# Patient Record
Sex: MALE | Race: BLACK OR AFRICAN AMERICAN | NOT HISPANIC OR LATINO | Employment: FULL TIME | URBAN - METROPOLITAN AREA
[De-identification: names, ages, dates, MRNs, and addresses within clinical notes are randomized per-mention and may not be internally consistent; named-entity substitution may affect disease eponyms.]

---

## 2017-05-16 ENCOUNTER — HOSPITAL ENCOUNTER (OUTPATIENT)
Dept: BONE DENSITY | Facility: CLINIC | Age: 22
Discharge: HOME | End: 2017-05-16
Attending: INTERNAL MEDICINE | Admitting: INTERNAL MEDICINE
Payer: MEDICAID

## 2017-05-16 DIAGNOSIS — K50.00 CROHN'S DISEASE OF SMALL INTESTINE WITHOUT COMPLICATION (H): ICD-10-CM

## 2017-05-16 DIAGNOSIS — E55.9 VITAMIN D DEFICIENCY: ICD-10-CM

## 2017-05-16 PROCEDURE — 77080 DXA BONE DENSITY AXIAL: CPT

## 2017-07-23 ENCOUNTER — HOSPITAL ENCOUNTER (EMERGENCY)
Facility: CLINIC | Age: 22
Discharge: HOME | End: 2017-07-23
Attending: NURSE PRACTITIONER | Admitting: NURSE PRACTITIONER
Payer: COMMERCIAL

## 2017-07-23 ENCOUNTER — APPOINTMENT (OUTPATIENT)
Dept: CT IMAGING | Facility: CLINIC | Age: 22
End: 2017-07-23
Attending: NURSE PRACTITIONER
Payer: COMMERCIAL

## 2017-07-23 ENCOUNTER — APPOINTMENT (OUTPATIENT)
Dept: GENERAL RADIOLOGY | Facility: CLINIC | Age: 22
End: 2017-07-23
Attending: NURSE PRACTITIONER
Payer: COMMERCIAL

## 2017-07-23 VITALS
OXYGEN SATURATION: 99 % | SYSTOLIC BLOOD PRESSURE: 113 MMHG | HEART RATE: 62 BPM | DIASTOLIC BLOOD PRESSURE: 68 MMHG | TEMPERATURE: 98.1 F | WEIGHT: 145 LBS | HEIGHT: 68 IN | BODY MASS INDEX: 21.98 KG/M2

## 2017-07-23 DIAGNOSIS — R51.9 HEADACHE, UNSPECIFIED HEADACHE TYPE: ICD-10-CM

## 2017-07-23 DIAGNOSIS — R06.02 SOB (SHORTNESS OF BREATH): ICD-10-CM

## 2017-07-23 LAB
ANION GAP SERPL CALCULATED.3IONS-SCNC: 9 MMOL/L (ref 3–14)
BASOPHILS # BLD AUTO: 0 10E9/L (ref 0–0.2)
BASOPHILS NFR BLD AUTO: 0.2 %
BUN SERPL-MCNC: 16 MG/DL (ref 7–30)
CALCIUM SERPL-MCNC: 8.6 MG/DL (ref 8.5–10.1)
CHLORIDE SERPL-SCNC: 106 MMOL/L (ref 94–109)
CO2 SERPL-SCNC: 25 MMOL/L (ref 20–32)
CREAT SERPL-MCNC: 1.24 MG/DL (ref 0.66–1.25)
D DIMER PPP FEU-MCNC: NORMAL UG/ML FEU (ref 0–0.5)
DIFFERENTIAL METHOD BLD: ABNORMAL
EOSINOPHIL # BLD AUTO: 0.2 10E9/L (ref 0–0.7)
EOSINOPHIL NFR BLD AUTO: 2.1 %
ERYTHROCYTE [DISTWIDTH] IN BLOOD BY AUTOMATED COUNT: 15 % (ref 10–15)
GFR SERPL CREATININE-BSD FRML MDRD: 73 ML/MIN/1.7M2
GLUCOSE SERPL-MCNC: 89 MG/DL (ref 70–99)
HCT VFR BLD AUTO: 37.8 % (ref 40–53)
HGB BLD-MCNC: 12.1 G/DL (ref 13.3–17.7)
IMM GRANULOCYTES # BLD: 0 10E9/L (ref 0–0.4)
IMM GRANULOCYTES NFR BLD: 0 %
LYMPHOCYTES # BLD AUTO: 3.3 10E9/L (ref 0.8–5.3)
LYMPHOCYTES NFR BLD AUTO: 36.6 %
MCH RBC QN AUTO: 24.6 PG (ref 26.5–33)
MCHC RBC AUTO-ENTMCNC: 32 G/DL (ref 31.5–36.5)
MCV RBC AUTO: 77 FL (ref 78–100)
MONOCYTES # BLD AUTO: 0.6 10E9/L (ref 0–1.3)
MONOCYTES NFR BLD AUTO: 6.5 %
NEUTROPHILS # BLD AUTO: 5 10E9/L (ref 1.6–8.3)
NEUTROPHILS NFR BLD AUTO: 54.6 %
NRBC # BLD AUTO: 0 10*3/UL
NRBC BLD AUTO-RTO: 0 /100
PLATELET # BLD AUTO: 295 10E9/L (ref 150–450)
POTASSIUM SERPL-SCNC: 3.7 MMOL/L (ref 3.4–5.3)
RBC # BLD AUTO: 4.91 10E12/L (ref 4.4–5.9)
SODIUM SERPL-SCNC: 140 MMOL/L (ref 133–144)
WBC # BLD AUTO: 9.1 10E9/L (ref 4–11)

## 2017-07-23 PROCEDURE — 99285 EMERGENCY DEPT VISIT HI MDM: CPT | Mod: 25

## 2017-07-23 PROCEDURE — 85379 FIBRIN DEGRADATION QUANT: CPT | Performed by: NURSE PRACTITIONER

## 2017-07-23 PROCEDURE — 93005 ELECTROCARDIOGRAM TRACING: CPT

## 2017-07-23 PROCEDURE — 71020 XR CHEST 2 VW: CPT

## 2017-07-23 PROCEDURE — 70450 CT HEAD/BRAIN W/O DYE: CPT

## 2017-07-23 PROCEDURE — 85025 COMPLETE CBC W/AUTO DIFF WBC: CPT | Performed by: NURSE PRACTITIONER

## 2017-07-23 PROCEDURE — 80048 BASIC METABOLIC PNL TOTAL CA: CPT | Performed by: NURSE PRACTITIONER

## 2017-07-23 ASSESSMENT — ENCOUNTER SYMPTOMS
WHEEZING: 0
APPETITE CHANGE: 1
FEVER: 0
WEAKNESS: 0
ABDOMINAL PAIN: 0
LIGHT-HEADEDNESS: 1
SHORTNESS OF BREATH: 1
HEADACHES: 1
PALPITATIONS: 0
NUMBNESS: 0

## 2017-07-23 NOTE — ED AVS SNAPSHOT
Emergency Department    [address] [facility] [address]    Phone:  [phone]    Fax:  [phone]                                       Giovanni Carranza   MRN: [id number]    Department:   Emergency Department   Date of Visit:  7/23/2017           Patient Information     Date Of Birth          1995        Your diagnoses for this visit were:     Headache, unspecified headache type resolved    SOB (shortness of breath)        You were seen by Neyda Jefferson CNP.      Follow-up Information     Follow up with Andrea Alarcon MD In 2 days.    Specialty:  Gastroenterology    Contact information:    [facility]  [address] [facility] [address] [facility] [id number] [phone]          Follow up with  Emergency Department.    Specialty:  EMERGENCY MEDICINE    Why:  As needed, If symptoms worsen    Contact information:    [address] [facility] [id number] [phone]        Discharge Instructions       Discharge Instructions  Headache    You were seen today for a headache. Headaches may be caused by many different things such as muscle tension, sinus inflammation, anxiety and stress, having too little sleep, too much alcohol, some medical conditions or injury. You may have a migraine, which is caused by changes in the blood vessels in your head.  At this time your doctor does not find that your headache is a sign of anything dangerous or life-threatening.  However, sometimes the signs of serious illness do not show up right away.  If you have new or worse symptoms, you may need to be seen again in the emergency department or by your primary doctor.      Return to the Emergency Department if:    You get a fever of 101 F or higher.    Your headache gets much worse.    You get a stiff neck with your headache.    You get a new headache that is different or worse than headaches you have had before.    You are vomiting and can t keep food or water down.    You have  blurry or double vision or other problems with your eyes.    You have a new weakness on one side of your body.    You have difficulty with balance which is new.    You or your family thinks you are confused.    You have a seizure or convulsion.    What can I do to help myself?    Pain medications - You may take a pain medication such as Tylenol  (acetaminophen), Advil , Nuprin  (ibuprofen) or Aleve  (naproxen).  If you have been given a narcotic such as Vicodin  (hydrocodone with acetaminophen), Percocet  (oxycodone with acetaminophen), codeine, or a muscle relaxant such as Flexeril  (cyclobenzaprine) or Soma  (carisoprodol), do not drive for four hours after you have taken it. If the narcotic contains Tylenol  (acetaminophen), do not take Tylenol  with it. All narcotics will cause constipation, so eat a high fiber diet.        Take a pain reliever as soon as you notice symptoms.  Starting medications as soon as you start to have symptoms may lessen the amount of pain you have.    Relaxing in a quiet, dark room may help.    Get enough sleep and eat meals regularly.    Schedule an appointment with your primary physician as instructed, or at least within 1 week.    You may need to watch for certain foods or other things which may trigger your headaches.  Keeping a journal of your headaches and possible triggers may help you and your primary doctor to identify things which you should avoid which may be causing your headaches.  If you were given a prescription for medicine here today, be sure to read all of the information (including the package insert) that comes with your prescription.  This will include important information about the medicine, its side effects, and any warnings that you need to know about.  The pharmacist who fills the prescription can provide more information and answer questions you may have about the medicine.  If you have questions or concerns that the pharmacist cannot address, please call or  return to the Emergency Department.     Remember that you can always come back to the Emergency Department if you are not able to see your regular doctor in the amount of time listed above, if you get any new symptoms, or if there is anything that worries you.      Shortness of Breath (Dyspnea)  Shortness of breath is the feeling that you can't catch your breath or get enough air. It is also known as dyspnea.  Dyspnea can be caused by many different conditions. They include:    Acute asthma attack.    Worsening of chronic lung diseases such as chronic bronchitis and emphysema.    Heart failure. This is when weak heart muscle allows extra fluid to collect in the lungs.    Panic attacks or anxiety. Fear can cause rapid breathing (hyperventilation).    Pneumonia, or an infection in the lung tissue.    Exposure to toxic substances, fumes, smoke, or certain medicines.    Blood clot in the lung (pulmonary embolism). This is often from a piece of blood clot in a deep vein of the leg (deep vein thrombosis) that breaks off and travels to the lungs.    Heart attack or heart-related chest pain (angina).    Anemia.    Collapsed lung (pneumothorax).    Dehydration.    Pregnancy.  Based on your visit today, the exact cause of your shortness of breath is not certain. Your tests don t show any of the serious causes of dyspnea. You may need other tests to find out if you have a serious problem. It s important to watch for any new symptoms or symptoms that get worse. Follow up with your healthcare provider as directed.  Home care  Follow these tips to take care of yourself at home:    When your symptoms are better, go back to your usual activities.    If you smoke, you should stop. Join a quit-smoking program or ask your healthcare provider for help.    Eat a healthy diet and get plenty of sleep.    Get regular exercise. Talk with your healthcare provider before starting to exercise, especially if you have other medical  problems.    Cut down on the amount of caffeine and stimulants you consume.  Follow-up care  Follow up with your healthcare provider, or as advised.  If tests were done, you will be told if your treatment needs to be changed. You can call as directed for the results.  (Note: If an X-ray was taken, a specialist will review it. You will be notified of any new findings that may affect your care.)  Call 911 or get immediate medical care  Shortness of breath may be a sign of a serious medical problem. For example, it may be a problem with your heart or lungs. Call 911 if you have worsening shortness of breath or trouble breathing, especially with any of the symptoms below:    You are confused or it s difficult to wake you.    You faint or lose consciousness.    You have a fast heartbeat, or your heartbeat is irregular.    You are coughing up blood.    You have pain in your chest, arm, shoulder, neck, or upper back.    You break out in a sweat.  When to seek medical advice  Call your healthcare provider right away if any of these occur:    Slight shortness of breath or wheezing    Redness, pain or swelling in your leg, arm, or other body area    Swelling in both legs or ankles    Fast weight gain    Dizziness or weakness    Fever of 100.4 F (38 C) or higher, or as directed by your healthcare provider  Date Last Reviewed: 9/13/2015 2000-2017 [employer]. [address] [place], [place] [address]. All rights reserved. This information is not intended as a substitute for professional medical care. Always follow your healthcare professional's instructions.            24 Hour Appointment Hotline       To make an appointment at any [facility], call [phone] ([phone]). If you don't have a family doctor or clinic, we will help you find one. [place] clinics are conveniently located to serve the needs of you and your family.             Review of your medicines      Our records show that you are  taking the medicines listed below. If these are incorrect, please call your family doctor or clinic.        Dose / Directions Last dose taken    REMICADE IV        Every 2 months. (Patient gets infusions, current dose unknown)   Refills:  0                Procedures and tests performed during your visit     Basic metabolic panel    CBC with platelets differential    Chest XR,  PA & LAT    D dimer quantitative    EKG 12 lead    Head CT w/o contrast      Orders Needing Specimen Collection     None      Pending Results     Date and Time Order Name Status Description    7/23/2017 2111 Chest XR,  PA & LAT Preliminary             Pending Culture Results     No orders found from 7/21/2017 to 7/24/2017.            Pending Results Instructions     If you had any lab results that were not finalized at the time of your Discharge, you can call the ED Lab Result RN at [phone]. You will be contacted by this team for any positive Lab results or changes in treatment. The nurses are available 7 days a week from 10A to 6:30P.  You can leave a message 24 hours per day and they will return your call.        Test Results From Your Hospital Stay        7/23/2017  9:50 PM      Component Results     Component Value Ref Range & Units Status    Sodium 140 133 - 144 mmol/L Final    Potassium 3.7 3.4 - 5.3 mmol/L Final    Chloride 106 94 - 109 mmol/L Final    Carbon Dioxide 25 20 - 32 mmol/L Final    Anion Gap 9 3 - 14 mmol/L Final    Glucose 89 70 - 99 mg/dL Final    Urea Nitrogen 16 7 - 30 mg/dL Final    Creatinine 1.24 0.66 - 1.25 mg/dL Final    GFR Estimate 73 >60 mL/min/1.7m2 Final    Non  GFR Calc    GFR Estimate If Black 89 >60 mL/min/1.7m2 Final    African American GFR Calc    Calcium 8.6 8.5 - 10.1 mg/dL Final         7/23/2017  9:37 PM      Component Results     Component Value Ref Range & Units Status    WBC 9.1 4.0 - 11.0 10e9/L Final    RBC Count 4.91 4.4 - 5.9 10e12/L Final    Hemoglobin 12.1 (L) 13.3 - 17.7  g/dL Final    Hematocrit 37.8 (L) 40.0 - 53.0 % Final    MCV 77 (L) 78 - 100 fl Final    MCH 24.6 (L) 26.5 - 33.0 pg Final    MCHC 32.0 31.5 - 36.5 g/dL Final    RDW 15.0 10.0 - 15.0 % Final    Platelet Count 295 150 - 450 10e9/L Final    Diff Method Automated Method  Final    % Neutrophils 54.6 % Final    % Lymphocytes 36.6 % Final    % Monocytes 6.5 % Final    % Eosinophils 2.1 % Final    % Basophils 0.2 % Final    % Immature Granulocytes 0.0 % Final    Nucleated RBCs 0 0 /100 Final    Absolute Neutrophil 5.0 1.6 - 8.3 10e9/L Final    Absolute Lymphocytes 3.3 0.8 - 5.3 10e9/L Final    Absolute Monocytes 0.6 0.0 - 1.3 10e9/L Final    Absolute Eosinophils 0.2 0.0 - 0.7 10e9/L Final    Absolute Basophils 0.0 0.0 - 0.2 10e9/L Final    Abs Immature Granulocytes 0.0 0 - 0.4 10e9/L Final    Absolute Nucleated RBC 0.0  Final         7/23/2017  9:49 PM      Component Results     Component Value Ref Range & Units Status    D Dimer  0.0 - 0.50 ug/ml FEU Final    <0.3  This D-dimer assay is intended for use in conjunction with a clinical pretest   probability assessment model to exclude pulmonary embolism (PE) and deep venous   thrombosis (DVT) in outpatients suspected of PE or DVT. The cut-off value is   0.5 ug/mL FEU.           7/23/2017 10:03 PM      Narrative     CHEST TWO VIEWS 7/23/2017 9:50 PM     HISTORY: Shortness of breath.    COMPARISON: 11/2/2016.    FINDINGS: There are no acute infiltrates. The cardiac silhouette is  not enlarged. Pulmonary vasculature is unremarkable.        Impression     IMPRESSION: No acute disease.          7/23/2017 10:03 PM      Narrative     CT SCAN OF THE HEAD WITHOUT CONTRAST   7/23/2017 9:49 PM     HISTORY: Headache x 1 month    TECHNIQUE:  Axial images of the head and coronal reformations without  IV contrast material.  Radiation dose for this scan was reduced using  automated exposure control, adjustment of the mA and/or kV according  to patient size, or iterative reconstruction  technique.    COMPARISON: None.    FINDINGS:  The ventricles are normal in size, shape and configuration.   The brain parenchyma and subarachnoid spaces are normal. There is no  evidence of intracranial hemorrhage, mass, acute infarct or anomaly.     The visualized portions of the sinuses and mastoids appear normal.  There is no evidence of trauma.        Impression     IMPRESSION: Normal CT scan of the head.      HEMANTH ALEXANDER MD                Clinical Quality Measure: Blood Pressure Screening     Your blood pressure was checked while you were in the emergency department today. The last reading we obtained was  BP: 113/68 . Please read the guidelines below about what these numbers mean and what you should do about them.  If your systolic blood pressure (the top number) is less than 120 and your diastolic blood pressure (the bottom number) is less than 80, then your blood pressure is normal. There is nothing more that you need to do about it.  If your systolic blood pressure (the top number) is 120-139 or your diastolic blood pressure (the bottom number) is 80-89, your blood pressure may be higher than it should be. You should have your blood pressure rechecked within a year by a primary care provider.  If your systolic blood pressure (the top number) is 140 or greater or your diastolic blood pressure (the bottom number) is 90 or greater, you may have high blood pressure. High blood pressure is treatable, but if left untreated over time it can put you at risk for heart attack, stroke, or kidney failure. You should have your blood pressure rechecked by a primary care provider within the next 4 weeks.  If your provider in the emergency department today gave you specific instructions to follow-up with your doctor or provider even sooner than that, you should follow that instruction and not wait for up to 4 weeks for your follow-up visit.        Thank you for choosing [facility]       Thank you for choosing [facility] for  "your care. Our goal is always to provide you with excellent care. Hearing back from our patients is one way we can continue to improve our services. Please take a few minutes to complete the written survey that you may receive in the mail after you visit with us. Thank you!        [employer] Information     [employer] lets you send messages to your doctor, view your test results, renew your prescriptions, schedule appointments and more. To sign up, go to [email or web address] . Click on \"Log in\" on the left side of the screen, which will take you to the Welcome page. Then click on \"Sign up Now\" on the right side of the page.     You will be asked to enter the access code listed below, as well as some personal information. Please follow the directions to create your username and password.     Your access code is: [email or web address]  Expires: 10/21/2017 10:38 PM     Your access code will  in 90 days. If you need help or a new code, please call your [place] clinic or [phone].        Care EveryWhere ID     This is your Care EveryWhere ID. This could be used by other organizations to access your [place] medical records  [id number]        Equal Access to Services     MIMI DANIEL AH: Anjali Massey, michelle morris, griffin romero, eloy albright. So [facility] [phone].    ATENCIÓN: Si habla español, tiene a rodriguez disposición servicios gratuitos de asistencia lingüística. Mikhail al [phone].    We comply with applicable federal civil rights laws and [place] laws. We do not discriminate on the basis of race, color, national origin, age, disability sex, sexual orientation or gender identity.            After Visit Summary       This is your record. Keep this with you and show to your community pharmacist(s) and doctor(s) at your next visit.                  "

## 2017-07-23 NOTE — ED AVS SNAPSHOT
Emergency Department    [address] [place] [address]    Phone:  [phone]    Fax:  [phone]                                       Giovanni Carranza   MRN: [id number]    Department:   Emergency Department   Date of Visit:  7/23/2017           After Visit Summary Signature Page     I have received my discharge instructions, and my questions have been answered. I have discussed any challenges I see with this plan with the nurse or doctor.    ..........................................................................................................................................  Patient/Patient Representative Signature      ..........................................................................................................................................  Patient Representative Print Name and Relationship to Patient    ..................................................               ................................................  Date                                            Time    ..........................................................................................................................................  Reviewed by Signature/Title    ...................................................              ..............................................  Date                                                            Time

## 2017-07-24 LAB — INTERPRETATION ECG - MUSE: NORMAL

## 2017-07-24 NOTE — DISCHARGE INSTRUCTIONS
Discharge Instructions  Headache    You were seen today for a headache. Headaches may be caused by many different things such as muscle tension, sinus inflammation, anxiety and stress, having too little sleep, too much alcohol, some medical conditions or injury. You may have a migraine, which is caused by changes in the blood vessels in your head.  At this time your doctor does not find that your headache is a sign of anything dangerous or life-threatening.  However, sometimes the signs of serious illness do not show up right away.  If you have new or worse symptoms, you may need to be seen again in the emergency department or by your primary doctor.      Return to the Emergency Department if:    You get a fever of 101 F or higher.    Your headache gets much worse.    You get a stiff neck with your headache.    You get a new headache that is different or worse than headaches you have had before.    You are vomiting and can t keep food or water down.    You have blurry or double vision or other problems with your eyes.    You have a new weakness on one side of your body.    You have difficulty with balance which is new.    You or your family thinks you are confused.    You have a seizure or convulsion.    What can I do to help myself?    Pain medications - You may take a pain medication such as Tylenol  (acetaminophen), Advil , Nuprin  (ibuprofen) or Aleve  (naproxen).  If you have been given a narcotic such as Vicodin  (hydrocodone with acetaminophen), Percocet  (oxycodone with acetaminophen), codeine, or a muscle relaxant such as Flexeril  (cyclobenzaprine) or Soma  (carisoprodol), do not drive for four hours after you have taken it. If the narcotic contains Tylenol  (acetaminophen), do not take Tylenol  with it. All narcotics will cause constipation, so eat a high fiber diet.        Take a pain reliever as soon as you notice symptoms.  Starting medications as soon as you start to have symptoms may lessen the  amount of pain you have.    Relaxing in a quiet, dark room may help.    Get enough sleep and eat meals regularly.    Schedule an appointment with your primary physician as instructed, or at least within 1 week.    You may need to watch for certain foods or other things which may trigger your headaches.  Keeping a journal of your headaches and possible triggers may help you and your primary doctor to identify things which you should avoid which may be causing your headaches.  If you were given a prescription for medicine here today, be sure to read all of the information (including the package insert) that comes with your prescription.  This will include important information about the medicine, its side effects, and any warnings that you need to know about.  The pharmacist who fills the prescription can provide more information and answer questions you may have about the medicine.  If you have questions or concerns that the pharmacist cannot address, please call or return to the Emergency Department.     Remember that you can always come back to the Emergency Department if you are not able to see your regular doctor in the amount of time listed above, if you get any new symptoms, or if there is anything that worries you.      Shortness of Breath (Dyspnea)  Shortness of breath is the feeling that you can't catch your breath or get enough air. It is also known as dyspnea.  Dyspnea can be caused by many different conditions. They include:    Acute asthma attack.    Worsening of chronic lung diseases such as chronic bronchitis and emphysema.    Heart failure. This is when weak heart muscle allows extra fluid to collect in the lungs.    Panic attacks or anxiety. Fear can cause rapid breathing (hyperventilation).    Pneumonia, or an infection in the lung tissue.    Exposure to toxic substances, fumes, smoke, or certain medicines.    Blood clot in the lung (pulmonary embolism). This is often from a piece of blood clot in a  deep vein of the leg (deep vein thrombosis) that breaks off and travels to the lungs.    Heart attack or heart-related chest pain (angina).    Anemia.    Collapsed lung (pneumothorax).    Dehydration.    Pregnancy.  Based on your visit today, the exact cause of your shortness of breath is not certain. Your tests don t show any of the serious causes of dyspnea. You may need other tests to find out if you have a serious problem. It s important to watch for any new symptoms or symptoms that get worse. Follow up with your healthcare provider as directed.  Home care  Follow these tips to take care of yourself at home:    When your symptoms are better, go back to your usual activities.    If you smoke, you should stop. Join a quit-smoking program or ask your healthcare provider for help.    Eat a healthy diet and get plenty of sleep.    Get regular exercise. Talk with your healthcare provider before starting to exercise, especially if you have other medical problems.    Cut down on the amount of caffeine and stimulants you consume.  Follow-up care  Follow up with your healthcare provider, or as advised.  If tests were done, you will be told if your treatment needs to be changed. You can call as directed for the results.  (Note: If an X-ray was taken, a specialist will review it. You will be notified of any new findings that may affect your care.)  Call 911 or get immediate medical care  Shortness of breath may be a sign of a serious medical problem. For example, it may be a problem with your heart or lungs. Call 911 if you have worsening shortness of breath or trouble breathing, especially with any of the symptoms below:    You are confused or it s difficult to wake you.    You faint or lose consciousness.    You have a fast heartbeat, or your heartbeat is irregular.    You are coughing up blood.    You have pain in your chest, arm, shoulder, neck, or upper back.    You break out in a sweat.  When to seek medical  advice  Call your healthcare provider right away if any of these occur:    Slight shortness of breath or wheezing    Redness, pain or swelling in your leg, arm, or other body area    Swelling in both legs or ankles    Fast weight gain    Dizziness or weakness    Fever of 100.4 F (38 C) or higher, or as directed by your healthcare provider  Date Last Reviewed: 9/13/2015 2000-2017 [employer]. [address] [place] [address]. All rights reserved. This information is not intended as a substitute for professional medical care. Always follow your healthcare professional's instructions.

## 2017-07-24 NOTE — ED PROVIDER NOTES
History     Chief Complaint:  Headache    HPI   Giovanni Carranza is a 22 year old male, with Crohns disease on Remicade, who presents with ongoing headache. The patient states that he is an active individual as he dances at school and is physically active outside of class. For the past month, the patient noticed a decrease in stamina as well as consistent headaches, localized to the temporal region, that last approximately 3-4 minutes in episodic length and occur multiple times through the day. He notes that no specific stimuli trigger his headaches. Today, the patient states he had a family gathering and was especially active today and was also walking at the mall when he had increased shortness of breath, prompting his ED visit. He denies previous history of headaches, recent injuries or trauma to his head or neck, visual changes, history of asthma, wheezing, fever, chest pain, palpitations, abdominal pain, confusion, or weakness associated with the pain. While here in the ED, the patient states he does not currently have a headache.     Allergies:  Percocet [Oxycodone-Acetaminophen]      Medications:    Remicade    Past Medical History:    Crohn's disease  Psoas abscess  Vision problem    Past Surgical History:    Colonoscopy  Laparoscopic resection ileocecal  Small intestine surgery    Family History:    No past pertinent family history.     Social History:  Negative for alcohol use.  Negative for tobacco use.   Marital Status:  Single [1]     Review of Systems   Constitutional: Positive for appetite change. Negative for fever.   Respiratory: Positive for shortness of breath. Negative for wheezing.    Cardiovascular: Negative for chest pain and palpitations.   Gastrointestinal: Negative for abdominal pain.   Neurological: Positive for light-headedness and headaches. Negative for weakness and numbness.   All other systems reviewed and are negative.    Physical Exam   Vitals:   BP Temp Temp src Heart Rate SpO2  "Height Weight   07/23/17 2050 113/68 98.1  F (36.7  C) Oral 62 99 % 1.727 m (5' 8\") 65.8 kg (145 lb)        Physical Exam  Nursing notes reviewed. Vitals reviewed.  General: Alert. Well kept.  Eyes:  Conjunctiva non-injected, non-icteric.  Ears:TM s normal.  Neck/Throat: Moist mucous membranes, oropharynx clear without erythema or exudate. No cervical lymphadenopathy.  Normal voice.  Cardiac: Regular rhythm. Normal heart sounds with no murmur/rubs/click. 2+ radial pulses bilateral.  Pulmonary: Clear and equal breath sounds bilaterally. No crackles/rales. No wheezing  Abdomen: Soft. Non-distended. Non-tender to palpation. No masses. No guarding or rebound.  Musculoskeletal: Normal gross range of motion of all 4 extremities.    Neurological: Alert and oriented x4. CN II-XII intact.  5/5 strength bilateral upper and lower extremities with intact sensation. Visual fields without deficit.  Skin: Warm and dry without rashes or petechiae. Normal appearance of visualized exposed skin.  Psych: Affect normal. Good eye contact.     Emergency Department Course   ECG:  Indication: Headache  Time: 2124  Vent. Rate 66 bpm. MA interval 154. QRS duration 90. QT/QTc 384/402. P-R-T axis 66 40 30. Normal sinus rhythm. Normal ECG. Read time: 2125. Read by Dr. Ramirez.      Imaging:  Radiographic findings were communicated with the patient who voiced understanding of the findings.  XR Chest PA and LAT:   No acute disease. As per radiology.      CT Head without contrast:   Normal CT scan of the head. As per radiology.    Laboratory:  CBC: WBC: 9.1, HGB: 12.1(L), PLT: 295  BMP: all WNL (Creatinine: 1.24)    D-Dimer:<0.3    Emergency Department Course:  Nursing notes and vitals reviewed. I performed an exam of the patient as documented above.     IV inserted. Medicine administered as documented above. Blood drawn. This was sent to the lab for further testing, results above.    The patient was sent for a head CT and chest XR while in the " emergency department, findings above.     2228 I rechecked the patient and discussed the results of their workup thus far.      Findings and plan explained to the Patient. Patient discharged home with instructions regarding supportive care, medications, and reasons to return. The importance of close follow-up was reviewed.     I personally reviewed the laboratory results with the Patient and answered all related questions prior to discharge.      Impression & Plan      Medical Decision Making:  Giovanni Carranza is a 22 year old male who presents for evaluation of multiple complaints, specifically with concern for headache. Differential diagnosis includes encephalitis, meningitis, stroke, tumor, migraine, cluster headache. He also is concerned for generalized lethargy and shortness of breath which occurs intermittently over the last month and is noting he just does not have the normal stamina he has had in the past. He has a history of Crohn's disease and receives Remicade with next dose this coming Wednesday. He denies signs of infectionsuch as fevers, recent ill contacts, and lab work today is reassuring with stable vital signs. He notes he does not have a history of headache but has had intermittent headaches lasting 2-3 minutes over the last 1.5 months. His headaches have not required intervention. Secondary to these new headaches, CT was obtained of the head today and was negative for any acute findings. He has no meningeal signs. EKG also was obtained and was negative for evidence for HOCM, prolonged QT syndrome, WPW, or Brugada syndrome. D-Dimer was negative and with normal oxygenation, no hypoxia, and no chest pain, pulmonary embolism is unlikely. Patient did notice increased working out over the last couple months and has not been necessarily eating and drinking well, this may be contributing to his increased tiredness and intermittent headaches. There is no indication for further cardiac or neurologic work  up today.  He will be discharged home in stable condition and is appropriate for outpatient follow up in 2-3 days.     Diagnosis:    ICD-10-CM    1. Headache, unspecified headache type R51     resolved   2. SOB (shortness of breath) R06.02      Disposition:  discharged to home    Christine URRUTIA, am serving as a scribe on 7/23/2017 at 9:10 PM to personally document services performed by Neyda Jefferson CNP based on my observations and the provider's statements to me.      Christine Patrick  7/23/2017    EMERGENCY DEPARTMENT       Neyda Jefferson CNP  07/24/17 0038

## 2018-01-12 ENCOUNTER — HOSPITAL ENCOUNTER (EMERGENCY)
Facility: CLINIC | Age: 23
Discharge: HOME | End: 2018-01-12
Attending: EMERGENCY MEDICINE | Admitting: EMERGENCY MEDICINE
Payer: COMMERCIAL

## 2018-01-12 ENCOUNTER — APPOINTMENT (OUTPATIENT)
Dept: CT IMAGING | Facility: CLINIC | Age: 23
End: 2018-01-12
Attending: EMERGENCY MEDICINE
Payer: COMMERCIAL

## 2018-01-12 VITALS
DIASTOLIC BLOOD PRESSURE: 60 MMHG | RESPIRATION RATE: 16 BRPM | HEIGHT: 67 IN | WEIGHT: 138 LBS | BODY MASS INDEX: 21.66 KG/M2 | TEMPERATURE: 97.7 F | SYSTOLIC BLOOD PRESSURE: 117 MMHG | OXYGEN SATURATION: 99 %

## 2018-01-12 DIAGNOSIS — R10.32 LLQ ABDOMINAL PAIN: ICD-10-CM

## 2018-01-12 LAB
ALBUMIN SERPL-MCNC: 4 G/DL (ref 3.4–5)
ALP SERPL-CCNC: 58 U/L (ref 40–150)
ALT SERPL W P-5'-P-CCNC: 21 U/L (ref 0–70)
ANION GAP SERPL CALCULATED.3IONS-SCNC: 5 MMOL/L (ref 3–14)
AST SERPL W P-5'-P-CCNC: 17 U/L (ref 0–45)
BASOPHILS # BLD AUTO: 0 10E9/L (ref 0–0.2)
BASOPHILS NFR BLD AUTO: 0.2 %
BILIRUB SERPL-MCNC: 0.4 MG/DL (ref 0.2–1.3)
BUN SERPL-MCNC: 13 MG/DL (ref 7–30)
CALCIUM SERPL-MCNC: 8.7 MG/DL (ref 8.5–10.1)
CHLORIDE SERPL-SCNC: 106 MMOL/L (ref 94–109)
CO2 SERPL-SCNC: 27 MMOL/L (ref 20–32)
CREAT SERPL-MCNC: 1.06 MG/DL (ref 0.66–1.25)
DIFFERENTIAL METHOD BLD: ABNORMAL
EOSINOPHIL # BLD AUTO: 0 10E9/L (ref 0–0.7)
EOSINOPHIL NFR BLD AUTO: 0.9 %
ERYTHROCYTE [DISTWIDTH] IN BLOOD BY AUTOMATED COUNT: 16.6 % (ref 10–15)
GFR SERPL CREATININE-BSD FRML MDRD: 88 ML/MIN/1.7M2
GLUCOSE SERPL-MCNC: 82 MG/DL (ref 70–99)
HCT VFR BLD AUTO: 38.6 % (ref 40–53)
HGB BLD-MCNC: 11.4 G/DL (ref 13.3–17.7)
IMM GRANULOCYTES # BLD: 0 10E9/L (ref 0–0.4)
IMM GRANULOCYTES NFR BLD: 0 %
LYMPHOCYTES # BLD AUTO: 1.4 10E9/L (ref 0.8–5.3)
LYMPHOCYTES NFR BLD AUTO: 31.7 %
MCH RBC QN AUTO: 21.8 PG (ref 26.5–33)
MCHC RBC AUTO-ENTMCNC: 29.5 G/DL (ref 31.5–36.5)
MCV RBC AUTO: 74 FL (ref 78–100)
MONOCYTES # BLD AUTO: 0.4 10E9/L (ref 0–1.3)
MONOCYTES NFR BLD AUTO: 8.5 %
NEUTROPHILS # BLD AUTO: 2.5 10E9/L (ref 1.6–8.3)
NEUTROPHILS NFR BLD AUTO: 58.7 %
NRBC # BLD AUTO: 0 10*3/UL
NRBC BLD AUTO-RTO: 0 /100
PLATELET # BLD AUTO: 276 10E9/L (ref 150–450)
POTASSIUM SERPL-SCNC: 4.2 MMOL/L (ref 3.4–5.3)
PROT SERPL-MCNC: 8.1 G/DL (ref 6.8–8.8)
RBC # BLD AUTO: 5.24 10E12/L (ref 4.4–5.9)
SODIUM SERPL-SCNC: 138 MMOL/L (ref 133–144)
WBC # BLD AUTO: 4.3 10E9/L (ref 4–11)

## 2018-01-12 PROCEDURE — 99285 EMERGENCY DEPT VISIT HI MDM: CPT | Mod: 25

## 2018-01-12 PROCEDURE — 25000128 H RX IP 250 OP 636: Performed by: EMERGENCY MEDICINE

## 2018-01-12 PROCEDURE — 74177 CT ABD & PELVIS W/CONTRAST: CPT

## 2018-01-12 PROCEDURE — 96375 TX/PRO/DX INJ NEW DRUG ADDON: CPT

## 2018-01-12 PROCEDURE — 96376 TX/PRO/DX INJ SAME DRUG ADON: CPT

## 2018-01-12 PROCEDURE — 25000125 ZZHC RX 250: Performed by: EMERGENCY MEDICINE

## 2018-01-12 PROCEDURE — 96361 HYDRATE IV INFUSION ADD-ON: CPT

## 2018-01-12 PROCEDURE — 80053 COMPREHEN METABOLIC PANEL: CPT | Performed by: EMERGENCY MEDICINE

## 2018-01-12 PROCEDURE — 96374 THER/PROPH/DIAG INJ IV PUSH: CPT

## 2018-01-12 PROCEDURE — 85025 COMPLETE CBC W/AUTO DIFF WBC: CPT | Performed by: EMERGENCY MEDICINE

## 2018-01-12 RX ORDER — SODIUM CHLORIDE 9 MG/ML
1000 INJECTION, SOLUTION INTRAVENOUS CONTINUOUS
Status: DISCONTINUED | OUTPATIENT
Start: 2018-01-12 | End: 2018-01-12 | Stop reason: HOSPADM

## 2018-01-12 RX ORDER — MORPHINE SULFATE 4 MG/ML
4 INJECTION, SOLUTION INTRAMUSCULAR; INTRAVENOUS
Status: COMPLETED | OUTPATIENT
Start: 2018-01-12 | End: 2018-01-12

## 2018-01-12 RX ORDER — ONDANSETRON 2 MG/ML
4 INJECTION INTRAMUSCULAR; INTRAVENOUS
Status: COMPLETED | OUTPATIENT
Start: 2018-01-12 | End: 2018-01-12

## 2018-01-12 RX ORDER — MORPHINE SULFATE 4 MG/ML
4 INJECTION, SOLUTION INTRAMUSCULAR; INTRAVENOUS ONCE
Status: COMPLETED | OUTPATIENT
Start: 2018-01-12 | End: 2018-01-12

## 2018-01-12 RX ORDER — IOPAMIDOL 755 MG/ML
70 INJECTION, SOLUTION INTRAVASCULAR ONCE
Status: COMPLETED | OUTPATIENT
Start: 2018-01-12 | End: 2018-01-12

## 2018-01-12 RX ORDER — HYDROCODONE BITARTRATE AND ACETAMINOPHEN 5; 325 MG/1; MG/1
1-2 TABLET ORAL EVERY 4 HOURS PRN
Qty: 12 TABLET | Refills: 0 | Status: SHIPPED | OUTPATIENT
Start: 2018-01-12 | End: 2018-01-27

## 2018-01-12 RX ADMIN — SODIUM CHLORIDE 61 ML: 9 INJECTION, SOLUTION INTRAVENOUS at 11:19

## 2018-01-12 RX ADMIN — IOPAMIDOL 70 ML: 755 INJECTION, SOLUTION INTRAVENOUS at 11:19

## 2018-01-12 RX ADMIN — MORPHINE SULFATE 4 MG: 4 INJECTION INTRAVENOUS at 12:28

## 2018-01-12 RX ADMIN — MORPHINE SULFATE 4 MG: 4 INJECTION INTRAVENOUS at 10:24

## 2018-01-12 RX ADMIN — ONDANSETRON 4 MG: 2 INJECTION INTRAMUSCULAR; INTRAVENOUS at 10:22

## 2018-01-12 RX ADMIN — SODIUM CHLORIDE 1000 ML: 9 INJECTION, SOLUTION INTRAVENOUS at 10:22

## 2018-01-12 ASSESSMENT — ENCOUNTER SYMPTOMS
COUGH: 0
VOMITING: 0
DYSURIA: 0
ABDOMINAL PAIN: 1
NAUSEA: 1
BLOOD IN STOOL: 0
SORE THROAT: 0
FEVER: 0

## 2018-01-12 NOTE — ED AVS SNAPSHOT
Emergency Department    [address] [place] [id number]    Phone:  [phone]    Fax:  [phone]                                       Giovanni Carranza   MRN: [id number]    Department:   Emergency Department   Date of Visit:  1/12/2018           Patient Information     Date Of Birth          1995        Your diagnoses for this visit were:     LLQ abdominal pain        You were seen by Danielle Suazo MD.      Follow-up Information     Please follow up.    Why:  Follow up with your gastroenterologist. Return if worsening pain, fever, vomiting        Discharge Instructions         Abdominal Pain    Abdominal pain is pain in the stomach or belly area. Everyone has this pain from time to time. In many cases it goes away on its own. But abdominal pain can sometimes be due to a serious problem, such as appendicitis. So it s important to know when to seek help.  Causes of abdominal pain  There are many possible causes of abdominal pain. Common causes in adults include:    Constipation, diarrhea, or gas    Stomach acid flowing back up into the esophagus (acid reflux or heartburn)    Severe acid reflux, called GERD (gastroesophageal reflux disease)    A sore in the lining of the stomach or small intestine (peptic ulcer)    Inflammation of the gallbladder, liver, or pancreas    Gallstones or kidney stones    Appendicitis     Intestinal blockage     An internal organ pushing through a muscle or other tissue (hernia)    Urinary tract infections    In women, menstrual cramps, fibroids, or endometriosis    Inflammation or infection of the intestines  Diagnosing the cause of abdominal pain  Your healthcare provider will do a physical exam help find the cause of your pain. If needed, tests will be ordered. Belly pain has many possible causes. So it can be hard to find the reason for your pain. Giving details about your pain can help. Tell your provider where and when you feel the pain, and  what makes it better or worse. Also let your provider know if you have other symptoms such as:    Fever    Tiredness    Upset stomach (nausea)    Vomiting    Changes in bathroom habits  Treating abdominal pain  Some causes of pain need emergency medical treatment right away. These include appendicitis or a bowel blockage. Other problems can be treated with rest, fluids, or medicines. Your healthcare provider can give you specific instructions for treatment or self-care based on what is causing your pain.  If you have vomiting or diarrhea, sip water or other clear fluids. When you are ready to eat solid foods again, start with small amounts of easy-to-digest, low-fat foods. These include apple sauce, toast, or crackers.   When to seek medical care  Call 911 or go to the hospital right away if you:    Can t pass stool and are vomiting    Are vomiting blood or have bloody diarrhea or black, tarry diarrhea    Have chest, neck, or shoulder pain    Feel like you might pass out    Have pain in your shoulder blades with nausea    Have sudden, severe belly pain    Have new, severe pain unlike any you have felt before    Have a belly that is rigid, hard, and tender to touch  Call your healthcare provider if you have:    Pain for more than 5 days    Bloating for more than 2 days    Diarrhea for more than 5 days    A fever of 100.4 F (38.0 C) or higher, or as directed by your provider    Pain that gets worse    Weight loss for no reason    Continued lack of appetite    Blood in your stool  How to prevent abdominal pain  Here are some tips to help prevent abdominal pain:    Eat smaller amounts of food at one time.    Avoid greasy, fried, or other high-fat foods.    Avoid foods that give you gas.    Exercise regularly.    Drink plenty of fluids.  To help prevent GERD symptoms:    Quit smoking.    Reduce alcohol and certain foods that increase stomach acid.    Avoid aspirin and over-the-counter pain and fever medicines (NSAIDS or  nonsteroidal anti-inflammatory drugs), if possible    Lose extra weight.    Finish eating at least 2 hours before you go to bed or lie down.    Raise the head of your bed.  Date Last Reviewed: 7/1/2016 2000-2017 [employer]. [address] [place] [address], [place] [address]. All rights reserved. This information is not intended as a substitute for professional medical care. Always follow your healthcare professional's instructions.          24 Hour Appointment Hotline       To make an appointment at any [facility], call [phone] ([phone]). If you don't have a family doctor or clinic, we will help you find one. [facility] are conveniently located to serve the needs of you and your family.             Review of your medicines      START taking        Dose / Directions Last dose taken    HYDROcodone-acetaminophen 5-325 MG per tablet   Commonly known as:  NORCO   Dose:  1-2 tablet   Quantity:  12 tablet        Take 1-2 tablets by mouth every 4 hours as needed for moderate to severe pain   Refills:  0          Our records show that you are taking the medicines listed below. If these are incorrect, please call your family doctor or clinic.        Dose / Directions Last dose taken    REMICADE IV        Every 2 months. (Patient gets infusions, current dose unknown)   Refills:  0                Prescriptions were sent or printed at these locations (1 Prescription)                   Other Prescriptions                Printed at Department/Unit printer (1 of 1)         HYDROcodone-acetaminophen (NORCO) 5-325 MG per tablet                Procedures and tests performed during your visit     CBC with platelets differential    CT Abdomen Pelvis w Contrast    Comprehensive metabolic panel    Give 20 ounces of water 15 minutes before CT of abdomen      Orders Needing Specimen Collection     None      Pending Results     No orders found from 1/10/2018 to 1/13/2018.            Pending Culture Results      No orders found from 1/10/2018 to 1/13/2018.            Pending Results Instructions     If you had any lab results that were not finalized at the time of your Discharge, you can call the ED Lab Result RN at [phone]. You will be contacted by this team for any positive Lab results or changes in treatment. The nurses are available 7 days a week from 10A to 6:30P.  You can leave a message 24 hours per day and they will return your call.        Test Results From Your Hospital Stay        1/12/2018 10:19 AM      Component Results     Component Value Ref Range & Units Status    WBC 4.3 4.0 - 11.0 10e9/L Final    RBC Count 5.24 4.4 - 5.9 10e12/L Final    Hemoglobin 11.4 (L) 13.3 - 17.7 g/dL Final    Hematocrit 38.6 (L) 40.0 - 53.0 % Final    MCV 74 (L) 78 - 100 fl Final    MCH 21.8 (L) 26.5 - 33.0 pg Final    MCHC 29.5 (L) 31.5 - 36.5 g/dL Final    RDW 16.6 (H) 10.0 - 15.0 % Final    Platelet Count 276 150 - 450 10e9/L Final    Diff Method Automated Method  Final    % Neutrophils 58.7 % Final    % Lymphocytes 31.7 % Final    % Monocytes 8.5 % Final    % Eosinophils 0.9 % Final    % Basophils 0.2 % Final    % Immature Granulocytes 0.0 % Final    Nucleated RBCs 0 0 /100 Final    Absolute Neutrophil 2.5 1.6 - 8.3 10e9/L Final    Absolute Lymphocytes 1.4 0.8 - 5.3 10e9/L Final    Absolute Monocytes 0.4 0.0 - 1.3 10e9/L Final    Absolute Eosinophils 0.0 0.0 - 0.7 10e9/L Final    Absolute Basophils 0.0 0.0 - 0.2 10e9/L Final    Abs Immature Granulocytes 0.0 0 - 0.4 10e9/L Final    Absolute Nucleated RBC 0.0  Final         1/12/2018 10:35 AM      Component Results     Component Value Ref Range & Units Status    Sodium 138 133 - 144 mmol/L Final    Potassium 4.2 3.4 - 5.3 mmol/L Final    Chloride 106 94 - 109 mmol/L Final    Carbon Dioxide 27 20 - 32 mmol/L Final    Anion Gap 5 3 - 14 mmol/L Final    Glucose 82 70 - 99 mg/dL Final    Urea Nitrogen 13 7 - 30 mg/dL Final    Creatinine 1.06 0.66 - 1.25 mg/dL Final    GFR  Estimate 88 >60 mL/min/1.7m2 Final    Non  GFR Calc    GFR Estimate If Black >90 >60 mL/min/1.7m2 Final    African American GFR Calc    Calcium 8.7 8.5 - 10.1 mg/dL Final    Bilirubin Total 0.4 0.2 - 1.3 mg/dL Final    Albumin 4.0 3.4 - 5.0 g/dL Final    Protein Total 8.1 6.8 - 8.8 g/dL Final    Alkaline Phosphatase 58 40 - 150 U/L Final    ALT 21 0 - 70 U/L Final    AST 17 0 - 45 U/L Final         1/12/2018  1:07 PM      Narrative     CT ABDOMEN AND PELVIS WITH CONTRAST  1/12/2018 11:30 AM     HISTORY:  Abdomen pain, Crohn's.     TECHNIQUE: 70 mL Isovue-370. Radiation dose for this scan was reduced  using automated exposure control, adjustment of the mA and/or kV  according to patient size, or iterative reconstruction technique.    COMPARISON: CT scan from 3/28/2016.    FINDINGS: Lung bases are unremarkable. The liver, gallbladder, spleen,  and pancreas are normal. No adrenal lesions. Kidneys are normal with a  mildly prominent left extrarenal pelvis. No retroperitoneal  adenopathy.    Scans through the pelvis demonstrate a normal-appearing bladder. There  is a surgical staple line, likely from a re-anastomosis in the right  colon near the cecum, that has a similar appearance to the prior  study. There is no dilated bowel or evidence of bowel obstruction. No  free air. There may be a trace amount of free fluid in the pelvis.        Impression     IMPRESSION:  1. Postoperative changes from recent anastomosis in the right colon  appears unchanged.  2. No evidence of obstruction, bowel wall thickening, or free air.  Trace amount of free fluid in the pelvis of uncertain etiology or  significance.    LINDA NAVARRO MD                Clinical Quality Measure: Blood Pressure Screening     Your blood pressure was checked while you were in the emergency department today. The last reading we obtained was  BP: 135/52 . Please read the guidelines below about what these numbers mean and what you should do about  "them.  If your systolic blood pressure (the top number) is less than 120 and your diastolic blood pressure (the bottom number) is less than 80, then your blood pressure is normal. There is nothing more that you need to do about it.  If your systolic blood pressure (the top number) is 120-139 or your diastolic blood pressure (the bottom number) is 80-89, your blood pressure may be higher than it should be. You should have your blood pressure rechecked within a year by a primary care provider.  If your systolic blood pressure (the top number) is 140 or greater or your diastolic blood pressure (the bottom number) is 90 or greater, you may have high blood pressure. High blood pressure is treatable, but if left untreated over time it can put you at risk for heart attack, stroke, or kidney failure. You should have your blood pressure rechecked by a primary care provider within the next 4 weeks.  If your provider in the emergency department today gave you specific instructions to follow-up with your doctor or provider even sooner than that, you should follow that instruction and not wait for up to 4 weeks for your follow-up visit.        Thank you for choosing [facility]       Thank you for choosing [facility] for your care. Our goal is always to provide you with excellent care. Hearing back from our patients is one way we can continue to improve our services. Please take a few minutes to complete the written survey that you may receive in the mail after you visit with us. Thank you!        [employer] Information     [employer] lets you send messages to your doctor, view your test results, renew your prescriptions, schedule appointments and more. To sign up, go to [email or web address] . Click on \"Log in\" on the left side of the screen, which will take you to the Welcome page. Then click on \"Sign up Now\" on the right side of the page.     You will be asked to enter the access code listed below, as well as some personal " information. Please follow the directions to create your username and password.     Your access code is: [email or web address]  Expires: 2018  1:56 PM     Your access code will  in 90 days. If you need help or a new code, please call your [facility] clinic or [phone].        Care EveryWhere ID     This is your Care EveryWhere ID. This could be used by other organizations to access your [facility] medical records  [id number]        Equal Access to Services     [facility] : Anjali urbano Sonatalee, waaxda luqadaha, qaybta kaalmada adenohemy, eloy blakely . So [facility] [phone].    ATENCIÓN: Si habla español, tiene a rodriguez disposición servicios gratuitos de asistencia lingüística. Llame al [phone].    We comply with applicable federal civil rights laws and [place] laws. We do not discriminate on the basis of race, color, national origin, age, disability, sex, sexual orientation, or gender identity.            After Visit Summary       This is your record. Keep this with you and show to your community pharmacist(s) and doctor(s) at your next visit.

## 2018-01-12 NOTE — DISCHARGE INSTRUCTIONS

## 2018-01-12 NOTE — ED PROVIDER NOTES
"  History     Chief Complaint:  Abdominal pain    HPI   Giovanni Carranza is a 22 year old male with a history of Crohn's disease s/p laparoscopic resection ileocecal (2015) who presents to the emergency department for evaluation of abdominal pain. He sees Dr. Alarcon for his Crohn's disease. The patient reports that his abdominal pain began this morning. He also endorses nausea.  He describes his pain as \"sharp\" and \"severe.\" He states that this feels like a Crohn's flare up. The patient's last bowel movement was this morning, but he is unsure if it was formed or not. He denies vomiting, cough, fever, sore throat, dysuria, and blood in his stool.    Allergies:  Percocet [Oxycodone-Acetaminophen]      Medications:    Remicade     Past Medical History:    Crohn's disease  Psoas abscess, right  Vision problem   Abdominal pain     Past Surgical History:    Colonoscopy  Combined Cystoscopy, insert Catheter Ureter  Laparoscopic resection Ileocecal  Laparotomy Exploratory  Small intestine surgery    Family History:    History reviewed. No pertinent family history.      Social History:  The patient was alone.  Smoking Status: Never  Smokeless Tobacco: Never  Alcohol Use: No   Marital Status:  Single      Review of Systems   Constitutional: Negative for fever.   HENT: Negative for sore throat.    Respiratory: Negative for cough.    Gastrointestinal: Positive for abdominal pain and nausea. Negative for blood in stool and vomiting.   Genitourinary: Negative for dysuria.     Physical Exam   Patient Vitals for the past 24 hrs:   BP Temp Temp src Heart Rate Resp SpO2 Height Weight   01/12/18 0929 135/52 97.7  F (36.5  C) Oral 62 16 100 % 1.702 m (5' 7\") 62.6 kg (138 lb)        Physical Exam    Physical Exam   Constitutional:  Patient is oriented to person, place, and time. They appear well-developed and well-nourished. Mild distress secondary to his abdominal pain.   HENT:   Mouth/Throat:   Oropharynx is clear and moist.   Eyes: "    Conjunctivae normal and EOM are normal. Pupils are equal, round, and reactive to light.   Neck:    Normal range of motion.   Cardiovascular: Normal rate, regular rhythm and normal heart sounds.  Exam reveals no gallop and no friction rub.  No murmur heard.  Pulmonary/Chest:  Effort normal and breath sounds normal. Patient has no wheezes. Patient has no rales.   Abdominal:   Soft. Bowel sounds are normal. Patient has pain along the left side, he is relatively thin, no masses appreciated. Guarding but no rebound.   Musculoskeletal:  Normal range of motion. Patient exhibits no edema.   Neurological:   Patient is alert and oriented to person, place, and time. Patient has normal strength. No cranial nerve deficit or sensory deficit. GCS 15  Skin:   Skin is warm and dry. No rash noted. No erythema.   Psychiatric:   Patient has a normal mood and affect. Patient's behavior is normal. Judgment and thought content normal.     Emergency Department Course     Imaging:  Radiology findings were communicated with the patient who voiced understanding of the findings.    CT Abdomen Pelvis w Contrast:  IMPRESSION:  1. Postoperative changes from recent anastomosis in the right colon  appears unchanged.  2. No evidence of obstruction, bowel wall thickening, or free air.  Trace amount of free fluid in the pelvis of uncertain etiology or  Significance.  Report per radiology     Laboratory:  Laboratory findings were communicated with the patient who voiced understanding of the findings.    CBC: HGB 11.4 (L) o/w WNL. (WBC 4.3, )   CMP: AWNL (Creatinine 1.06)    Interventions:  1022 - NS Bolus 1,000mL IV   1022 - Zofran 4mg IV   1024 - Morphine 4mg IV  1228 - Morphine 4mg IV    Emergency Department Course:  Nursing notes and vitals reviewed.  IV was inserted and blood was drawn for laboratory testing, results above.  The patient was sent for a CT Abdomen Pelvis w Contrast while in the emergency department, results above.     1006:  I performed an exam of the patient as documented above.   1220: Patient rechecked and updated.   1237: Patient rechecked and updated.   1340: Patient rechecked and updated.     Findings and plan explained to the Patient. Patient discharged home with instructions regarding supportive care, medications, and reasons to return. The importance of close follow-up was reviewed. The patient was prescribed Norco.  I personally reviewed the laboratory and imaging results with the Patient and answered all related questions prior to discharge.    Impression & Plan      Medical Decision Making:  Giovanni Carranza is a 22 year old male, very pleasant, with a history of Crohn's disease and a partial bowel resection coming in with abdominal pain. He did not have a surgical abdomen but did have very reproducable left sided pain. Due to his previous history of abdominal surgeries, a CT scan was performed to further evaluate for inflammation, infection, obstruction, hematoma, abscess, mass. CT scan does not reflect any source of his pain. Furthermore his white blood cell count is normal. He is chronically anemic. This is stable for him. No evidence of metabolic derangement and liver function is normal. At this time it is not clear what is causing his pain. With everything being reassuringly normal on his evaluation, he is able to tolerate PO, he appears very comfortable and states his pain is controlled. I feel he is safe for discharge. He will follow up with gastroenterology. He will return if he develops fever or if hs pain worsens, new onset of vomiting, or if he is unable to keep fluids down.     Diagnosis:    ICD-10-CM    1. LLQ abdominal pain R10.32        Disposition:  Discharged to home.    Discharge Medications:  New Prescriptions    HYDROCODONE-ACETAMINOPHEN (NORCO) 5-325 MG PER TABLET    Take 1-2 tablets by mouth every 4 hours as needed for moderate to severe pain       Scribe Disclosure:  I, Rupali Macdonald, am serving as a  scribe at 10:06 AM on 1/12/2018 to document services personally performed by Danielle Suazo MD based on my observations and the provider's statements to me.   1/12/2018    EMERGENCY DEPARTMENT       Danielle Suazo MD  01/13/18 0705

## 2018-01-12 NOTE — ED AVS SNAPSHOT
Emergency Department    [address] [place] [id number]    Phone:  [phone]    Fax:  [phone]                                       Giovanni Carranza   MRN: [id number]    Department:   Emergency Department   Date of Visit:  1/12/2018           After Visit Summary Signature Page     I have received my discharge instructions, and my questions have been answered. I have discussed any challenges I see with this plan with the nurse or doctor.    ..........................................................................................................................................  Patient/Patient Representative Signature      ..........................................................................................................................................  Patient Representative Print Name and Relationship to Patient    ..................................................               ................................................  Date                                            Time    ..........................................................................................................................................  Reviewed by Signature/Title    ...................................................              ..............................................  Date                                                            Time

## 2018-01-27 ENCOUNTER — APPOINTMENT (OUTPATIENT)
Dept: GENERAL RADIOLOGY | Facility: CLINIC | Age: 23
End: 2018-01-27
Attending: EMERGENCY MEDICINE
Payer: COMMERCIAL

## 2018-01-27 ENCOUNTER — HOSPITAL ENCOUNTER (EMERGENCY)
Facility: CLINIC | Age: 23
Discharge: HOME | End: 2018-01-27
Attending: EMERGENCY MEDICINE | Admitting: EMERGENCY MEDICINE
Payer: COMMERCIAL

## 2018-01-27 VITALS
SYSTOLIC BLOOD PRESSURE: 121 MMHG | HEART RATE: 75 BPM | TEMPERATURE: 98.3 F | WEIGHT: 142.42 LBS | DIASTOLIC BLOOD PRESSURE: 76 MMHG | HEIGHT: 68 IN | OXYGEN SATURATION: 98 % | RESPIRATION RATE: 12 BRPM | BODY MASS INDEX: 21.58 KG/M2

## 2018-01-27 DIAGNOSIS — R10.84 ABDOMINAL PAIN, GENERALIZED: ICD-10-CM

## 2018-01-27 LAB
ALBUMIN SERPL-MCNC: 3.8 G/DL (ref 3.4–5)
ALBUMIN UR-MCNC: NEGATIVE MG/DL
ALP SERPL-CCNC: 62 U/L (ref 40–150)
ALT SERPL W P-5'-P-CCNC: 19 U/L (ref 0–70)
AMORPH CRY #/AREA URNS HPF: ABNORMAL /HPF
ANION GAP SERPL CALCULATED.3IONS-SCNC: 6 MMOL/L (ref 3–14)
APPEARANCE UR: ABNORMAL
AST SERPL W P-5'-P-CCNC: 18 U/L (ref 0–45)
BACTERIA #/AREA URNS HPF: ABNORMAL /HPF
BASOPHILS # BLD AUTO: 0.1 10E9/L (ref 0–0.2)
BASOPHILS NFR BLD AUTO: 0.7 %
BILIRUB SERPL-MCNC: 0.2 MG/DL (ref 0.2–1.3)
BILIRUB UR QL STRIP: NEGATIVE
BUN SERPL-MCNC: 16 MG/DL (ref 7–30)
CALCIUM SERPL-MCNC: 8.7 MG/DL (ref 8.5–10.1)
CHLORIDE SERPL-SCNC: 104 MMOL/L (ref 94–109)
CO2 SERPL-SCNC: 29 MMOL/L (ref 20–32)
COLOR UR AUTO: YELLOW
CREAT SERPL-MCNC: 0.91 MG/DL (ref 0.66–1.25)
DIFFERENTIAL METHOD BLD: ABNORMAL
EOSINOPHIL # BLD AUTO: 0.2 10E9/L (ref 0–0.7)
EOSINOPHIL NFR BLD AUTO: 2.7 %
ERYTHROCYTE [DISTWIDTH] IN BLOOD BY AUTOMATED COUNT: 17.6 % (ref 10–15)
GFR SERPL CREATININE-BSD FRML MDRD: >90 ML/MIN/1.7M2
GLUCOSE SERPL-MCNC: 84 MG/DL (ref 70–99)
GLUCOSE UR STRIP-MCNC: NEGATIVE MG/DL
HCT VFR BLD AUTO: 37.9 % (ref 40–53)
HGB BLD-MCNC: 10.8 G/DL (ref 13.3–17.7)
HGB UR QL STRIP: NEGATIVE
IMM GRANULOCYTES # BLD: 0 10E9/L (ref 0–0.4)
IMM GRANULOCYTES NFR BLD: 0.3 %
KETONES UR STRIP-MCNC: NEGATIVE MG/DL
LEUKOCYTE ESTERASE UR QL STRIP: ABNORMAL
LIPASE SERPL-CCNC: 207 U/L (ref 73–393)
LYMPHOCYTES # BLD AUTO: 3 10E9/L (ref 0.8–5.3)
LYMPHOCYTES NFR BLD AUTO: 43.1 %
MCH RBC QN AUTO: 21.7 PG (ref 26.5–33)
MCHC RBC AUTO-ENTMCNC: 28.5 G/DL (ref 31.5–36.5)
MCV RBC AUTO: 76 FL (ref 78–100)
MONOCYTES # BLD AUTO: 0.8 10E9/L (ref 0–1.3)
MONOCYTES NFR BLD AUTO: 10.8 %
NEUTROPHILS # BLD AUTO: 3 10E9/L (ref 1.6–8.3)
NEUTROPHILS NFR BLD AUTO: 42.4 %
NITRATE UR QL: NEGATIVE
NRBC # BLD AUTO: 0 10*3/UL
NRBC BLD AUTO-RTO: 0 /100
PH UR STRIP: 7 PH (ref 5–7)
PLATELET # BLD AUTO: 316 10E9/L (ref 150–450)
POTASSIUM SERPL-SCNC: 4 MMOL/L (ref 3.4–5.3)
PROT SERPL-MCNC: 7.7 G/DL (ref 6.8–8.8)
RBC # BLD AUTO: 4.97 10E12/L (ref 4.4–5.9)
RBC #/AREA URNS AUTO: 3 /HPF (ref 0–2)
SODIUM SERPL-SCNC: 139 MMOL/L (ref 133–144)
SOURCE: ABNORMAL
SP GR UR STRIP: 1.02 (ref 1–1.03)
UROBILINOGEN UR STRIP-MCNC: 0 MG/DL (ref 0–2)
WBC # BLD AUTO: 7.1 10E9/L (ref 4–11)
WBC #/AREA URNS AUTO: 9 /HPF (ref 0–2)

## 2018-01-27 PROCEDURE — 85025 COMPLETE CBC W/AUTO DIFF WBC: CPT | Performed by: EMERGENCY MEDICINE

## 2018-01-27 PROCEDURE — 87186 SC STD MICRODIL/AGAR DIL: CPT | Performed by: EMERGENCY MEDICINE

## 2018-01-27 PROCEDURE — 80053 COMPREHEN METABOLIC PANEL: CPT | Performed by: EMERGENCY MEDICINE

## 2018-01-27 PROCEDURE — 81001 URINALYSIS AUTO W/SCOPE: CPT | Performed by: EMERGENCY MEDICINE

## 2018-01-27 PROCEDURE — 83690 ASSAY OF LIPASE: CPT | Performed by: EMERGENCY MEDICINE

## 2018-01-27 PROCEDURE — 99284 EMERGENCY DEPT VISIT MOD MDM: CPT | Mod: 25

## 2018-01-27 PROCEDURE — 25000128 H RX IP 250 OP 636: Performed by: EMERGENCY MEDICINE

## 2018-01-27 PROCEDURE — 87088 URINE BACTERIA CULTURE: CPT | Performed by: EMERGENCY MEDICINE

## 2018-01-27 PROCEDURE — 96375 TX/PRO/DX INJ NEW DRUG ADDON: CPT

## 2018-01-27 PROCEDURE — 96374 THER/PROPH/DIAG INJ IV PUSH: CPT

## 2018-01-27 PROCEDURE — 87086 URINE CULTURE/COLONY COUNT: CPT | Performed by: EMERGENCY MEDICINE

## 2018-01-27 PROCEDURE — 74019 RADEX ABDOMEN 2 VIEWS: CPT

## 2018-01-27 PROCEDURE — 25000132 ZZH RX MED GY IP 250 OP 250 PS 637: Performed by: EMERGENCY MEDICINE

## 2018-01-27 RX ORDER — ONDANSETRON 2 MG/ML
4 INJECTION INTRAMUSCULAR; INTRAVENOUS ONCE
Status: COMPLETED | OUTPATIENT
Start: 2018-01-27 | End: 2018-01-27

## 2018-01-27 RX ORDER — HYDROCODONE BITARTRATE AND ACETAMINOPHEN 5; 325 MG/1; MG/1
2 TABLET ORAL ONCE
Status: COMPLETED | OUTPATIENT
Start: 2018-01-27 | End: 2018-01-27

## 2018-01-27 RX ORDER — ONDANSETRON 2 MG/ML
4 INJECTION INTRAMUSCULAR; INTRAVENOUS ONCE
Status: DISCONTINUED | OUTPATIENT
Start: 2018-01-27 | End: 2018-01-28 | Stop reason: HOSPADM

## 2018-01-27 RX ORDER — POLYETHYLENE GLYCOL 3350 17 G/17G
1 POWDER, FOR SOLUTION ORAL DAILY
Qty: 527 G | Refills: 0 | Status: SHIPPED | OUTPATIENT
Start: 2018-01-27 | End: 2018-02-26

## 2018-01-27 RX ORDER — KETOROLAC TROMETHAMINE 15 MG/ML
15 INJECTION, SOLUTION INTRAMUSCULAR; INTRAVENOUS ONCE
Status: COMPLETED | OUTPATIENT
Start: 2018-01-27 | End: 2018-01-27

## 2018-01-27 RX ADMIN — ONDANSETRON 4 MG: 2 INJECTION INTRAMUSCULAR; INTRAVENOUS at 20:05

## 2018-01-27 RX ADMIN — KETOROLAC TROMETHAMINE 15 MG: 15 INJECTION, SOLUTION INTRAMUSCULAR; INTRAVENOUS at 20:05

## 2018-01-27 RX ADMIN — HYDROCODONE BITARTRATE AND ACETAMINOPHEN 2 TABLET: 5; 325 TABLET ORAL at 20:39

## 2018-01-27 ASSESSMENT — ENCOUNTER SYMPTOMS
CONSTIPATION: 1
BLOOD IN STOOL: 0
VOMITING: 0
DIARRHEA: 0
FEVER: 0
NAUSEA: 1
ABDOMINAL PAIN: 1

## 2018-01-27 NOTE — ED AVS SNAPSHOT
[facility] Emergency Department    [address]    [facility] [address]    Phone:  [phone]    Fax:  [phone]                                       Giovanni Carranza   MRN: [id number]    Department:  [facility] Emergency Department   Date of Visit:  1/27/2018           Patient Information     Date Of Birth          1995        Your diagnoses for this visit were:     Abdominal pain, generalized        You were seen by Mahesh Levin MD.        Discharge Instructions       Please make an appointment to follow up with your primary care provider in 2-3 days if not improving.    Discharge Instructions  Abdominal Pain    Abdominal pain (belly pain) can be caused by many things. Your evaluation today does not show the exact cause for your pain. Your provider today has decided that it is unlikely your pain is due to a life threatening problem, or a problem requiring surgery or hospital admission. Sometimes those problems cannot be found right away, so it is very important that you follow up as directed.  Sometimes only the changes which occur over time allow the cause of your pain to be found.    Generally, every Emergency Department visit should have a follow-up clinic visit with either a primary or a specialty clinic/provider. Please follow-up as instructed by your emergency provider today. With abdominal pain, we often recommend very close follow-up, such as the following day.    ADULTS:  Return to the Emergency Department right away if:      You get an oral temperature above 102oF or as directed by your provider.    You have blood in your stools. This may be bright red or appear as black, tarry stools.      You keep vomiting (throwing up) or cannot drink liquids.    You see blood when you vomit.     You cannot have a bowel movement or you cannot pass gas.    Your stomach gets bloated or bigger.    Your skin or the whites of your eyes look yellow.    You  faint.    You have bloody, frequent or painful urination (peeing).    You have new symptoms or anything that worries you.    CHILDREN:  Return to the Emergency Department right away if your child has any of the above-listed symptoms or the following:      Pushes your hand away or screams/cries when his/her belly is touched.    You notice your child is very fussy or weak.    Your child is very tired and is too tired to eat or drink.    Your child is dehydrated.  Signs of dehydration can be:  o Significant change in the amount of wet diapers/urine.  o Your infant or child starts to have dry mouth and lips, or no saliva (spit) or tears.    PREGNANT WOMEN:  Return to the Emergency Department right away if you have any of the above-listed symptoms or the following:      You have bleeding, leaking fluid or passing tissue from the vagina.    You have worse pain or cramping, or pain in your shoulder or back.    You have vomiting that will not stop.    You have a temperature of 100oF or more.    Your baby is not moving as much as usual.    You faint.    You get a bad headache with or without eye problems and abdominal pain.    You have a seizure.    You have unusual discharge from your vagina and abdominal pain.    Abdominal pain is pretty common during pregnancy.  Your pain may or may not be related to your pregnancy. You should follow-up closely with your OB provider so they can evaluate you and your baby.  Until you follow-up with your regular provider, do the following:       Avoid sex and do not put anything in your vagina.    Drink clear fluids.    Only take medications approved by your provider.    MORE INFORMATION:    Appendicitis:  A possible cause of abdominal pain in any person who still has their appendix is acute appendicitis. Appendicitis is often hard to diagnose.  Testing does not always rule out early appendicitis or other causes of abdominal pain. Close follow-up with your provider and re-evaluations may be  "needed to figure out the reason for your abdominal pain.    Follow-up:  It is very important that you make an appointment with your clinic and go to the appointment.  If you do not follow-up with your primary provider, it may result in missing an important development which could result in permanent injury or disability and/or lasting pain.  If there is any problem keeping your appointment, call your provider or return to the Emergency Department.    Medications:  Take your medications as directed by your provider today.  Before using over-the-counter medications, ask your provider and make sure to take the medications as directed.  If you have any questions about medications, ask your provider.    Diet:  Resume your normal diet as much as possible, but do not eat fried, fatty or spicy foods while you have pain.  Do not drink alcohol or have caffeine.  Do not smoke tobacco.    Probiotics: If you have been given an antibiotic, you may want to also take a probiotic pill or eat yogurt with live cultures. Probiotics have \"good bacteria\" to help your intestines stay healthy. Studies have shown that probiotics help prevent diarrhea (loose stools) and other intestine problems (including C. diff infection) when you take antibiotics. You can buy these without a prescription in the pharmacy section of the store.     If you were given a prescription for medicine here today, be sure to read all of the information (including the package insert) that comes with your prescription.  This will include important information about the medicine, its side effects, and any warnings that you need to know about.  The pharmacist who fills the prescription can provide more information and answer questions you may have about the medicine.  If you have questions or concerns that the pharmacist cannot address, please call or return to the Emergency Department.       Remember that you can always come back to the Emergency Department if you are " not able to see your regular provider in the amount of time listed above, if you get any new symptoms, or if there is anything that worries you.      Discharge Instructions  Constipation  Constipation can cause severe cramping pain and your provider thinks this might be the cause of your abdominal pain (belly pain) today.  People usually recognize that they are constipated because they have difficulty having bowel movements, are not having bowel movements frequently enough, or are not having large enough bowel movements. Sometimes, especially in children or older people, you do not recognize that you are constipated until it becomes severe. The most common causes of constipation are a lack of exercise and not eating enough fruits, vegetables, and whole grains. Constipation can also be a side effect of medications, such as narcotics, or may be caused by a disease of the digestive system.    Generally, every Emergency Department visit should have a follow-up clinic visit with either a primary or a specialty clinic/provider. Please follow-up as instructed by your emergency provider today. Sometimes, chronic constipation requires further testing to determine the cause. If you are over 50 years old, you may need a colonoscopy if you have not had one before.     Return to the Emergency Department if:    Your abdominal pain worsens or does not improve after a bowel movement.    You become very weak.    You get a temperature above 102oF or as directed by your provider.    You have blood in your stools (bright red or black, tarry stools).    You keep vomiting (throwing up) or cannot drink liquids.    Your see blood when you vomit.    Your stomach gets bloated or bigger.    You have new symptoms or anything that worries you.    What can I do to help myself?    If your provider gave you a cathartic medication, like magnesium citrate or GoLytely  (polyethylene glycol), you can expect to have cramps and gas pains after taking  it. You can expect to have a number of bowel movements and even diarrhea (loose or watery stools) in the course of clearing your bowels.  You will know your bowels have been cleaned out after you pass clear liquid. The cramps and gas should let up after you have emptied your bowels. You may want to wait until morning to take this type of medication so you aren t up in the night.     Sometimes instead of cathartics, we recommend laxatives like milk of magnesia to move your bowels more slowly, or an enema to help the bowels to move. Read and follow the package directions, or follow your provider s instructions.    Once you have become very constipated, it takes time for your bowels to return to normal and you need to be very careful to prevent becoming constipated again. Take a laxative if you do not move your bowels at least every two days.       Eat foods that have a lot of fiber. Good choices are fruits, vegetables, prune juice, apple juice, and high fiber cereal. Limit dairy products such as milk and cheese, since these can make constipation worse.     Drink plenty of water.     When you feel the need to go to the bathroom, go to the bathroom. Do not hold it.    Miralax , Metamucil , Colace , Senna or fiber supplements can be used daily.  Miralax  daily is often the best choice for children.  If you were given a prescription for medicine here today, be sure to read all of the information (including the package insert) that comes with your prescription.  This will include important information about the medicine, its side effects, and any warnings that you need to know about.  The pharmacist who fills the prescription can provide more information and answer questions you may have about the medicine.  If you have questions or concerns that the pharmacist cannot address, please call or return to the Emergency Department.   Remember that you can always come back to the Emergency Department if you are not able to see  your regular provider in the amount of time listed above, if you get any new symptoms, or if there is anything that worries you.        24 Hour Appointment Hotline       To make an appointment at any [facility], call [phone] ([phone]). If you don't have a family doctor or clinic, we will help you find one. [facility] clinics are conveniently located to serve the needs of you and your family.             Review of your medicines      START taking        Dose / Directions Last dose taken    polyethylene glycol powder   Commonly known as:  MIRALAX   Dose:  1 capful   Quantity:  527 g        Take 17 g (1 capful) by mouth daily   Refills:  0          Our records show that you are taking the medicines listed below. If these are incorrect, please call your family doctor or clinic.        Dose / Directions Last dose taken    REMICADE IV        Every 2 months. (Patient gets infusions, current dose unknown)   Refills:  0                Prescriptions were sent or printed at these locations (1 Prescription)                   Other Prescriptions                Printed at Department/Unit printer (1 of 1)         polyethylene glycol (MIRALAX) powder                Procedures and tests performed during your visit     Abdomen XR, 2 vw, flat and upright    CBC with platelets differential    Comprehensive metabolic panel    Lipase    UA with Microscopic      Orders Needing Specimen Collection     None      Pending Results     No orders found from 1/25/2018 to 1/28/2018.            Pending Culture Results     No orders found from 1/25/2018 to 1/28/2018.            Pending Results Instructions     If you had any lab results that were not finalized at the time of your Discharge, you can call the ED Lab Result RN at [phone]. You will be contacted by this team for any positive Lab results or changes in treatment. The nurses are available 7 days a week from 10A to 6:30P.  You can leave a message 24 hours per day and  they will return your call.        Test Results From Your Hospital Stay        1/27/2018  8:57 PM      Component Results     Component Value Ref Range & Units Status    WBC 7.1 4.0 - 11.0 10e9/L Final    RBC Count 4.97 4.4 - 5.9 10e12/L Final    Hemoglobin 10.8 (L) 13.3 - 17.7 g/dL Final    Hematocrit 37.9 (L) 40.0 - 53.0 % Final    MCV 76 (L) 78 - 100 fl Final    MCH 21.7 (L) 26.5 - 33.0 pg Final    MCHC 28.5 (L) 31.5 - 36.5 g/dL Final    RDW 17.6 (H) 10.0 - 15.0 % Final    Platelet Count 316 150 - 450 10e9/L Final    Diff Method Automated Method  Final    % Neutrophils 42.4 % Final    % Lymphocytes 43.1 % Final    % Monocytes 10.8 % Final    % Eosinophils 2.7 % Final    % Basophils 0.7 % Final    % Immature Granulocytes 0.3 % Final    Nucleated RBCs 0 0 /100 Final    Absolute Neutrophil 3.0 1.6 - 8.3 10e9/L Final    Absolute Lymphocytes 3.0 0.8 - 5.3 10e9/L Final    Absolute Monocytes 0.8 0.0 - 1.3 10e9/L Final    Absolute Eosinophils 0.2 0.0 - 0.7 10e9/L Final    Absolute Basophils 0.1 0.0 - 0.2 10e9/L Final    Abs Immature Granulocytes 0.0 0 - 0.4 10e9/L Final    Absolute Nucleated RBC 0.0  Final         1/27/2018  9:10 PM      Component Results     Component Value Ref Range & Units Status    Sodium 139 133 - 144 mmol/L Final    Potassium 4.0 3.4 - 5.3 mmol/L Final    Chloride 104 94 - 109 mmol/L Final    Carbon Dioxide 29 20 - 32 mmol/L Final    Anion Gap 6 3 - 14 mmol/L Final    Glucose 84 70 - 99 mg/dL Final    Urea Nitrogen 16 7 - 30 mg/dL Final    Creatinine 0.91 0.66 - 1.25 mg/dL Final    GFR Estimate >90 >60 mL/min/1.7m2 Final    Non  GFR Calc    GFR Estimate If Black >90 >60 mL/min/1.7m2 Final    African American GFR Calc    Calcium 8.7 8.5 - 10.1 mg/dL Final    Bilirubin Total 0.2 0.2 - 1.3 mg/dL Final    Albumin 3.8 3.4 - 5.0 g/dL Final    Protein Total 7.7 6.8 - 8.8 g/dL Final    Alkaline Phosphatase 62 40 - 150 U/L Final    ALT 19 0 - 70 U/L Final    AST 18 0 - 45 U/L Final          1/27/2018  9:10 PM      Component Results     Component Value Ref Range & Units Status    Lipase 207 73 - 393 U/L Final         1/27/2018  9:01 PM      Component Results     Component Value Ref Range & Units Status    Color Urine Yellow  Final    Appearance Urine Cloudy  Final    Glucose Urine Negative NEG^Negative mg/dL Final    Bilirubin Urine Negative NEG^Negative Final    Ketones Urine Negative NEG^Negative mg/dL Final    Specific Gravity Urine 1.025 1.003 - 1.035 Final    Blood Urine Negative NEG^Negative Final    pH Urine 7.0 5.0 - 7.0 pH Final    Protein Albumin Urine Negative NEG^Negative mg/dL Final    Urobilinogen mg/dL 0.0 0.0 - 2.0 mg/dL Final    Nitrite Urine Negative NEG^Negative Final    Leukocyte Esterase Urine Trace (A) NEG^Negative Final    Source Midstream Urine  Final    WBC Urine 9 (H) 0 - 2 /HPF Final    RBC Urine 3 (H) 0 - 2 /HPF Final    Bacteria Urine Few (A) NEG^Negative /HPF Final    Amorphous Crystals Moderate (A) NEG^Negative /HPF Final         1/27/2018  8:31 PM      Narrative     ABDOMEN TWO VIEWS 1/27/2018 8:27 PM     HISTORY: Right mid abd, Crohn's, history of resections.    COMPARISON: None.        Impression     IMPRESSION: Moderate to large amount of stool is seen in the colon and  rectum. No dilated loops of bowel are present. There is no evidence  for free air.    HEMANTH ALEXANDER MD                Clinical Quality Measure: Blood Pressure Screening     Your blood pressure was checked while you were in the emergency department today. The last reading we obtained was  BP: (!) 135/93 . Please read the guidelines below about what these numbers mean and what you should do about them.  If your systolic blood pressure (the top number) is less than 120 and your diastolic blood pressure (the bottom number) is less than 80, then your blood pressure is normal. There is nothing more that you need to do about it.  If your systolic blood pressure (the top number) is 120-139 or your diastolic  "blood pressure (the bottom number) is 80-89, your blood pressure may be higher than it should be. You should have your blood pressure rechecked within a year by a primary care provider.  If your systolic blood pressure (the top number) is 140 or greater or your diastolic blood pressure (the bottom number) is 90 or greater, you may have high blood pressure. High blood pressure is treatable, but if left untreated over time it can put you at risk for heart attack, stroke, or kidney failure. You should have your blood pressure rechecked by a primary care provider within the next 4 weeks.  If your provider in the emergency department today gave you specific instructions to follow-up with your doctor or provider even sooner than that, you should follow that instruction and not wait for up to 4 weeks for your follow-up visit.        Thank you for choosing [facility]       Thank you for choosing [facility] for your care. Our goal is always to provide you with excellent care. Hearing back from our patients is one way we can continue to improve our services. Please take a few minutes to complete the written survey that you may receive in the mail after you visit with us. Thank you!        [employer] Information     [employer] lets you send messages to your doctor, view your test results, renew your prescriptions, schedule appointments and more. To sign up, go to [email or web address] . Click on \"Log in\" on the left side of the screen, which will take you to the Welcome page. Then click on \"Sign up Now\" on the right side of the page.     You will be asked to enter the access code listed below, as well as some personal information. Please follow the directions to create your username and password.     Your access code is: [email or web address]  Expires: 2018  1:56 PM     Your access code will  in 90 days. If you need help or a new code, please call your [facility] clinic or [phone].        Care EveryWhere ID     This is your Care " EveryWhere ID. This could be used by other organizations to access your [place] medical records  [id number]        Equal Access to Services     MIMI DANIEL : Anjali Massey, michelle morris, griffin romero, eloy albright. So [facility] [phone].    ATENCIÓN: Si habla español, tiene a rodriguez disposición servicios gratuitos de asistencia lingüística. Llame al [phone].    We comply with applicable federal civil rights laws and [place] laws. We do not discriminate on the basis of race, color, national origin, age, disability, sex, sexual orientation, or gender identity.            After Visit Summary       This is your record. Keep this with you and show to your community pharmacist(s) and doctor(s) at your next visit.

## 2018-01-27 NOTE — ED AVS SNAPSHOT
[facility] Emergency Department    [address]    [facility] [address]    Phone:  [phone]    Fax:  [phone]                                       Giovanni Carranza   MRN: [id number]    Department:  [facility] Emergency Department   Date of Visit:  1/27/2018           After Visit Summary Signature Page     I have received my discharge instructions, and my questions have been answered. I have discussed any challenges I see with this plan with the nurse or doctor.    ..........................................................................................................................................  Patient/Patient Representative Signature      ..........................................................................................................................................  Patient Representative Print Name and Relationship to Patient    ..................................................               ................................................  Date                                            Time    ..........................................................................................................................................  Reviewed by Signature/Title    ...................................................              ..............................................  Date                                                            Time

## 2018-01-28 NOTE — ED NOTES
"States \"I have Crohns. It is attacking my immune system and making my arms and legs feel weak\". Denies blood in his stool, constipation or diarrhea. Received Rem[employer] infusions, last dose 1 week ago.   "

## 2018-01-28 NOTE — ED PROVIDER NOTES
"  History     Chief Complaint:  Abdominal Pain     HPI   Giovanni Carranza is a 22 year old male, with a history of Chron's Diease and psoas abscess, who presents with abdominal pain. The patient's states that he has had upper right to mid epigastric abdominal pain with associated nausea. He notes that it is similar in nature to his past Crohn's flare ups. He denies any vomiting, diarrhea, blood or mucus present in stool, or urinary symptoms. He additionally denies fever, past history of obstructions or kidney stones. The patient notes that he was last seen 2 weeks about by Dr. Suazo at [facility]. There, he had a CT performed which was normal. The patient notes that he follows with Dr. Disla of [facility]. He has not visited Dr. Disla for an appointment for awhialondra. His last colonoscopy was a year ago.       Allergies:  Percocet [Oxycodone-Acetaminophen]     Medications:    Remicade    Past Medical History:    Crohn's Disease  Psoas abscess    Past Surgical History:    Laparoscopic resection Ileocecal  Small intestine surgery    Family History:    No past pertinent family history.     Social History:  Presents with his father.   Negative for alcohol use.  Negative for tobacco use.   Marital Status:  Single [1]     Review of Systems   Constitutional: Negative for fever.   Gastrointestinal: Positive for abdominal pain, constipation and nausea. Negative for blood in stool, diarrhea and vomiting.   All other systems reviewed and are negative.      Physical Exam   First Vitals:  BP: (!) 135/93  Pulse: 60  Heart Rate: 60  Temp: 98.3  F (36.8  C)  Resp: 18  Height: 172.7 cm (5' 8\")  Weight: 64.6 kg (142 lb 6.7 oz)  SpO2: 99 %    Physical Exam   HENT:   Right Ear: External ear normal.   Left Ear: External ear normal.   Nose: Nose normal.   Eyes: Conjunctivae and lids are normal.   Neck: Neck supple. No tracheal deviation present.   Cardiovascular: Regular rhythm and intact distal pulses.    Pulmonary/Chest: Breath " sounds normal. No respiratory distress.   Abdominal: Soft. He exhibits no distension. There is tenderness (Mild tenderness in the right mid abdomen no palpable abnormality mass or distention). There is no rebound and no guarding.   Musculoskeletal:   No peripheral edema   Neurological:   MAEE, no gross focal motor or sensory deficit   Skin: Skin is warm and dry. He is not diaphoretic.   Psychiatric: He has a normal mood and affect.   Nursing note and vitals reviewed.        Emergency Department Course     Imaging:  Radiographic findings were communicated with the patient who voiced understanding of the findings.  XR Abdominal 2 views:   Moderate to large amount of stool is seen in the colon and  rectum. No dilated loops of bowel are present. There is no evidence  for free air. As per radiology.      Laboratory:  CBC: WBC: 7.1, HGB: 10.8 (L), PLT: 316  CMP: all WNL (Creatinine: 0.91)    UA with micro: trace leukocyte esterase, few bacteria, moderate amorphous crystals, WBC: 9 (H), RBC: 3 (H) o/w negative  Lipase: 207    Interventions:   2005 Toradol, 15 mg, IV  2005 Zofran, 4 mg, IV  2039 Norco, 5-325 mg, 2 tablets, PO    Emergency Department Course:  Nursing notes and vitals reviewed.     1946  I performed an exam of the patient as documented above.     IV inserted. Medicine administered as documented above. Blood drawn. This was sent to the lab for further testing, results above.    The patient was sent for an abdominal XR while in the emergency department, findings above.     2200 I rechecked the patient and discussed the results of his workup thus far.     Findings and plan explained to the Patient. Patient discharged home with instructions regarding supportive care, medications, and reasons to return. The importance of close follow-up was reviewed. The patient was prescribed Miralax.    I personally reviewed the laboratory results with the Patient and answered all related questions prior to discharge.      Impression & Plan      Medical Decision Making:  Giovanni Carranza is a 22 year old male with a history of Crohn's Disease who presents with right sided abdominal pain. No on going diarrhea or frequent blood or mucus in stool. Concerned for obstruction. Less likely ureteral stone, right sided diverticulitis, UTI as he has no dysuria, frequency or urgency. Also less likely testicular torsion or incarcerated inguinal hernia. We will do an abdominal XR, urinalysis, and basic blood test. If unremarkable, I believe we can send him home with conservative management. Exam is benign. I have low suspicion that he has a recurrent, right psoas abscess which has presented with right sided abdominal pain in the past. He had a CT scan done on the 1/12 at [facility] which was normal. Given his overall well appearance, lack of fever, concerning examination, leukocytosis, I feel he can follow up in clinic at this point instead of undergoing a CT scan which he has had many in the past.     XR unremarkable for obstruction. Basic blood tests are unremarkable. Mild pyuria, but no dysuria. We will not empirically treat. We will do a urine culture to see if pathogenic organism grows. I think he can be discharged to home. He is comfortable and in agreement with that plan. Given the findings of mild to large stool, we will give him some Miralax to help him have consistent bowel movements.       Diagnosis:    ICD-10-CM   1. Abdominal pain, generalized R10.84       Disposition:  discharged to home    Discharge Medications:  New Prescriptions    POLYETHYLENE GLYCOL (MIRALAX) POWDER    Take 17 g (1 capful) by mouth daily     I, Christine Patrick, am serving as a scribe on 1/27/2018 at 7:46 PM to personally document services performed by Mahesh Levin MD based on my observations and the provider's statements to me.      Christine Patrick  1/27/2018   [facility] EMERGENCY DEPARTMENT       Mahesh Levin MD  01/28/18  0149

## 2018-01-28 NOTE — DISCHARGE INSTRUCTIONS
Please make an appointment to follow up with your primary care provider in 2-3 days if not improving.    Discharge Instructions  Abdominal Pain    Abdominal pain (belly pain) can be caused by many things. Your evaluation today does not show the exact cause for your pain. Your provider today has decided that it is unlikely your pain is due to a life threatening problem, or a problem requiring surgery or hospital admission. Sometimes those problems cannot be found right away, so it is very important that you follow up as directed.  Sometimes only the changes which occur over time allow the cause of your pain to be found.    Generally, every Emergency Department visit should have a follow-up clinic visit with either a primary or a specialty clinic/provider. Please follow-up as instructed by your emergency provider today. With abdominal pain, we often recommend very close follow-up, such as the following day.    ADULTS:  Return to the Emergency Department right away if:      You get an oral temperature above 102oF or as directed by your provider.    You have blood in your stools. This may be bright red or appear as black, tarry stools.      You keep vomiting (throwing up) or cannot drink liquids.    You see blood when you vomit.     You cannot have a bowel movement or you cannot pass gas.    Your stomach gets bloated or bigger.    Your skin or the whites of your eyes look yellow.    You faint.    You have bloody, frequent or painful urination (peeing).    You have new symptoms or anything that worries you.    CHILDREN:  Return to the Emergency Department right away if your child has any of the above-listed symptoms or the following:      Pushes your hand away or screams/cries when his/her belly is touched.    You notice your child is very fussy or weak.    Your child is very tired and is too tired to eat or drink.    Your child is dehydrated.  Signs of dehydration can be:  o Significant change in the amount of wet  diapers/urine.  o Your infant or child starts to have dry mouth and lips, or no saliva (spit) or tears.    PREGNANT WOMEN:  Return to the Emergency Department right away if you have any of the above-listed symptoms or the following:      You have bleeding, leaking fluid or passing tissue from the vagina.    You have worse pain or cramping, or pain in your shoulder or back.    You have vomiting that will not stop.    You have a temperature of 100oF or more.    Your baby is not moving as much as usual.    You faint.    You get a bad headache with or without eye problems and abdominal pain.    You have a seizure.    You have unusual discharge from your vagina and abdominal pain.    Abdominal pain is pretty common during pregnancy.  Your pain may or may not be related to your pregnancy. You should follow-up closely with your OB provider so they can evaluate you and your baby.  Until you follow-up with your regular provider, do the following:       Avoid sex and do not put anything in your vagina.    Drink clear fluids.    Only take medications approved by your provider.    MORE INFORMATION:    Appendicitis:  A possible cause of abdominal pain in any person who still has their appendix is acute appendicitis. Appendicitis is often hard to diagnose.  Testing does not always rule out early appendicitis or other causes of abdominal pain. Close follow-up with your provider and re-evaluations may be needed to figure out the reason for your abdominal pain.    Follow-up:  It is very important that you make an appointment with your clinic and go to the appointment.  If you do not follow-up with your primary provider, it may result in missing an important development which could result in permanent injury or disability and/or lasting pain.  If there is any problem keeping your appointment, call your provider or return to the Emergency Department.    Medications:  Take your medications as directed by your provider today.  Before  "using over-the-counter medications, ask your provider and make sure to take the medications as directed.  If you have any questions about medications, ask your provider.    Diet:  Resume your normal diet as much as possible, but do not eat fried, fatty or spicy foods while you have pain.  Do not drink alcohol or have caffeine.  Do not smoke tobacco.    Probiotics: If you have been given an antibiotic, you may want to also take a probiotic pill or eat yogurt with live cultures. Probiotics have \"good bacteria\" to help your intestines stay healthy. Studies have shown that probiotics help prevent diarrhea (loose stools) and other intestine problems (including C. diff infection) when you take antibiotics. You can buy these without a prescription in the pharmacy section of the store.     If you were given a prescription for medicine here today, be sure to read all of the information (including the package insert) that comes with your prescription.  This will include important information about the medicine, its side effects, and any warnings that you need to know about.  The pharmacist who fills the prescription can provide more information and answer questions you may have about the medicine.  If you have questions or concerns that the pharmacist cannot address, please call or return to the Emergency Department.       Remember that you can always come back to the Emergency Department if you are not able to see your regular provider in the amount of time listed above, if you get any new symptoms, or if there is anything that worries you.      Discharge Instructions  Constipation  Constipation can cause severe cramping pain and your provider thinks this might be the cause of your abdominal pain (belly pain) today.  People usually recognize that they are constipated because they have difficulty having bowel movements, are not having bowel movements frequently enough, or are not having large enough bowel movements. " Sometimes, especially in children or older people, you do not recognize that you are constipated until it becomes severe. The most common causes of constipation are a lack of exercise and not eating enough fruits, vegetables, and whole grains. Constipation can also be a side effect of medications, such as narcotics, or may be caused by a disease of the digestive system.    Generally, every Emergency Department visit should have a follow-up clinic visit with either a primary or a specialty clinic/provider. Please follow-up as instructed by your emergency provider today. Sometimes, chronic constipation requires further testing to determine the cause. If you are over 50 years old, you may need a colonoscopy if you have not had one before.     Return to the Emergency Department if:    Your abdominal pain worsens or does not improve after a bowel movement.    You become very weak.    You get a temperature above 102oF or as directed by your provider.    You have blood in your stools (bright red or black, tarry stools).    You keep vomiting (throwing up) or cannot drink liquids.    Your see blood when you vomit.    Your stomach gets bloated or bigger.    You have new symptoms or anything that worries you.    What can I do to help myself?    If your provider gave you a cathartic medication, like magnesium citrate or GoLytely  (polyethylene glycol), you can expect to have cramps and gas pains after taking it. You can expect to have a number of bowel movements and even diarrhea (loose or watery stools) in the course of clearing your bowels.  You will know your bowels have been cleaned out after you pass clear liquid. The cramps and gas should let up after you have emptied your bowels. You may want to wait until morning to take this type of medication so you aren t up in the night.     Sometimes instead of cathartics, we recommend laxatives like milk of magnesia to move your bowels more slowly, or an enema to help the bowels  to move. Read and follow the package directions, or follow your provider s instructions.    Once you have become very constipated, it takes time for your bowels to return to normal and you need to be very careful to prevent becoming constipated again. Take a laxative if you do not move your bowels at least every two days.       Eat foods that have a lot of fiber. Good choices are fruits, vegetables, prune juice, apple juice, and high fiber cereal. Limit dairy products such as milk and cheese, since these can make constipation worse.     Drink plenty of water.     When you feel the need to go to the bathroom, go to the bathroom. Do not hold it.    Miralax , Metamucil , Colace , Senna or fiber supplements can be used daily.  Miralax  daily is often the best choice for children.  If you were given a prescription for medicine here today, be sure to read all of the information (including the package insert) that comes with your prescription.  This will include important information about the medicine, its side effects, and any warnings that you need to know about.  The pharmacist who fills the prescription can provide more information and answer questions you may have about the medicine.  If you have questions or concerns that the pharmacist cannot address, please call or return to the Emergency Department.   Remember that you can always come back to the Emergency Department if you are not able to see your regular provider in the amount of time listed above, if you get any new symptoms, or if there is anything that worries you.

## 2018-01-29 ENCOUNTER — TELEPHONE (OUTPATIENT)
Dept: EMERGENCY MEDICINE | Facility: CLINIC | Age: 23
End: 2018-01-29

## 2018-01-29 DIAGNOSIS — N39.0 UTI (URINARY TRACT INFECTION): ICD-10-CM

## 2018-01-29 NOTE — TELEPHONE ENCOUNTER
[facility] Emergency Department Lab result notification [Adult-Male]    [facility] ED lab result protocol used  Urine Culture Protocol    Reason for call  Notify of lab results, assess symptoms,  review ED providers recommendations/discharge instructions (if necessary) and advise per ED lab result f/u protocol    Lab Result (including Rx patient on, if applicable)  Preliminary urine culture report on 1/29/18 shows the presence of bacteria(s):  10,000 to 50,000 colonies/mL Gram positive cocci  [facility] ED discharge antibiotic: None  Recommendations per [facility] ED Lab result protocol - Urine culture protocol.    Information table from ED Provider visit on 01/27/2018  ED diagnosis: Abdominal pain, generalized   ED provider Mahesh Levin MD   Symptoms reported at ED visit (Chief complaint, HPI) a 22 year old male, with a history of Chron's Diease and psoas abscess, who presents with abdominal pain. The patient's states that he has had upper right to mid epigastric abdominal pain with associated nausea. He notes that it is similar in nature to his past Crohn's flare ups. He denies any vomiting, diarrhea, blood or mucus present in stool, or urinary symptoms. He additionally denies fever, past history of obstructions or kidney stones. The patient notes that he was last seen 2 weeks about by Dr. Suazo at [facility] ED. There, he had a CT performed which was normal. The patient notes that he follows with Dr. Disla of [facility]. He has not visited Dr. Disla for an appointment for awhile. His last colonoscopy was a year ago.    ED providers Impression and Plan (applicable information) a 22 year old male with a history of Crohn's Disease who presents with right sided abdominal pain. No on going diarrhea or frequent blood or mucus in stool. Concerned for obstruction. Less likely ureteral stone, right sided diverticulitis, UTI as he has no dysuria, frequency or urgency. Also less likely testicular torsion or  incarcerated inguinal hernia. We will do an abdominal XR, urinalysis, and basic blood test. If unremarkable, I believe we can send him home with conservative management. Exam is benign. I have low suspicion that he has a recurrent, right psoas abscess which has presented with right sided abdominal pain in the past. He had a CT scan done on the 1/12 at [facility] which was normal. Given his overall well appearance, lack of fever, concerning examination, leukocytosis, I feel he can follow up in clinic at this point instead of undergoing a CT scan which he has had many in the past.      XR unremarkable for obstruction. Basic blood tests are unremarkable. Mild pyuria, but no dysuria. We will not empirically treat. We will do a urine culture to see if pathogenic organism grows. I think he can be discharged to home. He is comfortable and in agreement with that plan. Given the findings of mild to large stool, we will give him some Miralax to help him have consistent bowel movements.    Significant Medical hx, if applicable Reviewed   Coumadin/Warfarin [Yes or No] no   Creatinine Level (mg/dl) 0.91   Creatinine clearance (ml/min), if applicable 116   Allergies Percocet   Weight, if applicable 64.6 kg      RN Assessment (Patient s current Symptoms), include time called.  [Insert Left message here if message left]  At 1200 Left voicemail message requesting a call back to [phone] between 10 a.m. and 6:30 p.m., 7 days a week for patient's ED/UC lab results.  May leave a message 24/7, if no one available.     Nia Lea RN  [facility] RN  Lung Nodule and ED Lab Result F/u RN  Epic pool (ED late result f/u RN): [id number]  [facility] INCIDENTAL RADIOLOGY F/U NURSES: [id number]  Ph# [phone]       Copy of Lab result   Preliminary Result   Exam Information   Exam Date Exam Time Accession # Results    1/27/18  8:20 PM [id number]    Component Results   Component Collected Lab   Specimen Description 01/27/2018   8:20    Midstream Urine   Special Requests 01/27/2018  8:20 PM 75   Specimen received in preservative   Culture Micro (Abnormal) 01/27/2018  8:20    10,000 to 50,000 colonies/mL   Gram positive cocci      Culture Micro 01/27/2018  8:20    <10,000 colonies/mL   urogenital partha   Susceptibility testing not routinely done      Culture Micro 01/27/2018  8:20    Culture in progress

## 2018-01-29 NOTE — LETTER
February 2, 2018        Giovanni Carranza  [address] [place] [address]          Dear Giovanni Carranza:    You were seen in the [place] [facility] at [facility] EMERGENCY DEPARTMENT on 1/27/2018.  We are unable to reach you by phone, so we are sending you this letter.     It is important that you call [place] [facility] lab result nurse at [phone] as we have to make some changes in your treatment.     Best time to call back is between 10 a.m. and 6 p.m.      Sincerely,           [place] ED Lab Result RN  [phone]

## 2018-01-30 LAB
BACTERIA SPEC CULT: ABNORMAL
BACTERIA SPEC CULT: ABNORMAL
Lab: ABNORMAL
SPECIMEN SOURCE: ABNORMAL

## 2018-01-30 NOTE — TELEPHONE ENCOUNTER
[facility] Emergency Department Lab result notification:    Reason for call  Notify of lab results, assess symptoms,  review ED providers recommendations (if necessary) and advise per ED lab result f/u protocol.    Lab result  Urine Culture [[id number]] (Order [id number])   Preliminary Result   Exam Information   Exam Date Exam Time Accession # Results    1/27/18  8:20 PM [id number]    Component Results   Component Collected Lab   Specimen Description 01/27/2018  8:20    Midstream Urine   Special Requests 01/27/2018  8:20 PM 75   Specimen received in preservative   Culture Micro (Abnormal) 01/27/2018  8:20    10,000 to 50,000 colonies/mL   Enterococcus faecalis   Susceptibility testing in progress      Culture Micro 01/27/2018  8:20    <10,000 colonies/mL   urogenital partha   Susceptibility testing not routinely done      Left voicemail message requesting a call back to [phone] between 10 a.m. and 6:30 p.m. for patient's ED/UC lab results.    Elan Guadarrama RN  [facility] RN  Lung Nodule and ED Lab Result F/u RN  Epic pool (ED late result f/u RN): [id number]  [facility] INCIDENTAL RADIOLOGY F/U NURSES: P [id number]  # [phone]

## 2018-01-31 RX ORDER — NITROFURANTOIN 25; 75 MG/1; MG/1
100 CAPSULE ORAL 2 TIMES DAILY
Qty: 10 CAPSULE | Refills: 0 | Status: CANCELLED | OUTPATIENT
Start: 2018-01-31 | End: 2018-02-05

## 2018-01-31 NOTE — TELEPHONE ENCOUNTER
[facility] Emergency Department Lab result notification [Adult-Male]    [facility] ED lab result protocol used  Urine culture    Reason for call  Notify of lab results, assess symptoms,  review ED providers recommendations/discharge instructions (if necessary) and advise per ED lab result f/u protocol    Lab Result (including Rx patient on, if applicable)  Final urine culture on 1/31/18 shows the presence of bacteria(s): 10,000 to 50,000 colonies/mL Enterococcus faecalis  [facility] [place] ED discharge antibiotic: None  As per  ED lab result protocol, treat per Urine culture protocol    Information table from ED Provider visit on 01/27/2018  ED diagnosis: Abdominal pain, generalized   ED provider Mahesh Levin MD   Symptoms reported at ED visit (Chief complaint, HPI) a 22 year old male, with a history of Chron's Diease and psoas abscess, who presents with abdominal pain. The patient's states that he has had upper right to mid epigastric abdominal pain with associated nausea. He notes that it is similar in nature to his past Crohn's flare ups. He denies any vomiting, diarrhea, blood or mucus present in stool, or urinary symptoms. He additionally denies fever, past history of obstructions or kidney stones. The patient notes that he was last seen 2 weeks about by Dr. Suazo at [facility] ED. There, he had a CT performed which was normal. The patient notes that he follows with Dr. Disla of [facility]. He has not visited Dr. Disla for an appointment for awhile. His last colonoscopy was a year ago.    ED providers Impression and Plan (applicable information) a 22 year old male with a history of Crohn's Disease who presents with right sided abdominal pain. No on going diarrhea or frequent blood or mucus in stool. Concerned for obstruction. Less likely ureteral stone, right sided diverticulitis, UTI as he has no dysuria, frequency or urgency. Also less likely testicular torsion or incarcerated inguinal hernia. We  will do an abdominal XR, urinalysis, and basic blood test. If unremarkable, I believe we can send him home with conservative management. Exam is benign. I have low suspicion that he has a recurrent, right psoas abscess which has presented with right sided abdominal pain in the past. He had a CT scan done on the 1/12 at [facility] which was normal. Given his overall well appearance, lack of fever, concerning examination, leukocytosis, I feel he can follow up in clinic at this point instead of undergoing a CT scan which he has had many in the past.       XR unremarkable for obstruction. Basic blood tests are unremarkable. Mild pyuria, but no dysuria. We will not empirically treat. We will do a urine culture to see if pathogenic organism grows. I think he can be discharged to home. He is comfortable and in agreement with that plan. Given the findings of mild to large stool, we will give him some Miralax to help him have consistent bowel movements.    Significant Medical hx, if applicable Reviewed   Coumadin/Warfarin [Yes or No] no   Creatinine Level (mg/dl) 0.91   Creatinine clearance (ml/min), if applicable 116   Allergies Percocet   Weight, if applicable 64.6 kg        RN Assessment (Patient s current Symptoms), include time called.  [Insert Left message here if message left]  At 10:05A, Left voicemail message requesting a call back to [phone] between 10 a.m. and 6:30 p.m., 7 days a week for patient's ED/UC lab results.        [RN Name]  Elan Guadarrama RN  [facility] RN  Lung Nodule and ED Lab Result F/u RN  Epic pool (ED late result f/u RN): [id number]  [facility] INCIDENTAL RADIOLOGY F/U NURSES: [id number]  Ph# [phone]       Copy of Lab result   Urine Culture [[id number]] (Order [id number])   Exam Information   Exam Date Exam Time Accession # Results    1/27/18  8:20 PM [id number]    Component Results   Component Collected Lab   Specimen Description 01/27/2018  8:20 PM 75   Midstream Urine   Special  Requests 01/27/2018  8:20 PM 75   Specimen received in preservative   Culture Micro (Abnormal) 01/27/2018  8:20 PM 75   10,000 to 50,000 colonies/mL   Enterococcus faecalis      Culture Micro 01/27/2018  8:20 PM 75   <10,000 colonies/mL   urogenital partha   Susceptibility testing not routinely done      Culture & Susceptibility   ENTEROCOCCUS FAECALIS   Antibiotic Interpretation Sensitivity Unit Method Status   AMPICILLIN Sensitive <=2 ug/mL DAI Final   NITROFURANTOIN Sensitive <=16 ug/mL DAI Final   PENICILLIN Sensitive 8 ug/mL DAI Final   VANCOMYCIN Sensitive 1 ug/mL DAI Final

## 2018-02-02 NOTE — TELEPHONE ENCOUNTER
Unable to reach via telephone.  Certified letter sent with message requesting a call back to [phone] between 10 a.m. and 6:30 p.m., 7 days a week for patient's ED/UC lab results.    Elan Guadarrama, RN  [facility] RN  Lung Nodule and ED Lab Result F/u RN  Epic pool (ED late result f/u RN): [id number]  [facility] INCIDENTAL RADIOLOGY F/U NURSES: P [id number]  # [phone]

## 2018-09-01 ENCOUNTER — HOSPITAL ENCOUNTER (EMERGENCY)
Facility: CLINIC | Age: 23
Discharge: HOME | End: 2018-09-01
Attending: EMERGENCY MEDICINE | Admitting: EMERGENCY MEDICINE
Payer: COMMERCIAL

## 2018-09-01 ENCOUNTER — APPOINTMENT (OUTPATIENT)
Dept: GENERAL RADIOLOGY | Facility: CLINIC | Age: 23
End: 2018-09-01
Attending: EMERGENCY MEDICINE
Payer: COMMERCIAL

## 2018-09-01 VITALS
HEART RATE: 63 BPM | OXYGEN SATURATION: 99 % | DIASTOLIC BLOOD PRESSURE: 65 MMHG | SYSTOLIC BLOOD PRESSURE: 117 MMHG | HEIGHT: 68 IN | BODY MASS INDEX: 20.92 KG/M2 | RESPIRATION RATE: 20 BRPM | WEIGHT: 138 LBS | TEMPERATURE: 98.3 F

## 2018-09-01 DIAGNOSIS — R10.84 ABDOMINAL PAIN, GENERALIZED: ICD-10-CM

## 2018-09-01 DIAGNOSIS — Z87.19 HISTORY OF CROHN'S DISEASE: ICD-10-CM

## 2018-09-01 DIAGNOSIS — Z90.49 HISTORY OF BOWEL RESECTION: ICD-10-CM

## 2018-09-01 LAB
ALBUMIN SERPL-MCNC: 3.9 G/DL (ref 3.4–5)
ALP SERPL-CCNC: 74 U/L (ref 40–150)
ALT SERPL W P-5'-P-CCNC: 30 U/L (ref 0–70)
ANION GAP SERPL CALCULATED.3IONS-SCNC: 6 MMOL/L (ref 3–14)
AST SERPL W P-5'-P-CCNC: 24 U/L (ref 0–45)
BASOPHILS # BLD AUTO: 0 10E9/L (ref 0–0.2)
BASOPHILS NFR BLD AUTO: 0.6 %
BILIRUB SERPL-MCNC: 0.4 MG/DL (ref 0.2–1.3)
BUN SERPL-MCNC: 10 MG/DL (ref 7–30)
CALCIUM SERPL-MCNC: 8.4 MG/DL (ref 8.5–10.1)
CHLORIDE SERPL-SCNC: 108 MMOL/L (ref 94–109)
CO2 SERPL-SCNC: 26 MMOL/L (ref 20–32)
CREAT SERPL-MCNC: 0.86 MG/DL (ref 0.66–1.25)
CRP SERPL-MCNC: <2.9 MG/L (ref 0–8)
DIFFERENTIAL METHOD BLD: ABNORMAL
EOSINOPHIL # BLD AUTO: 0.3 10E9/L (ref 0–0.7)
EOSINOPHIL NFR BLD AUTO: 5.1 %
ERYTHROCYTE [DISTWIDTH] IN BLOOD BY AUTOMATED COUNT: 20.5 % (ref 10–15)
GFR SERPL CREATININE-BSD FRML MDRD: >90 ML/MIN/1.7M2
GLUCOSE SERPL-MCNC: 85 MG/DL (ref 70–99)
HCT VFR BLD AUTO: 40.3 % (ref 40–53)
HGB BLD-MCNC: 12.2 G/DL (ref 13.3–17.7)
IMM GRANULOCYTES # BLD: 0 10E9/L (ref 0–0.4)
IMM GRANULOCYTES NFR BLD: 0 %
LIPASE SERPL-CCNC: 151 U/L (ref 73–393)
LYMPHOCYTES # BLD AUTO: 2 10E9/L (ref 0.8–5.3)
LYMPHOCYTES NFR BLD AUTO: 37.7 %
MCH RBC QN AUTO: 23.6 PG (ref 26.5–33)
MCHC RBC AUTO-ENTMCNC: 30.3 G/DL (ref 31.5–36.5)
MCV RBC AUTO: 78 FL (ref 78–100)
MONOCYTES # BLD AUTO: 0.4 10E9/L (ref 0–1.3)
MONOCYTES NFR BLD AUTO: 6.8 %
NEUTROPHILS # BLD AUTO: 2.7 10E9/L (ref 1.6–8.3)
NEUTROPHILS NFR BLD AUTO: 49.8 %
NRBC # BLD AUTO: 0 10*3/UL
NRBC BLD AUTO-RTO: 0 /100
PLATELET # BLD AUTO: 261 10E9/L (ref 150–450)
POTASSIUM SERPL-SCNC: 4.2 MMOL/L (ref 3.4–5.3)
PROT SERPL-MCNC: 7.7 G/DL (ref 6.8–8.8)
RBC # BLD AUTO: 5.18 10E12/L (ref 4.4–5.9)
SODIUM SERPL-SCNC: 140 MMOL/L (ref 133–144)
WBC # BLD AUTO: 5.3 10E9/L (ref 4–11)

## 2018-09-01 PROCEDURE — 85025 COMPLETE CBC W/AUTO DIFF WBC: CPT | Performed by: EMERGENCY MEDICINE

## 2018-09-01 PROCEDURE — 74019 RADEX ABDOMEN 2 VIEWS: CPT

## 2018-09-01 PROCEDURE — 80053 COMPREHEN METABOLIC PANEL: CPT | Performed by: EMERGENCY MEDICINE

## 2018-09-01 PROCEDURE — 86140 C-REACTIVE PROTEIN: CPT | Performed by: EMERGENCY MEDICINE

## 2018-09-01 PROCEDURE — 25000132 ZZH RX MED GY IP 250 OP 250 PS 637: Performed by: EMERGENCY MEDICINE

## 2018-09-01 PROCEDURE — 99284 EMERGENCY DEPT VISIT MOD MDM: CPT | Mod: 25

## 2018-09-01 PROCEDURE — 96361 HYDRATE IV INFUSION ADD-ON: CPT

## 2018-09-01 PROCEDURE — 25000128 H RX IP 250 OP 636: Performed by: EMERGENCY MEDICINE

## 2018-09-01 PROCEDURE — 83690 ASSAY OF LIPASE: CPT | Performed by: EMERGENCY MEDICINE

## 2018-09-01 PROCEDURE — 96374 THER/PROPH/DIAG INJ IV PUSH: CPT

## 2018-09-01 RX ORDER — ONDANSETRON 4 MG/1
4 TABLET, ORALLY DISINTEGRATING ORAL EVERY 8 HOURS PRN
Qty: 10 TABLET | Refills: 0 | Status: SHIPPED | OUTPATIENT
Start: 2018-09-01 | End: 2018-10-03

## 2018-09-01 RX ORDER — KETOROLAC TROMETHAMINE 15 MG/ML
15 INJECTION, SOLUTION INTRAMUSCULAR; INTRAVENOUS ONCE
Status: COMPLETED | OUTPATIENT
Start: 2018-09-01 | End: 2018-09-01

## 2018-09-01 RX ORDER — ACETAMINOPHEN 500 MG
1000 TABLET ORAL ONCE
Status: DISCONTINUED | OUTPATIENT
Start: 2018-09-01 | End: 2018-09-01 | Stop reason: HOSPADM

## 2018-09-01 RX ORDER — HYDROCODONE BITARTRATE AND ACETAMINOPHEN 5; 325 MG/1; MG/1
2 TABLET ORAL ONCE
Status: COMPLETED | OUTPATIENT
Start: 2018-09-01 | End: 2018-09-01

## 2018-09-01 RX ADMIN — HYDROCODONE BITARTRATE AND ACETAMINOPHEN 2 TABLET: 5; 325 TABLET ORAL at 17:50

## 2018-09-01 RX ADMIN — KETOROLAC TROMETHAMINE 15 MG: 15 INJECTION, SOLUTION INTRAMUSCULAR; INTRAVENOUS at 16:43

## 2018-09-01 RX ADMIN — SODIUM CHLORIDE 1000 ML: 9 INJECTION, SOLUTION INTRAVENOUS at 16:36

## 2018-09-01 ASSESSMENT — ENCOUNTER SYMPTOMS: ABDOMINAL PAIN: 1

## 2018-09-01 NOTE — ED AVS SNAPSHOT
Emergency Department    [address] [facility] [address]    Phone:  [phone]    Fax:  [phone]                                       Giovanni Carranza   MRN: [id number]    Department:   Emergency Department   Date of Visit:  9/1/2018           Patient Information     Date Of Birth          1995        Your diagnoses for this visit were:     Abdominal pain, generalized     History of Crohn's disease     History of bowel resection        You were seen by Teddy Francois MD.      Follow-up Information     Schedule an appointment as soon as possible for a visit with Andrea Alarcon MD.    Specialty:  Gastroenterology    Why:  to re-establish care with a GI specialist    Contact information:    [facility]  [address] [facility] [address] [phone]          Follow up with Vero Schwartz NP. Call in 3 days.    Contact information:    [facility]  [address] [facility] [address] [phone]          Follow up with  Emergency Department.    Specialty:  EMERGENCY MEDICINE    Why:  As needed, If symptoms worsen    Contact information:    [address] [facility] [id number] [phone]        Discharge Instructions       Discharge Instructions  Abdominal Pain    Abdominal pain (belly pain) can be caused by many things. Your evaluation today does not show the exact cause for your pain. Your provider today has decided that it is unlikely your pain is due to a life threatening problem, or a problem requiring surgery or hospital admission. Sometimes those problems cannot be found right away, so it is very important that you follow up as directed.  Sometimes only the changes which occur over time allow the cause of your pain to be found.    Generally, every Emergency Department visit should have a follow-up clinic visit with either a primary or a specialty clinic/provider. Please follow-up as instructed by your emergency  provider today. With abdominal pain, we often recommend very close follow-up, such as the following day.    ADULTS:  Return to the Emergency Department right away if:      You get an oral temperature above 102oF or as directed by your provider.    You have blood in your stools. This may be bright red or appear as black, tarry stools.      You keep vomiting (throwing up) or cannot drink liquids.    You see blood when you vomit.     You cannot have a bowel movement or you cannot pass gas.    Your stomach gets bloated or bigger.    Your skin or the whites of your eyes look yellow.    You faint.    You have bloody, frequent or painful urination (peeing).    You have new symptoms or anything that worries you.    CHILDREN:  Return to the Emergency Department right away if your child has any of the above-listed symptoms or the following:      Pushes your hand away or screams/cries when his/her belly is touched.    You notice your child is very fussy or weak.    Your child is very tired and is too tired to eat or drink.    Your child is dehydrated.  Signs of dehydration can be:  o Significant change in the amount of wet diapers/urine.  o Your infant or child starts to have dry mouth and lips, or no saliva (spit) or tears.    PREGNANT WOMEN:  Return to the Emergency Department right away if you have any of the above-listed symptoms or the following:      You have bleeding, leaking fluid or passing tissue from the vagina.    You have worse pain or cramping, or pain in your shoulder or back.    You have vomiting that will not stop.    You have a temperature of 100oF or more.    Your baby is not moving as much as usual.    You faint.    You get a bad headache with or without eye problems and abdominal pain.    You have a seizure.    You have unusual discharge from your vagina and abdominal pain.    Abdominal pain is pretty common during pregnancy.  Your pain may or may not be related to your pregnancy. You should follow-up  "closely with your OB provider so they can evaluate you and your baby.  Until you follow-up with your regular provider, do the following:       Avoid sex and do not put anything in your vagina.    Drink clear fluids.    Only take medications approved by your provider.    MORE INFORMATION:    Appendicitis:  A possible cause of abdominal pain in any person who still has their appendix is acute appendicitis. Appendicitis is often hard to diagnose.  Testing does not always rule out early appendicitis or other causes of abdominal pain. Close follow-up with your provider and re-evaluations may be needed to figure out the reason for your abdominal pain.    Follow-up:  It is very important that you make an appointment with your clinic and go to the appointment.  If you do not follow-up with your primary provider, it may result in missing an important development which could result in permanent injury or disability and/or lasting pain.  If there is any problem keeping your appointment, call your provider or return to the Emergency Department.    Medications:  Take your medications as directed by your provider today.  Before using over-the-counter medications, ask your provider and make sure to take the medications as directed.  If you have any questions about medications, ask your provider.    Diet:  Resume your normal diet as much as possible, but do not eat fried, fatty or spicy foods while you have pain.  Do not drink alcohol or have caffeine.  Do not smoke tobacco.    Probiotics: If you have been given an antibiotic, you may want to also take a probiotic pill or eat yogurt with live cultures. Probiotics have \"good bacteria\" to help your intestines stay healthy. Studies have shown that probiotics help prevent diarrhea (loose stools) and other intestine problems (including C. diff infection) when you take antibiotics. You can buy these without a prescription in the pharmacy section of the store.     If you were given a " prescription for medicine here today, be sure to read all of the information (including the package insert) that comes with your prescription.  This will include important information about the medicine, its side effects, and any warnings that you need to know about.  The pharmacist who fills the prescription can provide more information and answer questions you may have about the medicine.  If you have questions or concerns that the pharmacist cannot address, please call or return to the Emergency Department.       Remember that you can always come back to the Emergency Department if you are not able to see your regular provider in the amount of time listed above, if you get any new symptoms, or if there is anything that worries you.      24 Hour Appointment Hotline       To make an appointment at any [facility], call [phone] ([phone]). If you don't have a family doctor or clinic, we will help you find one. [facility] clinics are conveniently located to serve the needs of you and your family.             Review of your medicines      START taking        Dose / Directions Last dose taken    omeprazole 20 MG CR capsule   Commonly known as:  priLOSEC   Dose:  20 mg   Quantity:  30 capsule        Take 1 capsule (20 mg) by mouth daily   Refills:  0        ondansetron 4 MG ODT tab   Commonly known as:  ZOFRAN ODT   Dose:  4 mg   Quantity:  10 tablet        Take 1 tablet (4 mg) by mouth every 8 hours as needed for nausea   Refills:  0          Our records show that you are taking the medicines listed below. If these are incorrect, please call your family doctor or clinic.        Dose / Directions Last dose taken    REMICADE IV        Every 2 months. (Patient gets infusions, current dose unknown)   Refills:  0                Prescriptions were sent or printed at these locations (2 Prescriptions)                   Other Prescriptions                Printed at Department/Unit printer (2 of 2)          omeprazole (PRILOSEC) 20 MG CR capsule               ondansetron (ZOFRAN ODT) 4 MG ODT tab                Procedures and tests performed during your visit     CBC with platelets differential    CRP inflammation    Comprehensive metabolic panel    Lipase    Peripheral IV catheter    XR Abdomen 2 Views      Orders Needing Specimen Collection     None      Pending Results     Date and Time Order Name Status Description    9/1/2018 1627 XR Abdomen 2 Views Preliminary             Pending Culture Results     No orders found from 8/30/2018 to 9/2/2018.            Pending Results Instructions     If you had any lab results that were not finalized at the time of your Discharge, you can call the ED Lab Result RN at [phone]. You will be contacted by this team for any positive Lab results or changes in treatment. The nurses are available 7 days a week from 10A to 6:30P.  You can leave a message 24 hours per day and they will return your call.        Test Results From Your Hospital Stay        9/1/2018  4:46 PM      Component Results     Component Value Ref Range & Units Status    WBC 5.3 4.0 - 11.0 10e9/L Final    RBC Count 5.18 4.4 - 5.9 10e12/L Final    Hemoglobin 12.2 (L) 13.3 - 17.7 g/dL Final    Hematocrit 40.3 40.0 - 53.0 % Final    MCV 78 78 - 100 fl Final    MCH 23.6 (L) 26.5 - 33.0 pg Final    MCHC 30.3 (L) 31.5 - 36.5 g/dL Final    RDW 20.5 (H) 10.0 - 15.0 % Final    Platelet Count 261 150 - 450 10e9/L Final    Diff Method Automated Method  Final    % Neutrophils 49.8 % Final    % Lymphocytes 37.7 % Final    % Monocytes 6.8 % Final    % Eosinophils 5.1 % Final    % Basophils 0.6 % Final    % Immature Granulocytes 0.0 % Final    Nucleated RBCs 0 0 /100 Final    Absolute Neutrophil 2.7 1.6 - 8.3 10e9/L Final    Absolute Lymphocytes 2.0 0.8 - 5.3 10e9/L Final    Absolute Monocytes 0.4 0.0 - 1.3 10e9/L Final    Absolute Eosinophils 0.3 0.0 - 0.7 10e9/L Final    Absolute Basophils 0.0 0.0 - 0.2 10e9/L Final    Abs  Immature Granulocytes 0.0 0 - 0.4 10e9/L Final    Absolute Nucleated RBC 0.0  Final         9/1/2018  5:06 PM      Component Results     Component Value Ref Range & Units Status    Sodium 140 133 - 144 mmol/L Final    Potassium 4.2 3.4 - 5.3 mmol/L Final    Chloride 108 94 - 109 mmol/L Final    Carbon Dioxide 26 20 - 32 mmol/L Final    Anion Gap 6 3 - 14 mmol/L Final    Glucose 85 70 - 99 mg/dL Final    Urea Nitrogen 10 7 - 30 mg/dL Final    Creatinine 0.86 0.66 - 1.25 mg/dL Final    GFR Estimate >90 >60 mL/min/1.7m2 Final    Non  GFR Calc    GFR Estimate If Black >90 >60 mL/min/1.7m2 Final    African American GFR Calc    Calcium 8.4 (L) 8.5 - 10.1 mg/dL Final    Bilirubin Total 0.4 0.2 - 1.3 mg/dL Final    Albumin 3.9 3.4 - 5.0 g/dL Final    Protein Total 7.7 6.8 - 8.8 g/dL Final    Alkaline Phosphatase 74 40 - 150 U/L Final    ALT 30 0 - 70 U/L Final    AST 24 0 - 45 U/L Final         9/1/2018  5:03 PM      Component Results     Component Value Ref Range & Units Status    Lipase 151 73 - 393 U/L Final         9/1/2018  5:00 PM      Narrative     XR ABDOMEN 2 VW   9/1/2018 4:56 PM      HISTORY:  acute abd pain, h/o bowel resection for Crohn's;     COMPARISON: None.        Impression     IMPRESSION: No free air. A surgical suture line is seen in the right  lower quadrant of the abdomen. There is a moderate amount of what  appears to be stool in the right colon. There are a few mildly dilated  loops of small bowel in the left midabdomen but no air-fluid levels  are identified.           9/1/2018  5:03 PM      Component Results     Component Value Ref Range & Units Status    CRP Inflammation <2.9 0.0 - 8.0 mg/L Final                Clinical Quality Measure: Blood Pressure Screening     Your blood pressure was checked while you were in the emergency department today. The last reading we obtained was  BP: 117/65 . Please read the guidelines below about what these numbers mean and what you should do about  "them.  If your systolic blood pressure (the top number) is less than 120 and your diastolic blood pressure (the bottom number) is less than 80, then your blood pressure is normal. There is nothing more that you need to do about it.  If your systolic blood pressure (the top number) is 120-139 or your diastolic blood pressure (the bottom number) is 80-89, your blood pressure may be higher than it should be. You should have your blood pressure rechecked within a year by a primary care provider.  If your systolic blood pressure (the top number) is 140 or greater or your diastolic blood pressure (the bottom number) is 90 or greater, you may have high blood pressure. High blood pressure is treatable, but if left untreated over time it can put you at risk for heart attack, stroke, or kidney failure. You should have your blood pressure rechecked by a primary care provider within the next 4 weeks.  If your provider in the emergency department today gave you specific instructions to follow-up with your doctor or provider even sooner than that, you should follow that instruction and not wait for up to 4 weeks for your follow-up visit.        Thank you for choosing [facility]       Thank you for choosing [facility] for your care. Our goal is always to provide you with excellent care. Hearing back from our patients is one way we can continue to improve our services. Please take a few minutes to complete the written survey that you may receive in the mail after you visit with us. Thank you!        [employer] Information     [facility] lets you send messages to your doctor, view your test results, renew your prescriptions, schedule appointments and more. To sign up, go to [email or web address] . Click on \"Log in\" on the left side of the screen, which will take you to the Welcome page. Then click on \"Sign up Now\" on the right side of the page.     You will be asked to enter the access code listed below, as well as some personal " information. Please follow the directions to create your username and password.     Your access code is: [email or web address]  Expires: 2018  5:55 PM     Your access code will  in 90 days. If you need help or a new code, please call your [facility] clinic or [phone].        Care EveryWhere ID     This is your Care EveryWhere ID. This could be used by other organizations to access your [facility] medical records  [id number]        Equal Access to Services     [facility] : Anjali urbano Sonatalee, waaxda luqadaha, qaybta kaalmada nick, eloy blakely . So [facility] [phone].    ATENCIÓN: Si habla español, tiene a rodriguez disposición servicios gratuitos de asistencia lingüística. Llame al [phone].    We comply with applicable federal civil rights laws and [place] laws. We do not discriminate on the basis of race, color, national origin, age, disability, sex, sexual orientation, or gender identity.            After Visit Summary       This is your record. Keep this with you and show to your community pharmacist(s) and doctor(s) at your next visit.

## 2018-09-01 NOTE — ED AVS SNAPSHOT
Emergency Department    [address] [place] [address]    Phone:  [phone]    Fax:  [phone]                                       Giovanni Carranza   MRN: [id number]    Department:   Emergency Department   Date of Visit:  9/1/2018           After Visit Summary Signature Page     I have received my discharge instructions, and my questions have been answered. I have discussed any challenges I see with this plan with the nurse or doctor.    ..........................................................................................................................................  Patient/Patient Representative Signature      ..........................................................................................................................................  Patient Representative Print Name and Relationship to Patient    ..................................................               ................................................  Date                                            Time    ..........................................................................................................................................  Reviewed by Signature/Title    ...................................................              ..............................................  Date                                                            Time          22EPIC Rev 08/18

## 2018-09-01 NOTE — ED PROVIDER NOTES
"  History     Chief Complaint:  Abdominal Pain      HPI   Giovanni Carranza is a 23 year old male with a history of Crohn's disease on Remicade and status post remote bowel resection who presents with abdominal pain. The patient states that he woke up at 0100 with severe diffuse abdominal pain. He states that this feels similar to his previous Crohn's flares. The patient does receive Remicade infusions every two months and notes that his last was approximately 1.5 months ago. He has not had any vomiting, fevers, or urinary symptoms. The patient's last bowel movement as this morning and was normal. He has not taken any medications for his pain because he does not have any. The patient notes that he has been followed by [facility] in the past but has not seen them for several years.       Allergies:  Percocet     Medications:    Remicade     Past Medical History:    Crohn's disease    Past Surgical History:    Laparoscopic resection ileocecal  Exploratory laparotomy    Family History:    History reviewed. No pertinent family history.     Social History:  Marital Status: Single  Presents to the ED alone  Works overnights as .  PCP: Vero Schwartz      Review of Systems   Gastrointestinal: Positive for abdominal pain.   All other systems reviewed and are negative.      Physical Exam   First Vitals:  BP: 117/65  Pulse: 63  Temp: 98.3  F (36.8  C)  Resp: 20  Height: 172.7 cm (5' 8\")  Weight: 62.6 kg (138 lb)  SpO2: 99 %      Physical Exam  General: nontoxic appearing male sitting upright in room 15  HENT: mucous membranes moist   CV: regular rate, regular rhythm, no murmur audible  Resp: clear throughout, normal effort, no crackles or wheezing  GI: abdomen soft, mild diffuse tenderness slightly greater on R, no guarding, bowel sounds present, no focal masses palpable  MSK: no bony tenderness, no CVAT  Skin: appropriately warm and dry, healed abdominal scar, no abdominal rash  Neuro: alert, clear speech, " oriented   Psych: calm, cooperative      Emergency Department Course   Imaging:  Abdomen x-ray, 2 views:   No free air. A surgical suture line is seen in the right  lower quadrant of the abdomen. There is a moderate amount of what  appears to be stool in the right colon. There are a few mildly dilated  loops of small bowel in the left midabdomen but no air-fluid levels  are identified.  Preliminary radiology read.     Radiographic findings were communicated with the patient who voiced understanding of the findings.    Laboratory:  CBC:  WBC 5.3, HGB 12.2 (L), , otherwise WNL   CMP: Calcium 8.4 (L), otherwise WNL (Creatinine 0.86)   Lipase: 151  CRP: <2.9    Interventions:  (1636) Normal Saline, 1 liter, IV bolus   (1643) Toradol, 15 mg, IV Injection   2 Norco PO    Tylenol ordered but declined by patient.    Emergency Department Course:  Nursing notes and vitals reviewed.  (1619) I performed an exam of the patient as documented above.    A peripheral IV was established. Blood was drawn from the patient. This was sent for laboratory testing, findings above.    The patient was sent for an abdomen x-ray while in the emergency department, findings above.      The patient passed a PO challenge prior to discharge from the ED.    (1732) I rechecked on the patient. Repeat abdominal exam.   Findings and plan explained to the patient. Patient discharged home with instructions regarding supportive care, medications, and reasons to return. The importance of close follow-up was reviewed.     Impression & Plan    Medical Decision Making:  Given his history, a flare of Crohn's disease was certainly considered though in the absence of fever, leukocytosis, or elevated inflammatory markers I do not think that this episode can be definitively linked to his history of Crohn's.  Alternate causes such as pancreatitis, biliary colic, intra-abdominal infection, kidney stone, and others were considered.  Bowel obstruction was also  "considered, though there is no compelling evidence of such on x-ray.  Clinically, he is not vomiting or worrisome tenderness and he has tolerated an oral challenge, so I think that a bowel obstruction or other immediately surgical or dangerous causes are sufficiently unlikely that a CT is not currently indicated, especially in the interest of avoiding additional radiation exposure in this young man who is already had a number of CTs of his abdomen in his lifetime.  He readily accepts this.  Because of the recurring nature of his discomfort, I withheld parenteral analgesics.  He had not tried any analgesics at home.  I counseled him to contact his GI clinic early this coming week to reestablish care.  He should return here for acute worsening at any hour.  He was discharged to home.      Diagnosis:    ICD-10-CM    1. Abdominal pain, generalized R10.84 Comprehensive metabolic panel     Lipase     CRP inflammation   2. History of Crohn's disease Z87.19    3. History of bowel resection Z92.89        Disposition:  discharged to home    Discharge Medications:  New Prescriptions    OMEPRAZOLE (PRILOSEC) 20 MG CR CAPSULE    Take 1 capsule (20 mg) by mouth daily    ONDANSETRON (ZOFRAN ODT) 4 MG ODT TAB    Take 1 tablet (4 mg) by mouth every 8 hours as needed for nausea       This record was created at least in part using electronic voice recognition software, so please excuse any \"typos.\"      I, Radhika White, am serving as a scribe on 9/1/2018 at 4:19 PM to personally document services performed by Dr. Francois based on my observations and the provider's statements to me.    9/1/2018    EMERGENCY DEPARTMENT       Teddy Francois MD  09/01/18 1846    "

## 2018-10-03 ENCOUNTER — OFFICE VISIT (OUTPATIENT)
Dept: FAMILY MEDICINE | Facility: CLINIC | Age: 23
End: 2018-10-03
Payer: COMMERCIAL

## 2018-10-03 VITALS
HEART RATE: 73 BPM | DIASTOLIC BLOOD PRESSURE: 74 MMHG | OXYGEN SATURATION: 97 % | TEMPERATURE: 97.9 F | BODY MASS INDEX: 21.44 KG/M2 | SYSTOLIC BLOOD PRESSURE: 110 MMHG | WEIGHT: 141.5 LBS | HEIGHT: 68 IN | RESPIRATION RATE: 14 BRPM

## 2018-10-03 DIAGNOSIS — Z86.19 HISTORY OF CHLAMYDIA INFECTION: ICD-10-CM

## 2018-10-03 DIAGNOSIS — Z11.3 SCREEN FOR STD (SEXUALLY TRANSMITTED DISEASE): Primary | ICD-10-CM

## 2018-10-03 PROCEDURE — 87591 N.GONORRHOEAE DNA AMP PROB: CPT | Performed by: FAMILY MEDICINE

## 2018-10-03 PROCEDURE — 99203 OFFICE O/P NEW LOW 30 MIN: CPT | Performed by: FAMILY MEDICINE

## 2018-10-03 PROCEDURE — 87491 CHLMYD TRACH DNA AMP PROBE: CPT | Performed by: FAMILY MEDICINE

## 2018-10-03 RX ORDER — CHOLECALCIFEROL (VITAMIN D3) 1250 MCG
CAPSULE ORAL
Qty: 30 CAPSULE | Refills: 0 | COMMUNITY
Start: 2018-10-03 | End: 2021-09-22

## 2018-10-03 RX ORDER — CHOLECALCIFEROL (VITAMIN D3) 1250 MCG
CAPSULE ORAL
Refills: 0 | COMMUNITY
Start: 2018-03-27 | End: 2018-10-03

## 2018-10-03 NOTE — MR AVS SNAPSHOT
After Visit Summary   10/3/2018    Giovanni Carranza    MRN: [id number]           Patient Information     Date Of Birth          1995        Visit Information        Provider Department      10/3/2018 10:30 AM Elena Phelps DO [facility]        Today's Diagnoses     Screen for STD (sexually transmitted disease)    -  1      Care Instructions      Preventive Health Recommendations  Male Ages 21 - 25     Yearly exam:             See your health care provider every year in order to  o   Review health changes.   o   Discuss preventive care.    o   Review your medicines if your doctor has prescribed any.    You should be tested each year for STDs (sexually transmitted diseases).     Talk to your provider about cholesterol testing.      If you are at risk for diabetes, you should have a diabetes test (fasting glucose).    Shots: Get a flu shot each year. Get a tetanus shot every 10 years.     Nutrition:    Eat at least 5 servings of fruits and vegetables daily.     Eat whole-grain bread, whole-wheat pasta and brown rice instead of white grains and rice.     Get adequate calcium and Vitamin D.     Lifestyle    Exercise for at least 150 minutes a week (30 minutes a day, 5 days a week). This will help you control your weight and prevent disease.     Limit alcohol to one drink per day.     No smoking.     Wear sunscreen to prevent skin cancer.     See your dentist every six months for an exam and cleaning.             Follow-ups after your visit        Follow-up notes from your care team     Return in about 1 year (around 10/3/2019) for Physical Exam.      Who to contact     If you have questions or need follow up information about today's clinic visit or your schedule please contact [facility] directly at [phone].  Normal or non-critical lab and imaging results will be communicated to you by MyChart, letter or phone within 4 business  "days after the clinic has received the results. If you do not hear from us within 7 days, please contact the clinic through [employer] or phone. If you have a critical or abnormal lab result, we will notify you by phone as soon as possible.  Submit refill requests through [employer] or call your pharmacy and they will forward the refill request to us. Please allow 3 business days for your refill to be completed.          Additional Information About Your Visit        Privacy[employer]ctor Information     [employer] lets you send messages to your doctor, view your test results, renew your prescriptions, schedule appointments and more. To sign up, go to [email or web address] . Click on \"Log in\" on the left side of the screen, which will take you to the Welcome page. Then click on \"Sign up Now\" on the right side of the page.     You will be asked to enter the access code listed below, as well as some personal information. Please follow the directions to create your username and password.     Your access code is: [email or web address]  Expires: 2019 10:21 AM     Your access code will  in 90 days. If you need help or a new code, please call your [facility] clinic or [phone].        Care EveryWhere ID     This is your Care EveryWhere ID. This could be used by other organizations to access your [facility] medical records  [id number]        Your Vitals Were     Pulse Temperature Respirations Height Pulse Oximetry BMI (Body Mass Index)    73 97.9  F (36.6  C) (Tympanic) 14 5' 7.5\" (1.715 m) 97% 21.83 kg/m2       Blood Pressure from Last 3 Encounters:   10/03/18 110/74   18 117/65   18 121/76    Weight from Last 3 Encounters:   10/03/18 141 lb 8 oz (64.2 kg)   18 138 lb (62.6 kg)   18 142 lb 6.7 oz (64.6 kg)              We Performed the Following     Chlamydia trachomatis PCR     Neisseria gonorrhoeae PCR          Today's Medication Changes          These changes are accurate as of 10/3/18 11:00 AM.  If you have any " questions, ask your nurse or doctor.               Stop taking these medicines if you haven't already. Please contact your care team if you have questions.     ondansetron 4 MG ODT tab   Commonly known as:  ZOFRAN ODT   Stopped by:  Elena Phelps DO                    Primary Care Provider Office Phone # Fax #    [facility] [phone] [address] [facility] [address]        Equal Access to Services     MIMI DANIEL : Hadii aad ku hadasho Soomaali, waaxda luqadaha, qaybta kaalmada adeegyada, waxay idiin hayaan adeeg kharash la'maría . So [facility] [phone].    ATENCIÓN: Si habla español, tiene a rodriguez disposición servicios gratuitos de asistencia lingüística. Llame al [phone].    We comply with applicable federal civil rights laws and [place] laws. We do not discriminate on the basis of race, color, national origin, age, disability, sex, sexual orientation, or gender identity.            Thank you!     Thank you for choosing [facility]  for your care. Our goal is always to provide you with excellent care. Hearing back from our patients is one way we can continue to improve our services. Please take a few minutes to complete the written survey that you may receive in the mail after your visit with us. Thank you!             Your Updated Medication List - Protect others around you: Learn how to safely use, store and throw away your medicines at www.disposemymeds.org.          This list is accurate as of 10/3/18 11:00 AM.  Always use your most recent med list.                   Brand Name Dispense Instructions for use Diagnosis    DECARA 52951 units capsule   Generic drug:  cholecalciferol     30 capsule    TK 1 C Q WK FOR 12 WEEKS. THEN TAKE 2000 IU DAILY THEREAFTER        REMICADE IV      Every 2 months. (Patient gets infusions, current dose unknown)

## 2018-10-03 NOTE — PROGRESS NOTES
"Physical   Annual:     Getting at least 3 servings of Calcium per day:  NO    Bi-annual eye exam:  NO    Dental care twice a year:  NO    Sleep apnea or symptoms of sleep apnea:  None    Diet:  Regular (no restrictions)    Frequency of exercise:  2-3 days/week    Duration of exercise:  Greater than 60 minutes    Taking medications regularly:  Yes    Medication side effects:  None    Additional concerns today:  No    Review of Systems    Physical Exam     ERROR, this is not a Physical.  Here for STD testing.    SUBJECTIVE:   Giovanni Carranza is a 23 year old male who presents to clinic today for the following health issues:        Requesting STD/HIV testing only      Duration: denies any symptoms    Description (location/character/radiation): na    Intensity:  na    Accompanying signs and symptoms: Denies any symptoms    History (similar episodes/previous evaluation): None    Precipitating or alleviating factors: None    Therapies tried and outcome: None       No concerning symptoms.    Would like to make sure GC/C is negative.  Was positive for Chlamydia August 14. 2018.  Treated with Azithromycin 1g x1.   Had symptoms of \"burning\" urination before he was treated.  That symptoms went away after he was treated.    Problem list and histories reviewed & adjusted, as indicated.  Additional history: as documented    Labs reviewed in EPIC    Reviewed and updated as needed this visit by clinical staff  Tobacco  Allergies  Meds  Problems  Med Hx  Surg Hx  Fam Hx  Soc Hx        Reviewed and updated as needed this visit by Provider  Allergies  Meds  Problems         ROS:  CONSTITUTIONAL: NEGATIVE for fever, chills, change in weight  INTEGUMENTARY/SKIN: NEGATIVE for worrisome rashes, moles or lesions  EYES: NEGATIVE for vision changes or irritation  ENT/MOUTH: NEGATIVE for ear, mouth and throat problems  RESP: NEGATIVE for significant cough or SOB  CV: NEGATIVE for chest pain, palpitations or peripheral edema  GI: " "NEGATIVE for nausea, abdominal pain, heartburn, or change in bowel habits  : NEGATIVE for frequency, dysuria, or hematuria  MUSCULOSKELETAL: NEGATIVE for significant arthralgias or myalgia  NEURO: NEGATIVE for weakness, dizziness or paresthesias  HEME: NEGATIVE for bleeding problems    OBJECTIVE:     /74 (BP Location: Left arm, Patient Position: Sitting, Cuff Size: Adult Regular)  Pulse 73  Temp 97.9  F (36.6  C) (Tympanic)  Resp 14  Ht 5' 7.5\" (1.715 m)  Wt 141 lb 8 oz (64.2 kg)  SpO2 97%  BMI 21.83 kg/m2  Body mass index is 21.83 kg/(m^2).   GENERAL: healthy, alert and no distress  EYES: Eyes grossly normal to inspection, PERRL and conjunctivae and sclerae normal  HENT: ear canals and TM's normal, nose and mouth without ulcers or lesions  NECK: no adenopathy, no asymmetry, masses, or scars and thyroid normal to palpation  RESP: lungs clear to auscultation - no rales, rhonchi or wheezes  CV: regular rate and rhythm, normal S1 S2, no S3 or S4, no murmur, click or rub, no peripheral edema and peripheral pulses strong  ABDOMEN: soft, nontender, no hepatosplenomegaly, no masses and bowel sounds normal   (male): deferred per pt request  MS: no gross musculoskeletal defects noted, no edema  SKIN: no suspicious lesions or rashes  NEURO: Normal strength and tone, mentation intact and speech normal  PSYCH: mentation appears normal, affect normal/bright    Diagnostic Test Results:  STD tests: GC/C  ASSESSMENT/PLAN:     Problem List Items Addressed This Visit     None      Visit Diagnoses     Screen for STD (sexually transmitted disease)    -  Primary    Relevant Orders    Neisseria gonorrhoeae PCR (Completed)    Chlamydia trachomatis PCR (Completed)    History of chlamydia infection        Relevant Orders    Neisseria gonorrhoeae PCR (Completed)    Chlamydia trachomatis PCR (Completed)        Was +chlamydia on August 14, 2018.  Was treated successfully with Azithromycin 1000 mg x1.  Symptoms went " away.  Wants to make sure he does not have GC/C.  Has not been re-exposed to any STDs since he was last tested.   STD testing in progress.  Will inform about results and treat any abnormal results.     Elena Phelps, DO  [facility]

## 2018-10-04 LAB
C TRACH DNA SPEC QL NAA+PROBE: NEGATIVE
N GONORRHOEA DNA SPEC QL NAA+PROBE: NEGATIVE
SPECIMEN SOURCE: NORMAL
SPECIMEN SOURCE: NORMAL

## 2019-03-08 ENCOUNTER — OFFICE VISIT (OUTPATIENT)
Dept: FAMILY MEDICINE | Facility: CLINIC | Age: 24
End: 2019-03-08
Payer: COMMERCIAL

## 2019-03-08 VITALS
TEMPERATURE: 98.7 F | SYSTOLIC BLOOD PRESSURE: 108 MMHG | OXYGEN SATURATION: 98 % | BODY MASS INDEX: 22.68 KG/M2 | WEIGHT: 147 LBS | HEART RATE: 61 BPM | RESPIRATION RATE: 16 BRPM | DIASTOLIC BLOOD PRESSURE: 74 MMHG

## 2019-03-08 DIAGNOSIS — Z11.3 SCREEN FOR STD (SEXUALLY TRANSMITTED DISEASE): ICD-10-CM

## 2019-03-08 DIAGNOSIS — Z86.19 HX OF GONORRHEA: Primary | ICD-10-CM

## 2019-03-08 PROCEDURE — 87491 CHLMYD TRACH DNA AMP PROBE: CPT | Performed by: FAMILY MEDICINE

## 2019-03-08 PROCEDURE — 36415 COLL VENOUS BLD VENIPUNCTURE: CPT | Performed by: FAMILY MEDICINE

## 2019-03-08 PROCEDURE — 87591 N.GONORRHOEAE DNA AMP PROB: CPT | Performed by: FAMILY MEDICINE

## 2019-03-08 PROCEDURE — 99213 OFFICE O/P EST LOW 20 MIN: CPT | Performed by: FAMILY MEDICINE

## 2019-03-08 PROCEDURE — 87389 HIV-1 AG W/HIV-1&-2 AB AG IA: CPT | Performed by: FAMILY MEDICINE

## 2019-03-08 PROCEDURE — 86780 TREPONEMA PALLIDUM: CPT | Performed by: FAMILY MEDICINE

## 2019-03-08 NOTE — PROGRESS NOTES
SUBJECTIVE:   Giovanni Carranza is a 23 year old male who presents to clinic today for the following health issues:      STI Screening      Duration: NA    Description (location/character/radiation): Routine screening, no signs of infection    Intensity:  none    Accompanying signs and symptoms: none    History (similar episodes/previous evaluation): Pt has Hx of positive G[place] [facility]    Precipitating or alleviating factors: None    Therapies tried and outcome: treated with Rocephin 1gm IM and Azithromycin 1 gm orally      Pt has no symptoms, no more recent exposures        Problem list and histories reviewed & adjusted, as indicated.  Additional history: as documented    Labs reviewed in EPIC    Reviewed and updated as needed this visit by clinical staff  Tobacco  Allergies  Meds  Med Hx  Surg Hx  Fam Hx  Soc Hx      Reviewed and updated as needed this visit by Provider         ROS:  CONSTITUTIONAL:NEGATIVE for fever, chills, change in weight  : negative for dysuria, hematuria, decreased urinary stream, erectile dysfunction    OBJECTIVE:                                                    /74 (BP Location: Right arm, Patient Position: Sitting, Cuff Size: Adult Regular)   Pulse 61   Temp 98.7  F (37.1  C) (Temporal)   Resp 16   Wt 66.7 kg (147 lb)   SpO2 98%   BMI 22.68 kg/m    Body mass index is 22.68 kg/m .  GENERAL APPEARANCE: healthy, alert and no distress   (male): exam deferred    Diagnostic test results:  Lab: see below, results pending     ASSESSMENT/PLAN:                                                        ICD-10-CM    1. Hx of gonorrhea Z86.19 Neisseria gonorrhoeae PCR   2. Screen for STD (sexually transmitted disease) Z11.3 Chlamydia trachomatis PCR     HIV Antigen Antibody Combo     Treponema Abs w Reflex to RPR and Titer       Follow up with Provider - as needed  Pt should have 3rd HPV and will be due for Tdap by end of year.   He wants to wait on the HPV vaccine    Patient Instructions   Use protection with intercourse to prevent exposure      Chuck Garcia MD  [facility]

## 2019-03-10 LAB
C TRACH DNA SPEC QL NAA+PROBE: NEGATIVE
N GONORRHOEA DNA SPEC QL NAA+PROBE: NEGATIVE
SPECIMEN SOURCE: NORMAL
SPECIMEN SOURCE: NORMAL
T PALLIDUM AB SER QL: NONREACTIVE

## 2019-03-11 LAB — HIV 1+2 AB+HIV1 P24 AG SERPL QL IA: NONREACTIVE

## 2019-04-03 ENCOUNTER — OFFICE VISIT (OUTPATIENT)
Dept: FAMILY MEDICINE | Facility: CLINIC | Age: 24
End: 2019-04-03
Payer: COMMERCIAL

## 2019-04-03 VITALS
TEMPERATURE: 98 F | HEIGHT: 68 IN | SYSTOLIC BLOOD PRESSURE: 120 MMHG | HEART RATE: 54 BPM | DIASTOLIC BLOOD PRESSURE: 70 MMHG | RESPIRATION RATE: 14 BRPM | WEIGHT: 146 LBS | BODY MASS INDEX: 22.13 KG/M2

## 2019-04-03 DIAGNOSIS — A74.9 CHLAMYDIA INFECTION: ICD-10-CM

## 2019-04-03 DIAGNOSIS — A54.9 GONORRHEA: ICD-10-CM

## 2019-04-03 DIAGNOSIS — Z11.3 SCREENING EXAMINATION FOR VENEREAL DISEASE: Primary | ICD-10-CM

## 2019-04-03 PROCEDURE — 87591 N.GONORRHOEAE DNA AMP PROB: CPT | Performed by: PHYSICIAN ASSISTANT

## 2019-04-03 PROCEDURE — 99213 OFFICE O/P EST LOW 20 MIN: CPT | Performed by: PHYSICIAN ASSISTANT

## 2019-04-03 PROCEDURE — 87491 CHLMYD TRACH DNA AMP PROBE: CPT | Performed by: PHYSICIAN ASSISTANT

## 2019-04-03 PROCEDURE — 87389 HIV-1 AG W/HIV-1&-2 AB AG IA: CPT | Performed by: PHYSICIAN ASSISTANT

## 2019-04-03 PROCEDURE — 36415 COLL VENOUS BLD VENIPUNCTURE: CPT | Performed by: PHYSICIAN ASSISTANT

## 2019-04-03 PROCEDURE — 0064U ANTB TP TOTAL&RPR IA QUAL: CPT | Performed by: PHYSICIAN ASSISTANT

## 2019-04-03 ASSESSMENT — MIFFLIN-ST. JEOR: SCORE: 1628.81

## 2019-04-03 NOTE — PROGRESS NOTES
SUBJECTIVE:   Giovanni Carranza is a 23 year old male who presents to clinic today for the following health issues:    STI Screening      Duration: today    Description (location/character/radiation): no symptoms or concerns    Intensity:  n/a    Accompanying signs and symptoms: none    History (similar episodes/previous evaluation): None    Precipitating or alleviating factors: None    Therapies tried and outcome: uses condoms     Reviewed and updated as needed this visit by clinical staff  Tobacco  Allergies  Meds  Problems  Med Hx  Surg Hx  Fam Hx  Soc Hx        Reviewed and updated as needed this visit by Provider  Tobacco  Allergies  Meds  Problems  Med Hx  Surg Hx  Fam Hx  Soc Hx        Additional complaints: None    HPI additional notes: Giovanni presents today with   Chief Complaint   Patient presents with     STD     screening   No known exposure or symptoms, would just like screening today.       ROS:  C: Negative for fever, chills, recent change in weight  Skin: Negative for worrisome rashes or lesions  ENT: Negative for ear, mouth and throat problems  Resp: Negative for significant cough or SOB  CV: Negative for chest pain or peripheral edema  GI: Negative for nausea, abdominal pain, heartburn, or change in bowel habits  :Negative for frequency, urgency, and dysuria.  MS: Negative for significant arthralgias or myalgias  P: Negative for changes in mood or affect  ROS all other systems negative.    Chart Review:  History   Smoking Status     Never Smoker   Smokeless Tobacco     Never Used     Comment: mom's boyfriend smokes outside       PFSH: Patient is sexually active.       Recent Labs   Lab Test 09/01/18  1637 01/27/18 2005 01/12/18  1000   ALT 30 19 21   CR 0.86 0.91 1.06   GFRESTIMATED >90 >90 88   GFRESTBLACK >90 >90 >90   POTASSIUM 4.2 4.0 4.2      BP Readings from Last 3 Encounters:   04/03/19 120/70   03/08/19 108/74   10/03/18 110/74    Wt Readings from Last 3 Encounters:  "  04/03/19 66.2 kg (146 lb)   03/08/19 66.7 kg (147 lb)   10/03/18 64.2 kg (141 lb 8 oz)                  Patient Active Problem List   Diagnosis     Vision problem     Abdominal pain, unspecified abdominal location     Psoas abscess, right (H)     Crohn's disease of small and large intestines (H)     Mild TBI (H)     Past Surgical History:   Procedure Laterality Date     COLONOSCOPY       COMBINED CYSTOSCOPY, INSERT CATHETER URETER Bilateral 3/23/2015    Procedure: COMBINED CYSTOSCOPY, INSERT CATHETER URETER;  Surgeon: Viridiana Castro MD;  Location:  OR     LAPAROSCOPIC RESECTION ILEOCECAL N/A 3/23/2015    Procedure: LAPAROSCOPIC RESECTION ILEOCECAL;  Surgeon: Marya Leon MD;  Location:  OR     LAPAROTOMY EXPLORATORY N/A 3/25/2015    Procedure: LAPAROTOMY EXPLORATORY;  Surgeon: Marya Leon MD;  Location: [facility] OR     SMALL INTESTINE SURGERY       Problem list, Medication list, Allergies, Medical/Social/Surg hx reviewed in [facility], updated as appropriate.   OBJECTIVE:                                                    /70 (Cuff Size: Adult Regular)   Pulse 54   Temp 98  F (36.7  C) (Tympanic)   Resp 14   Ht 1.715 m (5' 7.5\")   Wt 66.2 kg (146 lb)   BMI 22.53 kg/m    Body mass index is 22.53 kg/m .  GENERAL: healthy, alert, in no acute distress  SKIN: no suspicious lesions, no rashes  PSYCH: Alert and oriented times 3;  Able to articulate logical thoughts. Affect is normal.    Diagnostic test results:  Pending     ASSESSMENT/PLAN:                                                          ICD-10-CM    1. Screening examination for venereal disease Z11.3 Chlamydia trachomatis PCR     Neisseria gonorrhoeae PCR     HIV Antigen Antibody Combo     Treponema Abs w Reflex to RPR and Titer       Please see patient instructions for treatment details.    Return in about 1 week (around 4/10/2019) for Recheck if not improving.    Lashaun Vanegas PA-C  [facility] " [facility]

## 2019-04-03 NOTE — PATIENT INSTRUCTIONS
What are sexually transmitted diseases?  Sexually transmitted diseases (STDs) are infections that pass from one person to another by sexual contact. Sexual contact includes vaginal intercourse, anal intercourse, oral-genital contact, skin-to-skin contact in the genital area, kissing, and the use of sex toys, such as vibrators. The diseases usually affect the genital area, for example, the penis or vagina or surrounding skin. Other areas such as the mouth, throat, and anus can also be affected.  Examples of STDs are:  syphilis   gonorrhea   chlamydia   herpes   human papillomavirus (HPV), which causes genital warts   hepatitis A, B, or C   trichomoniasis   HIV/AIDS.  Key facts about STDs are:  STDs affect men and women of all backgrounds and economic levels. They are most common in people less than 25 years old.   More people are being affected by STDs. Sexually active people today are more likely to have many sex partners during their lives. This puts them at a higher risk for STDs.   STDs may not cause symptoms for years. A person who is infected may not know it and may give the infection to a sex partner.   STDs cause more severe health problems for women than men. For example, they can cause scarring of a woman s fallopian tubes. These are the tubes that normally carry eggs from the ovaries to the uterus. Scarring of the tubes can make it hard for the woman to get pregnant. If she does get pregnant, there is a chance she will have a tubal pregnancy, which can be very dangerous.   STDs can spread from a pregnant mother to her baby and cause serious problems or death. Syphilis, herpes, hepatitis B, and HIV can be especially serious infections for a . Also, some infections may increase the risk for early labor and premature birth.  How do they occur?  Bacteria, viruses, and parasites cause STDs. They are usually passed between partners during sex. You can have an STD without knowing it. This means that you  could infect your partner before you know you have an STD.  What are the symptoms?  burning or pain when urinating   unusual discharge from the vagina or penis   itching, burning, or pain around the vagina, penis, or rectum   rashes, sores, blisters, or growths around the vagina, penis, or rectum   sore throat   vaginal bleeding between menstrual periods.  How are they diagnosed?  The diagnosis may be made from your symptoms, an exam, and possibly lab tests.  How are they treated?  Some STDs can be cured with antibiotic medicine, especially when they are diagnosed and treated early. Some STDs caused by viruses, such as herpes, HIV, and HPV (genital warts), have no cure, but treatment can lessen or avoid complications. If you cannot afford to pay for treatment, most [facility] have an STD clinic or Critical access hospital department where visits are free of charge or cost a very small amount.  How can I take care of myself?  Do not be embarrassed or afraid to seek care or ask for information. STD checks are a part of routine care at most medical offices and clinics. Remember that early diagnosis and treatment can prevent complications and keep you from spreading the disease to your partner. You can get more information and treatment from your healthcare provider, the health department, a family planning clinic, or an STD clinic. Make sure that you carefully follow your provider's treatment plan.  How can I help prevent STDs?  An HPV vaccine is available. It s best to get the HPV shot before a young person has any sexual activity. This is why it is recommended for ages 11 to 12. It is a 3-shot series.   You can get shots that prevent hepatitis A and hepatitis B. Hepatitis B vaccine has been given to newborns in the [place] since the early 1990s. Many people born before then are not protected. The hepatitis B vaccine is recommended for all teens and young adults, age 12 to 24 years, who have not had hepatitis or the hepatitis B  shots. It is also recommended for all unvaccinated adults who are at risk of infection. You may also need the hepatitis A vaccine if you are at risk of infection with the hepatitis A virus.  If you are sexually active, here are some steps to lower your risk of getting infected:  Wait to have sexual relations as long as possible. The younger you are when you start having sex, the more likely it is that you will have an STD.   Have just 1 sexual partner who you know does not have an infection and who is not sexually active with anyone else.   Practice safe sex. Always use latex or polyurethane condoms during any sexual contact. Using condoms reduces the risk of infection for some STDs. However, condoms do not provide full protection against genital warts, syphilis, and herpes. They also do not protect against infections that can be spread with oral-anal sex. Do not reuse condoms.  If you are sexually active, always use a condom and have regular checkups for STDs, especially if you are having sex with a new partner. If you think you might have an STD or may have been exposed to an STD, get checked by your healthcare provider before you have sex again.  You can get more information by calling 7-960-NPR-INFO ([phone]), or visiting the CDC STD Web site at http://www.cdc.gov/std

## 2019-04-04 LAB
HIV 1+2 AB+HIV1 P24 AG SERPL QL IA: NONREACTIVE
T PALLIDUM AB SER QL: NONREACTIVE

## 2019-04-06 LAB
C TRACH DNA SPEC QL NAA+PROBE: POSITIVE
N GONORRHOEA DNA SPEC QL NAA+PROBE: POSITIVE
SPECIMEN SOURCE: ABNORMAL
SPECIMEN SOURCE: ABNORMAL

## 2019-04-08 ENCOUNTER — TELEPHONE (OUTPATIENT)
Dept: FAMILY MEDICINE | Facility: CLINIC | Age: 24
End: 2019-04-08

## 2019-04-08 ENCOUNTER — ALLIED HEALTH/NURSE VISIT (OUTPATIENT)
Dept: NURSING | Facility: CLINIC | Age: 24
End: 2019-04-08
Payer: COMMERCIAL

## 2019-04-08 DIAGNOSIS — A64: Primary | ICD-10-CM

## 2019-04-08 PROCEDURE — 99207 ZZC NO CHARGE LOS: CPT

## 2019-04-08 PROCEDURE — 96372 THER/PROPH/DIAG INJ SC/IM: CPT

## 2019-04-08 RX ORDER — CEFTRIAXONE SODIUM 250 MG
250 VIAL (EA) INJECTION ONCE
Status: COMPLETED | OUTPATIENT
Start: 2019-04-08 | End: 2019-04-08

## 2019-04-08 RX ORDER — AZITHROMYCIN 500 MG/1
1000 TABLET, FILM COATED ORAL ONCE
Qty: 2 TABLET | Refills: 0 | Status: SHIPPED | OUTPATIENT
Start: 2019-04-08 | End: 2019-05-28

## 2019-04-08 RX ADMIN — Medication 250 MG: at 11:33

## 2019-04-08 NOTE — TELEPHONE ENCOUNTER
Notes recorded by Lashaun Vanegas PA-C on 4/8/2019 at 7:17 AM CDT  Please call pt and inform of the following:    Labs were positive for both gonorrhea and chlamydia.  I've sent an oral antibiotic to his pharmacy for him ot take and he needs to come in for a nurse only visit to get a shot of rocephin.  Order already in chart.  Needs to inform partners as well.    Patient Contact    Attempt # 1    Was call answered?  No.  Left message on voicemail with information to call triage back to review lab results.        MD reporting completed and faxed.

## 2019-04-08 NOTE — TELEPHONE ENCOUNTER
Call received from patient. Informed ABX was sent to pharmacy, Nurse only at 11am [facility] to receive Rocephen, and states he will tell his partners he was POS for gonorrhea and chlamydia.

## 2019-04-14 ENCOUNTER — HOSPITAL ENCOUNTER (EMERGENCY)
Facility: CLINIC | Age: 24
Discharge: HOME | End: 2019-04-14
Attending: EMERGENCY MEDICINE | Admitting: EMERGENCY MEDICINE
Payer: COMMERCIAL

## 2019-04-14 VITALS
DIASTOLIC BLOOD PRESSURE: 64 MMHG | HEIGHT: 69 IN | BODY MASS INDEX: 20.44 KG/M2 | OXYGEN SATURATION: 98 % | HEART RATE: 84 BPM | WEIGHT: 138 LBS | SYSTOLIC BLOOD PRESSURE: 124 MMHG | TEMPERATURE: 98.9 F

## 2019-04-14 DIAGNOSIS — S76.312A STRAIN OF LEFT HAMSTRING MUSCLE, INITIAL ENCOUNTER: ICD-10-CM

## 2019-04-14 PROCEDURE — 99282 EMERGENCY DEPT VISIT SF MDM: CPT

## 2019-04-14 ASSESSMENT — MIFFLIN-ST. JEOR: SCORE: 1611.34

## 2019-04-14 ASSESSMENT — ENCOUNTER SYMPTOMS
MYALGIAS: 1
ARTHRALGIAS: 0
WEAKNESS: 0

## 2019-04-14 NOTE — ED AVS SNAPSHOT
Emergency Department  [address] [place] [address]  Phone:  [phone]  Fax:  [phone]                                    Giovanni Carranza   MRN: [id number]    Department:   Emergency Department   Date of Visit:  4/14/2019           After Visit Summary Signature Page    I have received my discharge instructions, and my questions have been answered. I have discussed any challenges I see with this plan with the nurse or doctor.    ..........................................................................................................................................  Patient/Patient Representative Signature      ..........................................................................................................................................  Patient Representative Print Name and Relationship to Patient    ..................................................               ................................................  Date                                   Time    ..........................................................................................................................................  Reviewed by Signature/Title    ...................................................              ..............................................  Date                                               Time          22EPIC Rev 08/18

## 2019-04-15 NOTE — ED PROVIDER NOTES
"  History     Chief Complaint:  Upper left leg pain     HPI   Giovanni Carranza is a 24 year old male with a history of Crohn's disease who presents with upper left leg pain. The patient had sudden onset of left hamstring pain last week while playing football. He says he was just running and it started to hurt. He was unable to continue playing after this. His pain improved over the next week, but he presents to the ED today after sudden increase in pain while playing basketball. He says he noticed the pain increase while initiating a lay up. He denies right leg pain or other injuries. No recent fevers, chills, or recent travel.     Allergies:  Percocet     Medications:    Decara  Remicade    Past Medical History:    Right psoas abscess   Crohn's disease  Mild TBI     Past Surgical History:    Colonoscopy  Cystoscopy, ureteral catheter insertion   Ileocecal resection    Family History:    History reviewed. No pertinent family history.      Social History:  Smoking status: Never  Alcohol use: No  Drug use: No  Patient presents with female  .  PCP: [facility], [facility]    Marital Status:  Single      Review of Systems   Musculoskeletal: Positive for myalgias. Negative for arthralgias.   Neurological: Negative for weakness.   All other systems reviewed and are negative.      Physical Exam     Patient Vitals for the past 24 hrs:   BP Temp Temp src Pulse SpO2 Height Weight   04/14/19 2159 124/64 98.9  F (37.2  C) Oral 84 98 % 1.753 m (5' 9\") 62.6 kg (138 lb)      Physical Exam  Constitutional: Alert, attentive, GCS 15  HENT:    Nose: Nose normal.    Mouth/Throat: Oropharynx is clear, mucous membranes are moist  Eyes: EOM are normal, anicteric, conjugate gaze  CV: Distal extremities warm well perfused   chest: Nonlabored breathing on room air  GI:  non tender. No distension. No guarding or rebound.    MSK: No lower extremity swelling, no left ankle effusion or knee effusion with full passive " and active range of motion of the left leg.  He does have 5 out of 5 hip knee flexion extension however he has significant posterior leg pain with flexion against resistance and has mid hamstring tenderness on the left.  Neurological: Alert, attentive, moving all extremities equally.   Skin: Skin is warm and dry.      Emergency Department Course     Emergency Department Course:  Past medical records, nursing notes, and vitals reviewed.  2210: I performed an exam of the patient and obtained history, as documented above.    2218: I rechecked the patient. Findings and plan explained to the Patient. Patient discharged home with instructions regarding supportive care, medications, and reasons to return. The importance of close follow-up was reviewed.       Impression & Plan    Medical Decision Makin-year-old male with past medical history significant for Crohn's on Remicade presenting for evaluation of left posterior upper leg pain after sustaining an injury while playing football 6 days prior that was reaggravated today while playing basketball.  Do not suspect fracture, imaging deferred his exam is consistent with likely hamstring strain or partial tear.  He does not have any inguinal pain or swelling to suggest DVT ultrasound deferred.  I recommended conservative management Tylenol, ibuprofen heat pack and follow-up sports med clinic for recheck.  Recommended rest and gradual return to activity likely over the next 4-6 weeks.  Patient expressed understanding this plan and was discharged home.    Diagnosis:    ICD-10-CM    1. Strain of left hamstring muscle, initial encounter S76.312A      Disposition:  Discharged.     Discharge Medications:  None       Darrel Guerrero MD   [employer]  12:35 AM 04/15/19     Anurag Roach  2019    EMERGENCY DEPARTMENT    Scribe Disclosure:  Anurag URRTUIA Chi, am serving as a scribe at 10:10 PM on 2019 to document services personally  performed by Darrel Guerrero MD based on my observations and the provider's statements to me.        Darrel Guerrero MD  04/15/19 0036

## 2019-05-24 ENCOUNTER — APPOINTMENT (OUTPATIENT)
Dept: CT IMAGING | Facility: CLINIC | Age: 24
End: 2019-05-24
Attending: EMERGENCY MEDICINE
Payer: COMMERCIAL

## 2019-05-24 ENCOUNTER — HOSPITAL ENCOUNTER (EMERGENCY)
Facility: CLINIC | Age: 24
Discharge: HOME | End: 2019-05-24
Attending: EMERGENCY MEDICINE | Admitting: EMERGENCY MEDICINE
Payer: COMMERCIAL

## 2019-05-24 VITALS
TEMPERATURE: 98 F | RESPIRATION RATE: 16 BRPM | BODY MASS INDEX: 21.22 KG/M2 | OXYGEN SATURATION: 99 % | HEART RATE: 57 BPM | SYSTOLIC BLOOD PRESSURE: 124 MMHG | WEIGHT: 140 LBS | DIASTOLIC BLOOD PRESSURE: 76 MMHG | HEIGHT: 68 IN

## 2019-05-24 DIAGNOSIS — R10.32 ABDOMINAL PAIN, LEFT LOWER QUADRANT: ICD-10-CM

## 2019-05-24 LAB
ALBUMIN SERPL-MCNC: 3.7 G/DL (ref 3.4–5)
ALP SERPL-CCNC: 70 U/L (ref 40–150)
ALT SERPL W P-5'-P-CCNC: 27 U/L (ref 0–70)
ANION GAP SERPL CALCULATED.3IONS-SCNC: 2 MMOL/L (ref 3–14)
AST SERPL W P-5'-P-CCNC: 24 U/L (ref 0–45)
BASOPHILS # BLD AUTO: 0 10E9/L (ref 0–0.2)
BASOPHILS NFR BLD AUTO: 0.5 %
BILIRUB SERPL-MCNC: 0.5 MG/DL (ref 0.2–1.3)
BUN SERPL-MCNC: 13 MG/DL (ref 7–30)
CALCIUM SERPL-MCNC: 8.7 MG/DL (ref 8.5–10.1)
CHLORIDE SERPL-SCNC: 107 MMOL/L (ref 94–109)
CO2 SERPL-SCNC: 30 MMOL/L (ref 20–32)
CREAT SERPL-MCNC: 1 MG/DL (ref 0.66–1.25)
DIFFERENTIAL METHOD BLD: ABNORMAL
EOSINOPHIL # BLD AUTO: 0.2 10E9/L (ref 0–0.7)
EOSINOPHIL NFR BLD AUTO: 2.3 %
ERYTHROCYTE [DISTWIDTH] IN BLOOD BY AUTOMATED COUNT: 15.2 % (ref 10–15)
GFR SERPL CREATININE-BSD FRML MDRD: >90 ML/MIN/{1.73_M2}
GLUCOSE SERPL-MCNC: 85 MG/DL (ref 70–99)
HCT VFR BLD AUTO: 37.6 % (ref 40–53)
HGB BLD-MCNC: 11.4 G/DL (ref 13.3–17.7)
IMM GRANULOCYTES # BLD: 0 10E9/L (ref 0–0.4)
IMM GRANULOCYTES NFR BLD: 0.2 %
LIPASE SERPL-CCNC: 136 U/L (ref 73–393)
LYMPHOCYTES # BLD AUTO: 2.8 10E9/L (ref 0.8–5.3)
LYMPHOCYTES NFR BLD AUTO: 43.3 %
MCH RBC QN AUTO: 23 PG (ref 26.5–33)
MCHC RBC AUTO-ENTMCNC: 30.3 G/DL (ref 31.5–36.5)
MCV RBC AUTO: 76 FL (ref 78–100)
MONOCYTES # BLD AUTO: 0.5 10E9/L (ref 0–1.3)
MONOCYTES NFR BLD AUTO: 7.5 %
NEUTROPHILS # BLD AUTO: 3 10E9/L (ref 1.6–8.3)
NEUTROPHILS NFR BLD AUTO: 46.2 %
NRBC # BLD AUTO: 0 10*3/UL
NRBC BLD AUTO-RTO: 0 /100
PLATELET # BLD AUTO: 287 10E9/L (ref 150–450)
POTASSIUM SERPL-SCNC: 4.3 MMOL/L (ref 3.4–5.3)
PROT SERPL-MCNC: 8.1 G/DL (ref 6.8–8.8)
RBC # BLD AUTO: 4.95 10E12/L (ref 4.4–5.9)
SODIUM SERPL-SCNC: 139 MMOL/L (ref 133–144)
WBC # BLD AUTO: 6.5 10E9/L (ref 4–11)

## 2019-05-24 PROCEDURE — 85025 COMPLETE CBC W/AUTO DIFF WBC: CPT | Performed by: EMERGENCY MEDICINE

## 2019-05-24 PROCEDURE — 74177 CT ABD & PELVIS W/CONTRAST: CPT

## 2019-05-24 PROCEDURE — 99284 EMERGENCY DEPT VISIT MOD MDM: CPT | Mod: 25

## 2019-05-24 PROCEDURE — 99285 EMERGENCY DEPT VISIT HI MDM: CPT | Mod: 25

## 2019-05-24 PROCEDURE — 80053 COMPREHEN METABOLIC PANEL: CPT | Performed by: EMERGENCY MEDICINE

## 2019-05-24 PROCEDURE — 25000125 ZZHC RX 250: Performed by: EMERGENCY MEDICINE

## 2019-05-24 PROCEDURE — 83690 ASSAY OF LIPASE: CPT | Performed by: EMERGENCY MEDICINE

## 2019-05-24 PROCEDURE — 25000128 H RX IP 250 OP 636: Performed by: EMERGENCY MEDICINE

## 2019-05-24 RX ORDER — IOPAMIDOL 755 MG/ML
71 INJECTION, SOLUTION INTRAVASCULAR ONCE
Status: COMPLETED | OUTPATIENT
Start: 2019-05-24 | End: 2019-05-24

## 2019-05-24 RX ADMIN — IOPAMIDOL 71 ML: 755 INJECTION, SOLUTION INTRAVENOUS at 16:01

## 2019-05-24 RX ADMIN — SODIUM CHLORIDE 61 ML: 9 INJECTION, SOLUTION INTRAVENOUS at 16:01

## 2019-05-24 ASSESSMENT — ENCOUNTER SYMPTOMS
DYSURIA: 0
HEMATURIA: 0
FEVER: 0
COUGH: 0
ABDOMINAL PAIN: 1
BLOOD IN STOOL: 0
DIARRHEA: 0

## 2019-05-24 ASSESSMENT — MIFFLIN-ST. JEOR: SCORE: 1604.54

## 2019-05-24 NOTE — LETTER
May 24, 2019      To Whom It May Concern:      Giovanni ALAS Dillon was seen in our Emergency Department today, 05/24/19.  He may return to work/school when improved.    Sincerely,        Danielle Suazo MD

## 2019-05-24 NOTE — ED AVS SNAPSHOT
Emergency Department  [address] [place] [address]  Phone:  [phone]  Fax:  [phone]                                    Giovanni Carranza   MRN: [id number]    Department:   Emergency Department   Date of Visit:  5/24/2019           After Visit Summary Signature Page    I have received my discharge instructions, and my questions have been answered. I have discussed any challenges I see with this plan with the nurse or doctor.    ..........................................................................................................................................  Patient/Patient Representative Signature      ..........................................................................................................................................  Patient Representative Print Name and Relationship to Patient    ..................................................               ................................................  Date                                   Time    ..........................................................................................................................................  Reviewed by Signature/Title    ...................................................              ..............................................  Date                                               Time          22EPIC Rev 08/18

## 2019-05-24 NOTE — CONSULTS
[facility]  Gastroenterology Consultation    Giovanni Carranza  [address]  [facility] [address]  24 year old male    Admission Date/Time: 5/24/2019  Primary Care Provider: Ramakrishna, [facility]    We were asked to see the patient in consultation by ED for evaluation of Crohn's.        HPI:  Giovanni Carranza is a 24 year old male who has a history of ileal Crohn's disease diagnosed in 2014 s/p previous right hemicolectomy with ileal resection in March 2015.  The patient is currently maintained on Remicade monotherapy.  He was receiving 5 mg/kg every 8 weeks.  His last dose was 2/28/2019.     He presents today with abdominal pain mostly on the left side of his abdomen that began this am upon awakening.  He reports that the pain was so severe that he had difficulty walking.  He reports normal bowel movements with no hematochezia or melena.  He denies nausea, vomiting.  He has had no fever or chills.  He denies joint pain or rashes.  Appetite and weight have been stable.  His pain is now resolved.      Last colonoscopy November 2015.  No active disease seen in the neoterminal ileum.  The remainder of the colon was unremarkable.      The patient had previously had a brief trial of methotrexate for 4-5 months, however, this was discontinued due to elevated liver function tests.    He has struggled with severe vitamin D deficiency, as well as, iron deficiency and has received iron infusion in the past.    Blood work in the ED largely unremarkable.  Hemoglobin 11.4 with low MCV at 76.  Abdominal x-ray shows no evidence of free air.  There is a moderate amount of stool in the right colon.  He has a few mildly dilated loops of small bowel in the left mid abdomen but no air-fluid levels are present.    ROS: A comprehensive ten point review of systems was negative aside from those in mentioned in the HPI.      MEDICATIONS:   No current facility-administered medications on file  "prior to encounter.   Current Outpatient Medications on File Prior to Encounter:  [] azithromycin (ZITHROMAX) 500 MG tablet Take 2 tablets (1,000 mg) by mouth once for 1 dose   DECARA 15159 units capsule TK 1 C Q WK FOR 12 WEEKS. THEN TAKE 2000 IU DAILY THEREAFTER   InFLIXimab (REMICADE IV) Every 2 months. (Patient gets infusions, current dose unknown)       ALLERGIES:   Allergies   Allergen Reactions     Percocet [Oxycodone-Acetaminophen] Other (See Comments)     \"feels funny\"       Past Medical History:   Diagnosis Date     Crohn's disease (H)        Past Surgical History:   Procedure Laterality Date     COLONOSCOPY       COMBINED CYSTOSCOPY, INSERT CATHETER URETER Bilateral 3/23/2015    Procedure: COMBINED CYSTOSCOPY, INSERT CATHETER URETER;  Surgeon: Viridiana Castro MD;  Location: [facility] OR     LAPAROSCOPIC RESECTION ILEOCECAL N/A 3/23/2015    Procedure: LAPAROSCOPIC RESECTION ILEOCECAL;  Surgeon: Marya Leon MD;  Location: [facility] OR     LAPAROTOMY EXPLORATORY N/A 3/25/2015    Procedure: LAPAROTOMY EXPLORATORY;  Surgeon: Marya Leon MD;  Location: [facility] OR     SMALL INTESTINE SURGERY           SOCIAL HISTORY:  Social History     Tobacco Use     Smoking status: Never Smoker     Smokeless tobacco: Never Used     Tobacco comment: mom's boyfriend smokes outside   Substance Use Topics     Alcohol use: No     Drug use: No       FAMILY HISTORY:  Family History   Problem Relation Age of Onset     No Known Problems Mother      Unknown/Adopted Father        PHYSICAL EXAM:   /77   Pulse 93   Temp 98  F (36.7  C) (Oral)   Resp 16   Ht 1.727 m (5' 8\")   Wt 63.5 kg (140 lb)   SpO2 97%   BMI 21.29 kg/m      Constitutional: NAD, comfortable  Cardiovascular: RRR, normal S1 and S2, no r/c/g/m  Respiratory: CTAB  Psychiatric: mentation appears normal and affect normal  Head: Normocephalic. Atraumatic.    Neck: Neck supple. No adenopathy. Thyroid symmetric, normal size, trachea midline  Eyes:  " PERRL, no icterus  ENT: Hearing adequate, pharynx normal without erythema or exudate  Abdomen:   Auscultation: + BS  Appearance: non-distended  Palpation: non-tender  NEURO: grossly negative  SKIN: no suspicious lesions or rashes  LYMPH:   anterior cervical: no adenopathy  posterior cervical: no adenopathy  supraclavicular: no adenopathy          ADDITIONAL COMMENTS:   I reviewed the patient's new clinical lab test results.   Recent Labs   Lab Test 05/24/19 1409 09/01/18  1637 01/27/18  2005  03/25/15  0700  03/24/15  1705  03/12/15  1405   WBC 6.5 5.3 7.1   < > 15.8*   < >  --    < >  --    HGB 11.4* 12.2* 10.8*   < > 10.9*   < >  --    < >  --    MCV 76* 78 76*   < > 84   < >  --    < >  --     261 316   < > 153   < >  --    < >  --    INR  --   --   --   --  1.12  --  1.16*  --  0.99    < > = values in this interval not displayed.     Recent Labs   Lab Test 05/24/19 1409 09/01/18 1637 01/27/18 2005    140 139   POTASSIUM 4.3 4.2 4.0   CHLORIDE 107 108 104   CO2 30 26 29   BUN 13 10 16   CR 1.00 0.86 0.91   ANIONGAP 2* 6 6   JOSE MARIA 8.7 8.4* 8.7   GLC 85 85 84     Recent Labs   Lab Test 05/24/19 1409 09/01/18  1637 01/27/18  2020 01/27/18  2005  03/11/15  1442  02/26/15  1342   ALBUMIN 3.7 3.9  --  3.8   < >  --    < >  --    BILITOTAL 0.5 0.4  --  0.2   < >  --    < >  --    ALT 27 30  --  19   < >  --    < >  --    AST 24 24  --  18   < >  --    < >  --    ALKPHOS 70 74  --  62   < >  --    < >  --    PROTEIN  --   --  Negative  --   --  Negative  --  Negative   LIPASE 136 151  --  207   < >  --   --   --     < > = values in this interval not displayed.             .    CONSULTATION ASSESSMENT AND PLAN:      25 yo male with history of ileal Crohn's disease diagnosed in 2014 and s/p ileocolonic resection in 2015 who has most recently been maintained on Remicade monotherapy.  Patient now presents with abdominal pain.  He, of note, is overdue for his Remicade which was last given 2/28/2019.  Dilated  loops of small bowel on x-ray.      -  Recommend further imaging to exclude inflammation/obstruction.  MR enterography would be preferable.  If unremarkable, patient ok for discharge.  Will need follow up in clinic with Dr. Alarcon.  Our office will call to arrange.    I discussed the patient's findings and plan with Dr. Alarcon.      Natty Christian, PAC  [facility]  Office:  [phone] call if needed after 5PM  Cell:  [phone], not available after 5PM at this number

## 2019-05-24 NOTE — ED PROVIDER NOTES
"  History     Chief Complaint:  Abdominal Pain    HPI   Giovanni Carranza is a 24 year old male with a history of Crohn's disease who presents with abdominal pain. The patient reports that earlier today he developed left lower quadrant pain which is sharp in nature. He states this is consistent with his typical Crohn's flare. He was diagnosed with Crohn's at age 19 and has no family history of this. He notes that he received Remicade infusions every 2 months, but has not gotten an infusion in 3 months. His last infusion was at the [facility] [place] location. He states he used to follow up with Dr. Alarcon at [facility], but has not seen in for quite some time. He reports his last bowel movement was yesterday and was normal. He denies any recent bloody stools or diarrhea. He also denies having any recent fevers or cough. He denies having any dysuria or hematuria.    Allergies:  Percocet    Medications:    Remicade     Past Medical History:    Crohn's disease  Mild traumatic brain injury  Psoas abscess    Past Surgical History:    Combined cystoscopy, insert catheter ureter  Laparoscopic resection ileocecal  Laparotomy exploratory  Small intestine surgery    Family History:    History reviewed. No pertinent family history.    Social History:  The patient presents to the ED alone.  Marital status: Single  Smoking status: Never Smoker  Alcohol use: No  Drug use: No    Review of Systems   Constitutional: Negative for fever.   Respiratory: Negative for cough.    Gastrointestinal: Positive for abdominal pain. Negative for blood in stool and diarrhea.   Genitourinary: Negative for dysuria and hematuria.   All other systems reviewed and are negative.    Physical Exam     Patient Vitals for the past 24 hrs:   BP Temp Temp src Pulse Resp SpO2 Height Weight   05/24/19 1515 124/76 -- -- 57 -- 99 % -- --   05/24/19 1340 -- -- -- 93 -- -- -- --   05/24/19 1337 124/77 98  F (36.7  C) Oral -- 16 97 % 1.727 m (5' 8\") " 63.5 kg (140 lb)        Physical Exam    Physical Exam   Constitutional:  Patient is oriented to person, place, and time. They appear well-developed and well-nourished. Mild distress secondary to abdominal discomfort.   HENT:   Mouth/Throat:   Oropharynx is clear and moist.   Eyes:    Conjunctivae normal and EOM are normal. Pupils are equal, round, and reactive to light.   Neck:    Normal range of motion.   Cardiovascular: Normal rate, regular rhythm and normal heart sounds.  Exam reveals no gallop and no friction rub.  No murmur heard.  Pulmonary/Chest:  Effort normal and breath sounds normal. Patient has no wheezes. Patient has no rales.   Abdominal:   Soft. Bowel sounds are normal. Mild left-sided pain to palpation. Patient is thin. Patient exhibits no mass. There is no rebound and no guarding.   Musculoskeletal:  Normal range of motion. Patient exhibits no edema.   Neurological:   Patient is alert and oriented to person, place, and time. Patient has normal strength. No cranial nerve deficit or sensory deficit. GCS 15  Skin:   Skin is warm and dry. No rash noted. No erythema.   Psychiatric:   Patient has a normal mood and affect. Patient's behavior is normal. Judgment and thought content normal.      Emergency Department Course     Imaging:  Radiographic findings were communicated with the patient who voiced understanding of the findings.    CT ABDOMEN PELVIS W CONTRAST:  IMPRESSION:  1. Circumferential wall thickening of the gastric antrum with the  stomach distended proximally. This may be active Crohn's disease  versus gastritis or ulcer disease. No perforation.  2. No other acute abnormality. No bowel obstruction or other bowel  inflammation.  As read by Radiology.    Laboratory:  CBC: HGB 11.4 (L), o/w WNL (WBC 6.5, )  CMP: Anion Gap 2 (L), o/w WNL (Creatinine 1.00)  Lipase: 136    Emergency Department Course:  Past medical records, nursing notes, and vitals reviewed.  1401: I performed an exam of the  patient and obtained history, as documented above.   IV inserted and blood samples were collected and sent for laboratory testing, findings above.    1511: I consulted with KATELYNN Sandhu of [facility].     1534: I consulted with Dr. Alarcon of [facility]. After assessing the patient, Dr. Alarcon recommends obtaining a CT of the patient's abdomen.     1537: I rechecked the patient and explained the findings of his blood work. We discussed obtaining CT imaging and the patient is agreeable to this.  The patient was sent for a CT of the abdomen and pelvis while in the emergency department, findings above.      1632: I rechecked the patient and explained the findings.    Findings and plan explained to the patient. Patient discharged home with instructions regarding supportive care and reasons to return. The importance of close follow-up was reviewed.    Impression & Plan      Medical Decision Making:  Giovanni Carranza is a 24 year old male with a history of Crohn's coming in with what he feels is a Crohn's attack. Basic blood work was obtained which is unremarkable. I discussed with him CAT scan versus no CAT scan. Given he has had CAT scans many times before and he is so young and felt this was a typical flare up or him, I did not initially write for a CAT scan. GI came down to see him in the Emergency Department and they did recommend a CAT scan as they were concerned for possible obstruction. I then did do a CAT scan which shows that he has a stomach full of food. He did eat earlier on in the day, but he denies any vomiting or epigastric pain. In any case, there is no evidence of an active Crohn's flare. No other bowel obstruction. So at this point he is safe to be discharged. He was instructed to follow up with Dr. Alarcon in the next week to get his Remicade treatment. Patient feels comfortable with this plan.    Diagnosis:    ICD-10-CM   1. Abdominal pain, left lower quadrant R10.32        Disposition:  Discharged to home      Sherri Guillermo  5/24/2019    EMERGENCY DEPARTMENT  I, Sherri Guillermo, am serving as a scribe at 2:01 PM on 5/24/2019 to document services personally performed by Danielle Suazo MD based on my observations and the provider's statements to me.        Danielle Suazo MD  05/30/19 0556

## 2019-08-26 VITALS
OXYGEN SATURATION: 98 % | HEART RATE: 57 BPM | SYSTOLIC BLOOD PRESSURE: 117 MMHG | HEIGHT: 68 IN | BODY MASS INDEX: 21.22 KG/M2 | DIASTOLIC BLOOD PRESSURE: 45 MMHG | RESPIRATION RATE: 16 BRPM | TEMPERATURE: 98 F | WEIGHT: 140 LBS

## 2019-08-26 PROCEDURE — 99282 EMERGENCY DEPT VISIT SF MDM: CPT

## 2019-08-26 ASSESSMENT — MIFFLIN-ST. JEOR: SCORE: 1604.54

## 2019-08-27 ENCOUNTER — HOSPITAL ENCOUNTER (EMERGENCY)
Facility: CLINIC | Age: 24
Discharge: HOME | End: 2019-08-27
Attending: EMERGENCY MEDICINE | Admitting: EMERGENCY MEDICINE
Payer: MEDICAID

## 2019-08-27 DIAGNOSIS — R21 RASH: ICD-10-CM

## 2019-08-27 RX ORDER — HYDROXYZINE HYDROCHLORIDE 25 MG/1
25-50 TABLET, FILM COATED ORAL 3 TIMES DAILY PRN
Qty: 20 TABLET | Refills: 0 | Status: SHIPPED | OUTPATIENT
Start: 2019-08-27 | End: 2019-08-27

## 2019-08-27 RX ORDER — HYDROXYZINE HYDROCHLORIDE 25 MG/1
25-50 TABLET, FILM COATED ORAL 3 TIMES DAILY PRN
Qty: 20 TABLET | Refills: 0 | Status: SHIPPED | OUTPATIENT
Start: 2019-08-27 | End: 2021-09-22

## 2019-08-27 RX ORDER — PERMETHRIN 50 MG/G
CREAM TOPICAL ONCE
Qty: 30 G | Refills: 1 | Status: SHIPPED | OUTPATIENT
Start: 2019-08-27 | End: 2019-08-27

## 2019-08-27 ASSESSMENT — ENCOUNTER SYMPTOMS: ROS SKIN COMMENTS: + PRURITUS

## 2019-08-27 NOTE — ED PROVIDER NOTES
"  History     Chief Complaint:  Pruritus      The history is provided by the patient.      Giovanni Carranza is a 24 year old male, with history notable for Crohn's on Remicade infusions, who presents with concerns for several days of back as well as bilateral upper and lower extremity pruritus. He asserts this has been present since exposure to an individual with scabies. He denies any rash.  No fever.    Allergies:  Percocet      Medications:    Infliximab     Past Medical History:    Crohn's disease     Past Surgical History:    Cystoscopy, insert catheter ureter   Resection ileocecal   Laparotomy exploration   Small intestine surgery     Family History:    No past pertinent family history.    Social History:  Negative for tobacco use.  Negative for alcohol use.  Marital Status: Single     Review of Systems   Skin: Negative for rash.        + pruritus   All other systems reviewed and are negative.    Physical Exam     Patient Vitals for the past 24 hrs:   BP Temp Temp src Pulse Resp SpO2 Height Weight   08/26/19 2303 117/45 98  F (36.7  C) Oral 57 16 98 % 1.727 m (5' 8\") 63.5 kg (140 lb)     Physical Exam    General: Resting comfortably on the gurney  Eyes:  The pupils are equal and round    Conjunctivae and sclerae are normal  ENT:    No oral lesions  Neck:  Normal range of motion  CV:  Bradycardic rate and rhythm    Skin warm and well perfused   Resp:  Non labored breathing on room air  MS:  Normal muscular tone  Skin:  No rash  Neuro:   Awake, alert.      Speech is normal and fluent.    Face is symmetric.     Moves all extremities equally  Psych: Normal affect.  Appropriate interactions.    Emergency Department Course     Emergency Department Course:  Nursing notes and vitals reviewed.  0117 I performed an exam of the patient as documented above. I personally answered all related questions prior to discharge, anticipatory guidance given.    Impression & Plan      Medical Decision Making:  Giovanni Carranza is " a 24 year old male who presents to the emergency department today for evaluation of pruritis. Concerned about scabies and thinks he has scabies. Would like to be treated for this. There does not seem to be a new rash on his body but he reports itching. Will treat presumptively for scabies given his recent exposure. Hydroxyzine for itching as needed.     Diagnosis:    ICD-10-CM   1. Rash R21     Disposition: Home    Discharge Medications:  hydrOXYzine 25 MG tablet  Commonly known as:  ATARAX      Dose:  25-50 mg  Take 1-2 tablets (25-50 mg) by mouth 3 times daily as needed for itching  Quantity:  20 tablet  Refills:  0     permethrin 5 % external cream  Commonly known as:  ELIMITE      Apply topically once for 1 dose Massage into skin from head to foot.  Leave on for 8-14hrs and then wash off.  May repeat in 2 wks if needed.  Quantity:  30 g  Refills:  1       Scribe Disclosure:  Maciel URRUTIA, am serving as a scribe at 12:40 AM on 8/27/2019 to document services personally performed by Nicole Kumar MD based on my observations and the provider's statements to me.     EMERGENCY DEPARTMENT       Nicole Kumar MD  08/27/19 0438

## 2019-08-27 NOTE — ED AVS SNAPSHOT
Emergency Department  [address] [place] [address]  Phone:  [phone]  Fax:  [phone]                                    Giovanni Carranza   MRN: [id number]    Department:   Emergency Department   Date of Visit:  8/26/2019           After Visit Summary Signature Page    I have received my discharge instructions, and my questions have been answered. I have discussed any challenges I see with this plan with the nurse or doctor.    ..........................................................................................................................................  Patient/Patient Representative Signature      ..........................................................................................................................................  Patient Representative Print Name and Relationship to Patient    ..................................................               ................................................  Date                                   Time    ..........................................................................................................................................  Reviewed by Signature/Title    ...................................................              ..............................................  Date                                               Time          22EPIC Rev 08/18

## 2019-10-17 ENCOUNTER — HOSPITAL ENCOUNTER (EMERGENCY)
Facility: CLINIC | Age: 24
Discharge: HOME | End: 2019-10-17
Payer: COMMERCIAL

## 2019-10-17 VITALS
TEMPERATURE: 98.8 F | RESPIRATION RATE: 16 BRPM | OXYGEN SATURATION: 100 % | HEART RATE: 71 BPM | SYSTOLIC BLOOD PRESSURE: 127 MMHG | BODY MASS INDEX: 21.92 KG/M2 | DIASTOLIC BLOOD PRESSURE: 68 MMHG | HEIGHT: 68 IN | WEIGHT: 144.6 LBS

## 2019-10-17 ASSESSMENT — MIFFLIN-ST. JEOR: SCORE: 1625.4

## 2019-10-18 NOTE — ED TRIAGE NOTES
Pt reports HA for the past 3 days. Denies hx of migraines. Pt denies any other sx associated with the HA and has not taken any medications at home for the pain

## 2019-10-24 ENCOUNTER — HOSPITAL ENCOUNTER (EMERGENCY)
Facility: CLINIC | Age: 24
Discharge: HOME | End: 2019-10-25
Attending: EMERGENCY MEDICINE | Admitting: EMERGENCY MEDICINE
Payer: COMMERCIAL

## 2019-10-24 DIAGNOSIS — R51.9 ACUTE NONINTRACTABLE HEADACHE, UNSPECIFIED HEADACHE TYPE: ICD-10-CM

## 2019-10-24 PROCEDURE — 96375 TX/PRO/DX INJ NEW DRUG ADDON: CPT

## 2019-10-24 PROCEDURE — 99284 EMERGENCY DEPT VISIT MOD MDM: CPT | Mod: 25

## 2019-10-24 PROCEDURE — 25000128 H RX IP 250 OP 636: Performed by: EMERGENCY MEDICINE

## 2019-10-24 PROCEDURE — 96361 HYDRATE IV INFUSION ADD-ON: CPT

## 2019-10-24 PROCEDURE — 96374 THER/PROPH/DIAG INJ IV PUSH: CPT

## 2019-10-24 RX ORDER — METOCLOPRAMIDE HYDROCHLORIDE 5 MG/ML
10 INJECTION INTRAMUSCULAR; INTRAVENOUS ONCE
Status: COMPLETED | OUTPATIENT
Start: 2019-10-24 | End: 2019-10-25

## 2019-10-24 RX ORDER — KETOROLAC TROMETHAMINE 15 MG/ML
15 INJECTION, SOLUTION INTRAMUSCULAR; INTRAVENOUS ONCE
Status: COMPLETED | OUTPATIENT
Start: 2019-10-24 | End: 2019-10-24

## 2019-10-24 RX ADMIN — KETOROLAC TROMETHAMINE 15 MG: 15 INJECTION, SOLUTION INTRAMUSCULAR; INTRAVENOUS at 23:43

## 2019-10-24 RX ADMIN — SODIUM CHLORIDE 1000 ML: 9 INJECTION, SOLUTION INTRAVENOUS at 23:37

## 2019-10-24 RX ADMIN — METOCLOPRAMIDE 10 MG: 5 INJECTION, SOLUTION INTRAMUSCULAR; INTRAVENOUS at 23:43

## 2019-10-24 ASSESSMENT — ENCOUNTER SYMPTOMS
BLOOD IN STOOL: 0
NUMBNESS: 0
CHILLS: 0
VOMITING: 0
NECK PAIN: 0
HEADACHES: 1
DIARRHEA: 0
NAUSEA: 0
FEVER: 0
PHOTOPHOBIA: 1

## 2019-10-24 ASSESSMENT — MIFFLIN-ST. JEOR: SCORE: 1633.5

## 2019-10-24 NOTE — ED AVS SNAPSHOT
Emergency Department  [address] [place] [address]  Phone:  [phone]  Fax:  [phone]                                    Giovanni Carranza   MRN: [id number]    Department:   Emergency Department   Date of Visit:  10/24/2019           After Visit Summary Signature Page    I have received my discharge instructions, and my questions have been answered. I have discussed any challenges I see with this plan with the nurse or doctor.    ..........................................................................................................................................  Patient/Patient Representative Signature      ..........................................................................................................................................  Patient Representative Print Name and Relationship to Patient    ..................................................               ................................................  Date                                   Time    ..........................................................................................................................................  Reviewed by Signature/Title    ...................................................              ..............................................  Date                                               Time          22EPIC Rev 08/18

## 2019-10-25 VITALS
HEIGHT: 68 IN | SYSTOLIC BLOOD PRESSURE: 116 MMHG | BODY MASS INDEX: 22.19 KG/M2 | RESPIRATION RATE: 18 BRPM | DIASTOLIC BLOOD PRESSURE: 69 MMHG | HEART RATE: 74 BPM | TEMPERATURE: 99.3 F | OXYGEN SATURATION: 96 % | WEIGHT: 146.39 LBS

## 2019-10-25 PROCEDURE — 25000128 H RX IP 250 OP 636: Performed by: EMERGENCY MEDICINE

## 2019-10-25 RX ORDER — DEXAMETHASONE SODIUM PHOSPHATE 10 MG/ML
10 INJECTION, SOLUTION INTRAMUSCULAR; INTRAVENOUS ONCE
Status: COMPLETED | OUTPATIENT
Start: 2019-10-25 | End: 2019-10-25

## 2019-10-25 RX ADMIN — DEXAMETHASONE SODIUM PHOSPHATE 10 MG: 10 INJECTION, SOLUTION INTRAMUSCULAR; INTRAVENOUS at 00:34

## 2019-10-25 NOTE — ED PROVIDER NOTES
"  History     Chief Complaint:  Headache     HPI   Giovanni Carranza is a 24 year old male with a history of Crohn's, who presents with a bilaterally located headache for the past week. The patient reports that his headache has been constant since onset, though waxes and wanes in severity. He also endorses some photophobia and sensitivity to loud noises. He says he is unsure if different positions or activities alleviate or worsen his headache. He reports that he has been eating well, but has been sleeping around 4-5 hours per night due to his work schedule. He denies taking any medication for his headache. Denies neck pain, numbness, vision changes, fever, chills, nausea, vomiting, diarrhea, or bloody stool. No alcohol use. No family history of stroke or aneurysm. Denies history of headaches in the past. Patient reports he is currently managing his Crohn's well.     Allergies:  Percocet     Medications:    Decara  Atarax  Remicade    Past Medical History:    Crohn's disease  Mild TBI  Psoas abscess     Past Surgical History:    Colonoscopy   Combined cystoscopy   Laparoscopic resection ileocecal   Lap exploratory   Small intestine surgery     Family History:    History reviewed. No pertinent family history.      Social History:  Smoking status: never   Alcohol use: no   Drug use: no   PCP: Physician No Ref-Primary  Marital Status:  Single      Review of Systems   Constitutional: Negative for chills and fever.   Eyes: Positive for photophobia. Negative for visual disturbance.   Gastrointestinal: Negative for blood in stool, diarrhea, nausea and vomiting.   Musculoskeletal: Negative for neck pain.   Neurological: Positive for headaches. Negative for numbness.   All other systems reviewed and are negative.      Physical Exam     Patient Vitals for the past 24 hrs:   BP Temp Temp src Heart Rate Resp SpO2 Height Weight   10/24/19 2310 (!) 144/77 99.3  F (37.4  C) Oral 62 18 99 % 1.727 m (5' 8\") 66.4 kg (146 lb 6.2 oz) "        Physical Exam  Gen: Pleasant, appears stated age.  Well appearing, comfortable, pleasant.    Eye:   Pupils are equal, round, and reactive.     Sclera non-injected.    ENT:   Moist mucus membranes.     Normal tongue.    Oropharynx without lesions.    Cardiac:     Normal rate and regular rhythm.    No murmurs, gallops, or rubs.    Pulmonary:     Clear to auscultation bilaterally.    No wheezes, rales, or rhonchi.    Abdomen:     Normal active bowel sounds.     Abdomen is soft and non-distended, without focal tenderness.    Musculoskeletal:     Normal movement of all extremities without evidence for deficit.    Extremities:    No edema.    Skin:   Warm and dry.    Neurologic:    Speech is fluent, cognition is normal.     EOMI, symmetric grimace.     Str 5/5 in RUE, LUE, RLE, LLE.     Fine touch sensation intact in BUE/BLE.    Psychiatric:     Normal affect with appropriate interaction with examiner.      Emergency Department Course     Interventions:  2337: NS 1L IV Bolus   2343: Reglan 10 mg IV  2343: Toradol 15 mg IV  0034: Decadron 10 mg IV    Emergency Department Course:  2322: Nursing notes and vitals reviewed. I performed an exam of the patient as documented above.     IV inserted. Medicine administered as documented above.     0027: I rechecked the patient.     Findings and plan explained to the Patient. Patient discharged home with instructions regarding supportive care, medications, and reasons to return. The importance of close follow-up was reviewed.     I personally answered all related questions prior to discharge.     Impression & Plan      Medical Decision Making:  Giovanni Carranza is a 24 year old with history of Crohn's who presents with a headache.  He has a normal neurological exam.  He is well appearing. I considered a broad differential diagnosis for this patient including tension, migraine, analgesic rebound, occipital neuralgia.  I also considered other less common but serious causes  considered included meningitis, encephalitis, subarachnoid bleed, temporal arteritis, stroke, tumor, etc.  The patient has no signs of serious headache etiologies at this point.  No advanced imaging is indicated, nor is CT/lumbar puncture for SAH.  Patients questions were answered and he feels improved after above interventions in ED.  Supportive outpatient management is therefore indicated.  Headache precautions given for home.      Diagnosis:    ICD-10-CM    1. Acute nonintractable headache, unspecified headache type R51        Disposition:  discharged to home    Kailey URRUTIA, am serving as a scribe at 11:22 PM on 10/24/2019 to document services personally performed by Romi Altman MD based on my observations and the provider's statements to me.     Kailey Maxwell  10/24/2019    EMERGENCY DEPARTMENT       Romi Altman MD  10/25/19 7403

## 2020-01-04 ENCOUNTER — APPOINTMENT (OUTPATIENT)
Dept: CT IMAGING | Facility: CLINIC | Age: 25
End: 2020-01-04
Attending: EMERGENCY MEDICINE
Payer: MEDICAID

## 2020-01-04 ENCOUNTER — HOSPITAL ENCOUNTER (EMERGENCY)
Facility: CLINIC | Age: 25
Discharge: HOME | End: 2020-01-04
Attending: EMERGENCY MEDICINE | Admitting: EMERGENCY MEDICINE
Payer: MEDICAID

## 2020-01-04 VITALS
DIASTOLIC BLOOD PRESSURE: 83 MMHG | WEIGHT: 140 LBS | HEIGHT: 68 IN | SYSTOLIC BLOOD PRESSURE: 135 MMHG | OXYGEN SATURATION: 99 % | BODY MASS INDEX: 21.22 KG/M2 | TEMPERATURE: 99.3 F | RESPIRATION RATE: 18 BRPM

## 2020-01-04 DIAGNOSIS — R10.84 ABDOMINAL PAIN, GENERALIZED: ICD-10-CM

## 2020-01-04 LAB
ALBUMIN SERPL-MCNC: 3.9 G/DL (ref 3.4–5)
ALP SERPL-CCNC: 69 U/L (ref 40–150)
ALT SERPL W P-5'-P-CCNC: 21 U/L (ref 0–70)
ANION GAP SERPL CALCULATED.3IONS-SCNC: 6 MMOL/L (ref 3–14)
AST SERPL W P-5'-P-CCNC: 21 U/L (ref 0–45)
BASOPHILS # BLD AUTO: 0 10E9/L (ref 0–0.2)
BASOPHILS NFR BLD AUTO: 0.1 %
BILIRUB SERPL-MCNC: 0.2 MG/DL (ref 0.2–1.3)
BUN SERPL-MCNC: 16 MG/DL (ref 7–30)
CALCIUM SERPL-MCNC: 8.6 MG/DL (ref 8.5–10.1)
CHLORIDE SERPL-SCNC: 107 MMOL/L (ref 94–109)
CO2 SERPL-SCNC: 24 MMOL/L (ref 20–32)
CREAT SERPL-MCNC: 0.95 MG/DL (ref 0.66–1.25)
DIFFERENTIAL METHOD BLD: ABNORMAL
EOSINOPHIL # BLD AUTO: 0.1 10E9/L (ref 0–0.7)
EOSINOPHIL NFR BLD AUTO: 0.6 %
ERYTHROCYTE [DISTWIDTH] IN BLOOD BY AUTOMATED COUNT: 16.2 % (ref 10–15)
GFR SERPL CREATININE-BSD FRML MDRD: >90 ML/MIN/{1.73_M2}
GLUCOSE SERPL-MCNC: 105 MG/DL (ref 70–99)
HCT VFR BLD AUTO: 37.1 % (ref 40–53)
HGB BLD-MCNC: 10.5 G/DL (ref 13.3–17.7)
IMM GRANULOCYTES # BLD: 0 10E9/L (ref 0–0.4)
IMM GRANULOCYTES NFR BLD: 0.2 %
LIPASE SERPL-CCNC: 145 U/L (ref 73–393)
LYMPHOCYTES # BLD AUTO: 2.2 10E9/L (ref 0.8–5.3)
LYMPHOCYTES NFR BLD AUTO: 20.2 %
MCH RBC QN AUTO: 19.8 PG (ref 26.5–33)
MCHC RBC AUTO-ENTMCNC: 28.3 G/DL (ref 31.5–36.5)
MCV RBC AUTO: 70 FL (ref 78–100)
MONOCYTES # BLD AUTO: 0.7 10E9/L (ref 0–1.3)
MONOCYTES NFR BLD AUTO: 6.6 %
NEUTROPHILS # BLD AUTO: 7.7 10E9/L (ref 1.6–8.3)
NEUTROPHILS NFR BLD AUTO: 72.3 %
NRBC # BLD AUTO: 0 10*3/UL
NRBC BLD AUTO-RTO: 0 /100
PLATELET # BLD AUTO: 307 10E9/L (ref 150–450)
POTASSIUM SERPL-SCNC: 3.6 MMOL/L (ref 3.4–5.3)
PROT SERPL-MCNC: 8.2 G/DL (ref 6.8–8.8)
RBC # BLD AUTO: 5.29 10E12/L (ref 4.4–5.9)
SODIUM SERPL-SCNC: 137 MMOL/L (ref 133–144)
WBC # BLD AUTO: 10.6 10E9/L (ref 4–11)

## 2020-01-04 PROCEDURE — 25000128 H RX IP 250 OP 636: Performed by: EMERGENCY MEDICINE

## 2020-01-04 PROCEDURE — 25000132 ZZH RX MED GY IP 250 OP 250 PS 637: Performed by: EMERGENCY MEDICINE

## 2020-01-04 PROCEDURE — 96361 HYDRATE IV INFUSION ADD-ON: CPT

## 2020-01-04 PROCEDURE — 25000125 ZZHC RX 250: Performed by: EMERGENCY MEDICINE

## 2020-01-04 PROCEDURE — 74177 CT ABD & PELVIS W/CONTRAST: CPT

## 2020-01-04 PROCEDURE — 96376 TX/PRO/DX INJ SAME DRUG ADON: CPT

## 2020-01-04 PROCEDURE — 80053 COMPREHEN METABOLIC PANEL: CPT | Performed by: EMERGENCY MEDICINE

## 2020-01-04 PROCEDURE — 96375 TX/PRO/DX INJ NEW DRUG ADDON: CPT

## 2020-01-04 PROCEDURE — 85025 COMPLETE CBC W/AUTO DIFF WBC: CPT | Performed by: EMERGENCY MEDICINE

## 2020-01-04 PROCEDURE — 83690 ASSAY OF LIPASE: CPT | Performed by: EMERGENCY MEDICINE

## 2020-01-04 PROCEDURE — 25800030 ZZH RX IP 258 OP 636: Performed by: EMERGENCY MEDICINE

## 2020-01-04 PROCEDURE — 96374 THER/PROPH/DIAG INJ IV PUSH: CPT | Mod: 59

## 2020-01-04 PROCEDURE — 99285 EMERGENCY DEPT VISIT HI MDM: CPT | Mod: 25

## 2020-01-04 RX ORDER — KETOROLAC TROMETHAMINE 15 MG/ML
15 INJECTION, SOLUTION INTRAMUSCULAR; INTRAVENOUS ONCE
Status: COMPLETED | OUTPATIENT
Start: 2020-01-04 | End: 2020-01-04

## 2020-01-04 RX ORDER — HYDROMORPHONE HYDROCHLORIDE 1 MG/ML
0.5 INJECTION, SOLUTION INTRAMUSCULAR; INTRAVENOUS; SUBCUTANEOUS
Status: COMPLETED | OUTPATIENT
Start: 2020-01-04 | End: 2020-01-04

## 2020-01-04 RX ORDER — ONDANSETRON 2 MG/ML
4 INJECTION INTRAMUSCULAR; INTRAVENOUS EVERY 30 MIN PRN
Status: DISCONTINUED | OUTPATIENT
Start: 2020-01-04 | End: 2020-01-04 | Stop reason: HOSPADM

## 2020-01-04 RX ORDER — HYDROMORPHONE HYDROCHLORIDE 2 MG/1
2 TABLET ORAL ONCE
Status: COMPLETED | OUTPATIENT
Start: 2020-01-04 | End: 2020-01-04

## 2020-01-04 RX ORDER — SODIUM CHLORIDE 9 MG/ML
1000 INJECTION, SOLUTION INTRAVENOUS CONTINUOUS
Status: DISCONTINUED | OUTPATIENT
Start: 2020-01-04 | End: 2020-01-04 | Stop reason: HOSPADM

## 2020-01-04 RX ORDER — ONDANSETRON 4 MG/1
4 TABLET, ORALLY DISINTEGRATING ORAL EVERY 6 HOURS PRN
Qty: 20 TABLET | Refills: 0 | Status: SHIPPED | OUTPATIENT
Start: 2020-01-04 | End: 2020-01-07

## 2020-01-04 RX ORDER — IOPAMIDOL 755 MG/ML
71 INJECTION, SOLUTION INTRAVASCULAR ONCE
Status: COMPLETED | OUTPATIENT
Start: 2020-01-04 | End: 2020-01-04

## 2020-01-04 RX ADMIN — SODIUM CHLORIDE 1000 ML: 9 INJECTION, SOLUTION INTRAVENOUS at 04:13

## 2020-01-04 RX ADMIN — IOPAMIDOL 71 ML: 755 INJECTION, SOLUTION INTRAVENOUS at 04:40

## 2020-01-04 RX ADMIN — HYDROMORPHONE HYDROCHLORIDE 0.5 MG: 1 INJECTION, SOLUTION INTRAMUSCULAR; INTRAVENOUS; SUBCUTANEOUS at 05:24

## 2020-01-04 RX ADMIN — KETOROLAC TROMETHAMINE 15 MG: 15 INJECTION, SOLUTION INTRAMUSCULAR; INTRAVENOUS at 04:13

## 2020-01-04 RX ADMIN — SODIUM CHLORIDE 61 ML: 9 INJECTION, SOLUTION INTRAVENOUS at 04:40

## 2020-01-04 RX ADMIN — HYDROMORPHONE HYDROCHLORIDE 2 MG: 2 TABLET ORAL at 06:14

## 2020-01-04 RX ADMIN — SODIUM CHLORIDE 1000 ML: 9 INJECTION, SOLUTION INTRAVENOUS at 05:24

## 2020-01-04 RX ADMIN — ONDANSETRON 4 MG: 2 INJECTION INTRAMUSCULAR; INTRAVENOUS at 05:24

## 2020-01-04 ASSESSMENT — ENCOUNTER SYMPTOMS
DYSURIA: 0
FEVER: 0
ABDOMINAL PAIN: 1
VOMITING: 1

## 2020-01-04 ASSESSMENT — MIFFLIN-ST. JEOR: SCORE: 1604.54

## 2020-01-04 NOTE — LETTER
January 4, 2020      To Whom It May Concern:      Giovanni Carranza was seen in our Emergency Department today, 01/04/20.  I expect his condition to improve over the next few days.  He may return to work/school when improved.    Sincerely,        Niharika, ED Staff RN

## 2020-01-04 NOTE — ED AVS SNAPSHOT
Emergency Department  [address] [place] [address]  Phone:  [phone]  Fax:  [phone]                                    Giovanni Carranza   MRN: [id number]    Department:   Emergency Department   Date of Visit:  1/4/2020           After Visit Summary Signature Page    I have received my discharge instructions, and my questions have been answered. I have discussed any challenges I see with this plan with the nurse or doctor.    ..........................................................................................................................................  Patient/Patient Representative Signature      ..........................................................................................................................................  Patient Representative Print Name and Relationship to Patient    ..................................................               ................................................  Date                                   Time    ..........................................................................................................................................  Reviewed by Signature/Title    ...................................................              ..............................................  Date                                               Time          22EPIC Rev 08/18

## 2020-01-04 NOTE — ED NOTES
Bed: ED20  Expected date: 1/4/20  Expected time: 3:30 AM  Means of arrival: Ambulance  Comments:  Nick 535 24M Abd. Pain

## 2020-01-04 NOTE — ED PROVIDER NOTES
History     Chief Complaint:  Abdominal Pain    The history is provided by the patient.      Giovanni Carranza is a 24 year old male with a history of Crohn's disease who presents to the emergency department today via EMS for evaluation of abdominal pain. The patient reports that yesterday he woke up feeling unwell, and developed worsening abdominal pain throughout the day. Overnight this continued to increase and has been associated with diarrhea, nausea, and three bouts of vomiting. He states it feels like a mix of his Crohn's and possibly food poisoning. The patient adds he had gone out to eat two evenings ago, and the friend who he ate with has been feeling somewhat ill, although not as intense as he is. He denies having any constipation, fevers, or bloody stools. En route with EMS the patient received 4 mg of Zofran. The patient reports he has been following with [facility] in regards to his Crohn's and he has been fully compliant with his medications.     Allergies:  Oxycodone-Acetaminophen     Medications:    Pentasa    Decara   Hydroxyzine     Past Medical History:    Crohn's disease  Psoas abscess, right   TBI, mild   Depression     Past Surgical History:    Combined cystoscopy, insert catheter ureter, bilateral   Laparoscopic resection ileocecal   Exploratory laparotomy   Small intestine surgery     Family History:    Asthma   Diabetes  Hypertension     Social History:  The patient was accompanied to the ED by his friend.  Smoking Status: Never Smoker  Smokeless Tobacco: Never Used  Alcohol Use: Negative   Drug Use: Negative    Marital Status:  Single    Review of Systems   Constitutional: Negative for fever.   Gastrointestinal: Positive for abdominal pain and vomiting.   Genitourinary: Negative for dysuria.   All other systems reviewed and are negative.        Physical Exam     Patient Vitals for the past 24 hrs:   BP Temp Temp src Heart Rate Resp SpO2 Height Weight   01/04/20 0345 135/83 99.3  F (37.4  C)  "Oral 60 18 99 % 1.727 m (5' 8\") 63.5 kg (140 lb)      Physical Exam    General: Alert, interactive in mild distress  Head:  Scalp is atraumatic  Eyes:  The pupils are equal, round, and reactive to light    EOM's intact    No scleral icterus  ENT:      Nose:  The external nose is normal  Ears:  External ears are normal  Mouth/Throat: The oropharynx is normal    Mucus membranes are dry  Neck:  Normal range of motion.      There is no rigidity.    Trachea is in the midline         CV:  Regular rate and rhythm    No murmur   Resp:  Breath sounds are clear bilaterally    Non-labored, no retractions or accessory muscle use      GI:  Abdomen is soft, no distension    Diffuse abdominal tenderness       MS:  Normal strength in all 4 extremities  Skin:  Warm and dry, No rash or lesions noted.  Neuro: Strength 5/5 x4.    Psych:  Awake. Alert.  Normal affect.      Appropriate interactions.    Emergency Department Course     Imaging:  Radiology findings were communicated with the patient who voiced understanding of the findings.  CT, Abdomen & Pelvis with Contrast  1.  No acute bowel obstruction or inflammation.  2.  Fluid and gas-filled colon to the rectum.  Reading per radiology     Laboratory:  Laboratory findings were communicated with the patient who voiced understanding of the findings.  CBC: WNL (WBC 10.6, HGB 10.5, )  CMP: Glucose 105 (H). O/w WNL (Creatinine 0.95)    Lipase: 145     Interventions:  0413 0.9% NaCl 1L IV   0413 Toradol 15 mg IV   0524 Zofran 4 mg IV   0524 0.9% NaCl 1L IV Bolus   0524 Dilaudid 0.5 mg IV   0614 Dilaudid 0.5 mg IV     Emergency Department Course:  0342 IV was inserted and blood was drawn for laboratory testing, results above.   0354 Nursing notes and vitals reviewed.  0357 I performed an exam of the patient as documented above.   0447 The patient was sent for a CT scan while in the emergency department, results above.    0521 Patient was rechecked and updated with laboratory and " imaging results thus far.    Findings and plan explained to the Patient and significant other. Patient discharged home with instructions regarding supportive care, medications, and reasons to return. The importance of close follow-up was reviewed. The patient was prescribed Zofran.     I personally reviewed the laboratory and imaging results with the Patient and significant other and answered all related questions prior to discharge.      Impression & Plan      Medical Decision Making:    Mr. Carranza was seen and evaluated, the above work up was undertaken. CT imaging was preformed with concern for bowel obstruction versus an acute crohn's flare. Thankfully this returned unremarkable. His laboratory workup was unremarkable. There are no signs of acute peritonitis. His significant other is with him and relays that she has had similar symptoms as well. I think this likely represents a food-borne illness. There is no signs of sepsis syndrome or a more concerning illness and I believe he can be safely be discharged to home. I have prescribed Zofran for nausea and I have recommended return if new symptoms develop.       Diagnosis:    ICD-10-CM    1. Abdominal pain, generalized R10.84        Disposition:  The patient is discharged to home.     Discharge Medications:  New Prescriptions    ONDANSETRON (ZOFRAN ODT) 4 MG ODT TAB    Take 1 tablet (4 mg) by mouth every 6 hours as needed       Scribe Disclosure:  I, Jaylyn Murillo, am serving as a scribe at 3:58 AM on 1/4/2020 to document services personally performed by Johnathan Ramirez MD based on my observations and the provider's statements to me.    1/4/2020    EMERGENCY DEPARTMENT       Johnathan Ramirez MD  01/04/20 0758

## 2020-01-04 NOTE — ED TRIAGE NOTES
"Pt has hx of crohns, states woke up today \"Feeling bad\", c/o diffuse abd pain worsening, vomitted x 3. Given 4mg IV Zofran via EMS with some relief of nausea. States pain \"goes into both my legs and makes me feel weak\".   "

## 2020-02-24 ENCOUNTER — HEALTH MAINTENANCE LETTER (OUTPATIENT)
Age: 25
End: 2020-02-24

## 2020-03-24 ENCOUNTER — HOSPITAL ENCOUNTER (EMERGENCY)
Facility: CLINIC | Age: 25
Discharge: HOME | End: 2020-03-24
Attending: PHYSICIAN ASSISTANT | Admitting: PHYSICIAN ASSISTANT
Payer: COMMERCIAL

## 2020-03-24 VITALS
DIASTOLIC BLOOD PRESSURE: 81 MMHG | HEIGHT: 68 IN | OXYGEN SATURATION: 99 % | SYSTOLIC BLOOD PRESSURE: 134 MMHG | WEIGHT: 136 LBS | BODY MASS INDEX: 20.61 KG/M2 | RESPIRATION RATE: 18 BRPM

## 2020-03-24 DIAGNOSIS — Z20.822 EXPOSURE TO COVID-19 VIRUS: ICD-10-CM

## 2020-03-24 DIAGNOSIS — R05.9 COUGH: ICD-10-CM

## 2020-03-24 DIAGNOSIS — R06.00 DYSPNEA: ICD-10-CM

## 2020-03-24 PROCEDURE — 99282 EMERGENCY DEPT VISIT SF MDM: CPT

## 2020-03-24 ASSESSMENT — ENCOUNTER SYMPTOMS
CHILLS: 1
COUGH: 1
FEVER: 0
SHORTNESS OF BREATH: 1
GASTROINTESTINAL NEGATIVE: 1

## 2020-03-24 ASSESSMENT — MIFFLIN-ST. JEOR: SCORE: 1586.39

## 2020-03-24 NOTE — ED AVS SNAPSHOT
Emergency Department  [address] [place] [address]  Phone:  [phone]  Fax:  [phone]                                    Giovanni Carranza   MRN: [id number]    Department:   Emergency Department   Date of Visit:  3/24/2020           After Visit Summary Signature Page    I have received my discharge instructions, and my questions have been answered. I have discussed any challenges I see with this plan with the nurse or doctor.    ..........................................................................................................................................  Patient/Patient Representative Signature      ..........................................................................................................................................  Patient Representative Print Name and Relationship to Patient    ..................................................               ................................................  Date                                   Time    ..........................................................................................................................................  Reviewed by Signature/Title    ...................................................              ..............................................  Date                                               Time          22EPIC Rev 08/18

## 2020-03-24 NOTE — ED PROVIDER NOTES
"  History     Chief Complaint:  Shortness of Breath and Cough      HPI   Giovanni Carranza is a 24 year old male with a history of Crohn's disease, on IV Remicade, who presents for evaluation of shortness of breath and cough. Patient reports that he has been exposed to COVID-19 through his friend and friend's brother. The friend's brother caught COVID two weeks ago and has tested positive for it. The patient's friend lives with her brother, but did not known this initially. The patient's friend then became sick within in the last week with flu like symptom, just like her brother. The patient himself saw this friend three days ago, and he was unaware that she was ill and likely positive at the time; the friend has never been tested. Last night, patient began coughing. This morning, he awoke because he was very short of breath and struggling to catch his breath while lying down. He also endorses chills, but not fever. He denies associated GI symptoms.     Allergies:  Percocet [Oxycodone-Acetaminophen]      Medications:    Remicade     Past Medical History:    Crohn's disease   Psoas abscess, right   Mild TBI    Past Surgical History:    Colonoscopy   Combined cystoscopy, insert catheter ureter bilateral   Laparoscopic resection ileocecal   Laparotomy exploratory   Small intestine surgery     Family History:    History reviewed. No pertinent family history.      Social History:  The patient was alone.  Smoking Status: Never  Smokeless Tobacco: Never  Alcohol Use: No    Marital Status:  Single      Review of Systems   Constitutional: Positive for chills. Negative for fever.   Respiratory: Positive for cough and shortness of breath.    Gastrointestinal: Negative.    All other systems reviewed and are negative.      Physical Exam     Patient Vitals for the past 24 hrs:   BP Heart Rate Resp SpO2 Height Weight   03/24/20 1814 134/81 56 18 99 % 1.727 m (5' 8\") 61.7 kg (136 lb)       Physical Exam  Constitutional: Pleasant. " Cooperative.  Eyes: Pupils equally round  HENT: Head is normal in appearance. Oropharynx is normal with moist mucus membranes.  Cardiovascular: Regular rate and rhythm without murmurs.  Respiratory: Normal respiratory effort, lungs clear to auscultation  Musculoskeletal: No asymmetry  Skin: Normal, without rash.  Lymphatic: No edema  Neurologic: Cranial nerves grossly intact, normal cognition, no apparent deficits.  Psychiatric: Normal affect.  Nursing notes and vital signs reviewed.    Emergency Department Course     Emergency Department Course:  Past medical records, nursing notes, and vitals reviewed.    1830 I conducted my initial phone interview with patient.     1845  I performed my examination of the patient as documented above.     Findings and plan explained to the Patient. Patient discharged home with instructions regarding supportive care, medications, and reasons to return. The importance of close follow-up was reviewed.     Impression & Plan     Medical Decision Making:  Giovanni Carranza is a 24 year old male who presents today for evaluation of cough and dyspnea. He has contact who was recently tested COVID positive. See HPI as above for additional details. Vitals and physical exam as above. This is consistent with an influenza-like or coronavirus illness.  Given the public health emergency and SARS-CoV pandemic, he is at risk for pneumonia but no signs of this are detected on today's visit. Given the overall appearance of the patient, and the fact that they are not a healthcare worker, , or other high-risk population, I will not admit or test for coronavirus. Return to the ED is indicated for high fevers and/or worsening life-threatening symptoms such as increasing productive cough, shortness of breath, or confusion.  I have otherwise recommended they stay home, practice good personal health hygiene, and try to limit interactions with others (self quarantine if possible for 7 days) or  until improvement of their symptoms and 3 days have passed since they have been fever-free. There are no other signs of serious bacterial infection. I considered a large differential such as bacteremia, meningitis, bacterial pneumonia, strep pharyngitis, influenza, among others. Discussed reasons to return. All questions answered. Patient discharged to home in stable condition.    Diagnosis:    ICD-10-CM    1. Cough  R05    2. Dyspnea  R06.00    3. Exposure to Covid-19 Virus  Z20.828        Disposition:  Discharged to home.    Scribe Disclosure:  I, Rupali Macdonald, am serving as a scribe at 6:13 PM on 3/24/2020 to document services personally performed by Camron Moss PA-C based on my observations and the provider's statements to me.   3/24/2020    EMERGENCY DEPARTMENT       Camron Moss PA-C  03/24/20 1927

## 2020-03-24 NOTE — DISCHARGE INSTRUCTIONS
Discharge Instructions  COVID-19    Your Provider has determined that you should practice self-isolation and self-monitoring in order to protect yourself and your community from COVID-19, which is the disease caused by a new coronavirus. The virus spreads from person to person primarily by droplets when an infected person coughs or sneezes and the droplet either lands on another person or that other person touches a surface with the droplet on it. Diagnosis of COVID-19 can be made with a test but many times the test is unavailable or not necessary. There is no specific treatment or medicine for the disease.    Symptoms of COVID-19  Many people have no symptoms or mild symptoms.  Symptoms may usually appear 4 to 5 days (up to 14 days) after contact with another ill person. Some people will get severe symptoms and pneumonia. Usual symptoms are:     Fever    Cough    Trouble breathing    Less common symptoms are: Headache, body aches, sore throat, sneezing,               diarrhea.    How to Care for Yourself    Stay home  Most people will recover from illness with mild symptoms.  Isolation by staying home is the best method to prevent the spread of the illness. Do not go to work or school. Have a friend or relative do your shopping. Do not use public transportation (bus, train) or ridesharing (Lyft, Uber).    How long should I stay home?    If you have symptoms of a respiratory disease (fever, cough), you should stay home for at least 7 days, and for 3 days with no fever and improvement of respiratory symptoms--whichever is longer. (Your fever should be gone for 3 days without using fever-reducing medicine.)  For example, if you have a fever and coughing for 4 days, you need to stay home 3 more days with no fever for a total of 7 days. Or, if you have a fever and coughing for 5 days, you need to stay home 3 more days with no fever for a total of 8 days.    Separate yourself from other people in your home.? As much as  possible, you should stay in one room and away from other people in your home. Also, use a separate bathroom, if possible. Avoid handling pets or other animals while sick.     Wear a facemask: if you need to be around other people and cover your mouth and nose with a tissue when you cough or sneeze.     Avoid sharing personal household items. You should not share dishes, drinking glasses, forks/knives/spoons, towels, or bedding with other people in your home. After using these items, they should be washed with soap and water. Clean parts of your home that are touched often (doorknobs, faucets, countertops, etc.) daily.     Wash your hands often with soap and water for at least 20 seconds or use an alcohol-based hand  containing at least 60% alcohol.     Avoid touching your face    Treat your symptoms: Take over the counter medications like ibuprofen (Advil or Motrin) or Acetaminophen (Tylenol). Drink fluids. Rest.    Watch for worsening symptoms: shortness of breath, or difficulty breathing.    Return to the Emergency Department if:    If you are developing worsening breathing, shortness of breath, or feel worse you should seek medical attention.  If you are uncertain, contact your health care provider/clinic. If you need emergency medical attention, call 911 and tell them you have been ill.

## 2020-08-06 ENCOUNTER — HOSPITAL ENCOUNTER (OUTPATIENT)
Age: 25
End: 2020-08-06
Payer: COMMERCIAL

## 2020-09-02 ENCOUNTER — OFFICE VISIT (OUTPATIENT)
Dept: FAMILY MEDICINE | Facility: CLINIC | Age: 25
End: 2020-09-02
Payer: COMMERCIAL

## 2020-09-02 VITALS
TEMPERATURE: 98.9 F | BODY MASS INDEX: 22.05 KG/M2 | SYSTOLIC BLOOD PRESSURE: 108 MMHG | HEART RATE: 63 BPM | DIASTOLIC BLOOD PRESSURE: 60 MMHG | OXYGEN SATURATION: 98 % | WEIGHT: 145 LBS | RESPIRATION RATE: 14 BRPM

## 2020-09-02 DIAGNOSIS — Z11.3 SCREEN FOR STD (SEXUALLY TRANSMITTED DISEASE): Primary | ICD-10-CM

## 2020-09-02 PROCEDURE — 87491 CHLMYD TRACH DNA AMP PROBE: CPT | Performed by: FAMILY MEDICINE

## 2020-09-02 PROCEDURE — 99213 OFFICE O/P EST LOW 20 MIN: CPT | Performed by: FAMILY MEDICINE

## 2020-09-02 PROCEDURE — 87591 N.GONORRHOEAE DNA AMP PROB: CPT | Performed by: FAMILY MEDICINE

## 2020-09-02 PROCEDURE — 36415 COLL VENOUS BLD VENIPUNCTURE: CPT | Performed by: FAMILY MEDICINE

## 2020-09-02 PROCEDURE — 86780 TREPONEMA PALLIDUM: CPT | Performed by: FAMILY MEDICINE

## 2020-09-02 PROCEDURE — 87389 HIV-1 AG W/HIV-1&-2 AB AG IA: CPT | Performed by: FAMILY MEDICINE

## 2020-09-02 RX ORDER — MESALAMINE 500 MG/1
CAPSULE ORAL
COMMUNITY
Start: 2020-07-31 | End: 2023-07-27

## 2020-09-02 NOTE — PROGRESS NOTES
Subjective     Giovanni Carranza is a 25 year old male who presents to clinic today for the following health issues:    HPI       Concern - STI Testing  Onset: today  Description: pt is here today for routine STI testing, no symptoms, was recently exposed to chlamydia        Review of Systems   Constitutional, HEENT, cardiovascular, pulmonary, gi and gu systems are negative, except as otherwise noted.      Objective    There were no vitals taken for this visit.  There is no height or weight on file to calculate BMI.  Physical Exam   GENERAL APPEARANCE: healthy, alert and no distress            Assessment & Plan     Screen for STD (sexually transmitted disease)    - NEISSERIA GONORRHOEA PCR  - CHLAMYDIA TRACHOMATIS PCR  - HIV Antigen Antibody Combo  - Treponema Abs w Reflex to RPR and Titer         There are no Patient Instructions on file for this visit.    No follow-ups on file.    Darrel Fair MD  [facility]

## 2020-09-03 LAB
C TRACH DNA SPEC QL NAA+PROBE: NEGATIVE
HIV 1+2 AB+HIV1 P24 AG SERPL QL IA: NONREACTIVE
N GONORRHOEA DNA SPEC QL NAA+PROBE: NEGATIVE
SPECIMEN SOURCE: NORMAL
SPECIMEN SOURCE: NORMAL
T PALLIDUM AB SER QL: NONREACTIVE

## 2020-09-13 NOTE — RESULT ENCOUNTER NOTE
Dear Giovanni,    Your tests were all normal. A copy of your tests are available in My Chart.    Glad to see you at your appointment.  If you have any questions feel free to call.      Sincerely,      POLA Cohen.

## 2020-12-13 ENCOUNTER — HEALTH MAINTENANCE LETTER (OUTPATIENT)
Age: 25
End: 2020-12-13

## 2021-04-17 ENCOUNTER — HEALTH MAINTENANCE LETTER (OUTPATIENT)
Age: 26
End: 2021-04-17

## 2021-05-18 NOTE — TELEPHONE ENCOUNTER
Certified letter delivered on 2/5/18.  No response or call back from Patient.    Elan Guadarrama, RN  [facility] RN  Lung Nodule and ED Lab Result F/u RN  Epic pool (ED late result f/u RN): [id number]  [facility] INCIDENTAL RADIOLOGY F/U NURSES: [id number]  # [phone]     Left message for patient to call this office back

## 2021-09-22 ENCOUNTER — VIRTUAL VISIT (OUTPATIENT)
Dept: PEDIATRICS | Facility: CLINIC | Age: 26
End: 2021-09-22
Payer: COMMERCIAL

## 2021-09-22 DIAGNOSIS — K50.811 CROHN'S DISEASE OF BOTH SMALL AND LARGE INTESTINE WITH RECTAL BLEEDING (H): Primary | ICD-10-CM

## 2021-09-22 PROCEDURE — 99214 OFFICE O/P EST MOD 30 MIN: CPT | Mod: 95 | Performed by: NURSE PRACTITIONER

## 2021-09-22 NOTE — PROGRESS NOTES
Assessment & Plan     Crohn's disease of both small and large intestine with rectal bleeding (H)  - Should not need referral as he is already following with [facility]. I called [facility]. Last visit was with Dr. Miller July 2020. They can get patient in for virtual visit in 1 week. I will have patient come in for labs prior that. He has a known mild anemia. Hold off on medication changes pending labs. If concerning, I will reach out to GI to see if I should initiate budesonide prior to upcoming visit  - No fevers, N/V, severe pain to suggest infection or obstruction. He is very pleasant on the phone. No distress. Reports abdominal pain is similar to what he normally experiences with Crohns over the past year.   - CBC with platelets; Future  - Comprehensive metabolic panel (BMP + Alb, Alk Phos, ALT, AST, Total. Bili, TP); Future  - CRP, inflammation; Future    34 minutes spent in patient chart on day of visit doing chart review, patient visit, calling [facility] and documentation    Return in about 1 month (around 10/22/2021) for Physical Exam.    AGNES Castro [facility] JOSE      Giovanni Carranza is a 26 year old male who is being evaluated via a billable telephone visit.      What phone number would you like to be contacted at? [phone]  How would you like to obtain your AVS? MyChart         Subjective     Giovanni Carranza is a 26 year old male who presents via phone visit today for the following health issues:      He has a history of Crohn's s/p ileocecal resection in 2015    Previously following with [facility]  Unsure of last visit  On Pentasa, prescribed by [facility]  No longer receiving Remicade infusions    Notes streak of blood on stool on 2 separate occasions in past 2 weeks  No blood in toilet bowl  No fevers, N/V  Abdominal pain is unchanged       Review of Systems   Constitutional, HEENT, cardiovascular, pulmonary, GI, , musculoskeletal, neuro, skin, endocrine and psych systems are  negative, except as otherwise noted.       Objective          Vitals:  No vitals were obtained today due to virtual visit.    healthy, alert and no distress  PSYCH: Alert and oriented times 3; coherent speech, normal   rate and volume, able to articulate logical thoughts, able   to abstract reason, no tangential thoughts, no hallucinations   or delusions  His affect is normal and pleasant  RESP: No cough, no audible wheezing, able to talk in full sentences  Remainder of exam unable to be completed due to telephone visits        Phone call duration:  14 minutes

## 2021-09-26 ENCOUNTER — HEALTH MAINTENANCE LETTER (OUTPATIENT)
Age: 26
End: 2021-09-26

## 2021-11-29 ENCOUNTER — HOSPITAL ENCOUNTER (EMERGENCY)
Facility: CLINIC | Age: 26
Discharge: LEFT BEFORE DISCHARGE | End: 2021-11-29
Payer: COMMERCIAL

## 2021-12-01 LAB
ALBUMIN SERPL-MCNC: 3.9 G/DL (ref 3.4–5)
ALP SERPL-CCNC: 63 U/L (ref 40–150)
ALT SERPL W P-5'-P-CCNC: 28 U/L (ref 0–70)
ANION GAP SERPL CALCULATED.3IONS-SCNC: 4 MMOL/L (ref 3–14)
AST SERPL W P-5'-P-CCNC: 22 U/L (ref 0–45)
BASOPHILS # BLD AUTO: 0.1 10E3/UL (ref 0–0.2)
BASOPHILS NFR BLD AUTO: 1 %
BILIRUB SERPL-MCNC: 0.4 MG/DL (ref 0.2–1.3)
BUN SERPL-MCNC: 8 MG/DL (ref 7–30)
CALCIUM SERPL-MCNC: 8.4 MG/DL (ref 8.5–10.1)
CHLORIDE BLD-SCNC: 105 MMOL/L (ref 94–109)
CO2 SERPL-SCNC: 30 MMOL/L (ref 20–32)
CREAT SERPL-MCNC: 1.03 MG/DL (ref 0.66–1.25)
EOSINOPHIL # BLD AUTO: 0.2 10E3/UL (ref 0–0.7)
EOSINOPHIL NFR BLD AUTO: 4 %
ERYTHROCYTE [DISTWIDTH] IN BLOOD BY AUTOMATED COUNT: 16.9 % (ref 10–15)
GFR SERPL CREATININE-BSD FRML MDRD: >90 ML/MIN/1.73M2
GLUCOSE BLD-MCNC: 96 MG/DL (ref 70–99)
HCT VFR BLD AUTO: 35.2 % (ref 40–53)
HGB BLD-MCNC: 9.6 G/DL (ref 13.3–17.7)
IMM GRANULOCYTES # BLD: 0 10E3/UL
IMM GRANULOCYTES NFR BLD: 0 %
LIPASE SERPL-CCNC: 119 U/L (ref 73–393)
LYMPHOCYTES # BLD AUTO: 2.5 10E3/UL (ref 0.8–5.3)
LYMPHOCYTES NFR BLD AUTO: 48 %
MCH RBC QN AUTO: 19.6 PG (ref 26.5–33)
MCHC RBC AUTO-ENTMCNC: 27.3 G/DL (ref 31.5–36.5)
MCV RBC AUTO: 72 FL (ref 78–100)
MONOCYTES # BLD AUTO: 0.9 10E3/UL (ref 0–1.3)
MONOCYTES NFR BLD AUTO: 17 %
NEUTROPHILS # BLD AUTO: 1.6 10E3/UL (ref 1.6–8.3)
NEUTROPHILS NFR BLD AUTO: 30 %
NRBC # BLD AUTO: 0 10E3/UL
NRBC BLD AUTO-RTO: 0 /100
PLATELET # BLD AUTO: 264 10E3/UL (ref 150–450)
POTASSIUM BLD-SCNC: 3.4 MMOL/L (ref 3.4–5.3)
PROT SERPL-MCNC: 8.3 G/DL (ref 6.8–8.8)
RBC # BLD AUTO: 4.91 10E6/UL (ref 4.4–5.9)
SODIUM SERPL-SCNC: 139 MMOL/L (ref 133–144)
WBC # BLD AUTO: 5.2 10E3/UL (ref 4–11)

## 2021-12-01 PROCEDURE — 80053 COMPREHEN METABOLIC PANEL: CPT | Performed by: EMERGENCY MEDICINE

## 2021-12-01 PROCEDURE — 85025 COMPLETE CBC W/AUTO DIFF WBC: CPT | Performed by: EMERGENCY MEDICINE

## 2021-12-01 PROCEDURE — 36415 COLL VENOUS BLD VENIPUNCTURE: CPT | Performed by: EMERGENCY MEDICINE

## 2021-12-01 PROCEDURE — 99284 EMERGENCY DEPT VISIT MOD MDM: CPT | Mod: 25

## 2021-12-01 PROCEDURE — 83690 ASSAY OF LIPASE: CPT | Performed by: EMERGENCY MEDICINE

## 2021-12-01 ASSESSMENT — MIFFLIN-ST. JEOR: SCORE: 1602.48

## 2021-12-02 ENCOUNTER — APPOINTMENT (OUTPATIENT)
Dept: CT IMAGING | Facility: CLINIC | Age: 26
End: 2021-12-02
Attending: EMERGENCY MEDICINE
Payer: COMMERCIAL

## 2021-12-02 ENCOUNTER — HOSPITAL ENCOUNTER (EMERGENCY)
Facility: CLINIC | Age: 26
Discharge: HOME | End: 2021-12-02
Attending: EMERGENCY MEDICINE | Admitting: EMERGENCY MEDICINE
Payer: COMMERCIAL

## 2021-12-02 VITALS
BODY MASS INDEX: 20.73 KG/M2 | HEIGHT: 69 IN | SYSTOLIC BLOOD PRESSURE: 128 MMHG | OXYGEN SATURATION: 98 % | TEMPERATURE: 99.5 F | WEIGHT: 140 LBS | RESPIRATION RATE: 16 BRPM | DIASTOLIC BLOOD PRESSURE: 77 MMHG | HEART RATE: 53 BPM

## 2021-12-02 DIAGNOSIS — R10.84 ABDOMINAL PAIN, GENERALIZED: ICD-10-CM

## 2021-12-02 PROCEDURE — 250N000011 HC RX IP 250 OP 636: Performed by: EMERGENCY MEDICINE

## 2021-12-02 PROCEDURE — 74177 CT ABD & PELVIS W/CONTRAST: CPT

## 2021-12-02 PROCEDURE — 250N000009 HC RX 250: Performed by: EMERGENCY MEDICINE

## 2021-12-02 RX ORDER — IOPAMIDOL 755 MG/ML
71 INJECTION, SOLUTION INTRAVASCULAR ONCE
Status: COMPLETED | OUTPATIENT
Start: 2021-12-02 | End: 2021-12-02

## 2021-12-02 RX ADMIN — IOPAMIDOL 71 ML: 755 INJECTION, SOLUTION INTRAVENOUS at 01:54

## 2021-12-02 RX ADMIN — SODIUM CHLORIDE 60 ML: 900 INJECTION INTRAVENOUS at 01:54

## 2021-12-02 ASSESSMENT — ENCOUNTER SYMPTOMS
DYSURIA: 0
FEVER: 0
BLOOD IN STOOL: 1
DIFFICULTY URINATING: 0
ABDOMINAL PAIN: 1

## 2021-12-02 NOTE — ED PROVIDER NOTES
"  History     Chief Complaint:  Abdominal Pain     The history is provided by the patient.      Giovanni Craranza is a 26 year old male with history of Crohn's disease, H. pylori who presents with 3 weeks of worsening abdominal pain. He reports that he has Crohn's disease and has intermittent abdominal pain with this, but the abdominal pain has been more prevalent over the last 3 weeks. The pain is most noticeable in the left upper quadrant. He additionally mentions that he had two bloody stools last month but this has since resolved. His stools tend to fluctuate between loose and firm. No recent fevers, dysuria, or other difficulty urinating. Giovanni reports that he follows with [facility] and has been taking Pentasa for 2 years. He denies recently taking steroid medications. Reports that his last CT was performed 1 year ago.    Review of Systems   Constitutional: Negative for fever.   Gastrointestinal: Positive for abdominal pain and blood in stool (resolved).   Genitourinary: Negative for difficulty urinating and dysuria.   All other systems reviewed and are negative.    Allergies:  Percocet    Medications:  Pentasa    Past Medical History:    Crohn's disease  Psoas abscess, right  H. pylori  Dysthymia  Mild TBI   Depression     Past Surgical History:    Colonoscopy  Cystoscopy and catheter insertion  Ileocecal resection  Exploratory laparotomy    Family History:    Mother: asthma, diabetes, hypertension     Social History:  Patient presents to the ED alone.  Patient's GI clinic is [facility].    Physical Exam     Patient Vitals for the past 24 hrs:   BP Temp Temp src Pulse Resp SpO2 Height Weight   12/02/21 0230 -- -- -- 53 -- 98 % -- --   12/02/21 0200 -- -- -- 50 -- -- -- --   12/02/21 0130 128/77 -- -- 51 -- -- -- --   12/02/21 0100 122/75 -- -- 52 -- 98 % -- --   12/01/21 2128 121/62 99.5  F (37.5  C) Temporal 59 16 99 % 1.74 m (5' 8.5\") 63.5 kg (140 lb)       Physical Exam  General: Sitting up in bed  Eyes:  The " pupils are equal and round    Conjunctivae and sclerae are normal  ENT:    Wearing a mask  Neck:  Normal range of motion  CV:  Regular rate, regular rhythm     Skin warm and well perfused   Resp:  Non labored breathing on room air    No tachypnea    No cough heard    Lungs clear bilaterally  GI:  Abdomen is soft, there is no rigidity    No distension    No rebound tenderness     Minimal diffuse abdominal tenderness  MS:  Normal muscular tone  Skin:  No rash or acute skin lesions noted  Neuro:   Awake, alert.      Speech is normal and fluent.    Face is symmetric.     Moves all extremities equally  Psych: Normal affect.  Appropriate interactions.    Emergency Department Course     Imaging:  CT Abdomen Pelvis w Contrast   Final Result   IMPRESSION:    1.  The stomach is very distended with food debris. No evidence of outlet obstruction.   2.  No bowel obstruction or inflammation.      Reading per radiology     Laboratory:  Labs Ordered and Resulted from Time of ED Arrival to Time of ED Departure   COMPREHENSIVE METABOLIC PANEL - Abnormal       Result Value    Sodium 139      Potassium 3.4      Chloride 105      Carbon Dioxide (CO2) 30      Anion Gap 4      Urea Nitrogen 8      Creatinine 1.03      Calcium 8.4 (*)     Glucose 96      Alkaline Phosphatase 63      AST 22      ALT 28      Protein Total 8.3      Albumin 3.9      Bilirubin Total 0.4      GFR Estimate >90     CBC WITH PLATELETS AND DIFFERENTIAL - Abnormal    WBC Count 5.2      RBC Count 4.91      Hemoglobin 9.6 (*)     Hematocrit 35.2 (*)     MCV 72 (*)     MCH 19.6 (*)     MCHC 27.3 (*)     RDW 16.9 (*)     Platelet Count 264      % Neutrophils 30      % Lymphocytes 48      % Monocytes 17      % Eosinophils 4      % Basophils 1      % Immature Granulocytes 0      NRBCs per 100 WBC 0      Absolute Neutrophils 1.6      Absolute Lymphocytes 2.5      Absolute Monocytes 0.9      Absolute Eosinophils 0.2      Absolute Basophils 0.1      Absolute Immature  Granulocytes 0.0      Absolute NRBCs 0.0     LIPASE - Normal    Lipase 119       Emergency Department Course:    Reviewed:  I reviewed nursing notes, vitals, past medical history, care everywhere    Assessments:  0037 I obtained history and examined the patient as noted above.   0252 I rechecked the patient and explained findings.     Disposition:  The patient was discharged to home.     Impression & Plan     Medical Decision Making:  Giovanni Carranza is a 26 year old male who presents to the emergency department with abdominal pain.  Patient with mild diffuse abdominal tenderness.  Patient looks well.  Hemoglobin is mildly low but fairly similar to what it was a year ago.  He did have some bloody stools in November but none recently and no current bleeding.  CT abdomen shows no evidence of complication from Crohn's or Crohn's flare at this time.  Discussed that when he was having a bloody stool, that could have been a Crohn's flare but no evidence right now.  Discussed trying PPI as he does note that sometimes food makes his pain worse. Also recommend following up with GI.    Diagnosis:    ICD-10-CM    1. Abdominal pain, generalized  R10.84      Scribe Disclosure:  I, Mary Mack, am serving as a scribe at 12:32 AM on 12/2/2021 to document services personally performed by Nicole Kumar MD based on my observations and the provider's statements to me.       Nicole Kumar MD  12/02/21 1594

## 2022-04-14 ENCOUNTER — OFFICE VISIT (OUTPATIENT)
Dept: PEDIATRICS | Facility: CLINIC | Age: 27
End: 2022-04-14
Payer: COMMERCIAL

## 2022-04-14 VITALS
WEIGHT: 143.3 LBS | SYSTOLIC BLOOD PRESSURE: 120 MMHG | BODY MASS INDEX: 22.49 KG/M2 | DIASTOLIC BLOOD PRESSURE: 77 MMHG | TEMPERATURE: 97.1 F | HEIGHT: 67 IN | OXYGEN SATURATION: 99 % | HEART RATE: 60 BPM

## 2022-04-14 DIAGNOSIS — K50.80 CROHN'S DISEASE OF BOTH SMALL AND LARGE INTESTINE WITHOUT COMPLICATION (H): ICD-10-CM

## 2022-04-14 DIAGNOSIS — Z11.3 ROUTINE SCREENING FOR STI (SEXUALLY TRANSMITTED INFECTION): Primary | ICD-10-CM

## 2022-04-14 PROCEDURE — 87591 N.GONORRHOEAE DNA AMP PROB: CPT | Performed by: NURSE PRACTITIONER

## 2022-04-14 PROCEDURE — 86696 HERPES SIMPLEX TYPE 2 TEST: CPT | Performed by: NURSE PRACTITIONER

## 2022-04-14 PROCEDURE — 87491 CHLMYD TRACH DNA AMP PROBE: CPT | Performed by: NURSE PRACTITIONER

## 2022-04-14 PROCEDURE — 36415 COLL VENOUS BLD VENIPUNCTURE: CPT | Performed by: NURSE PRACTITIONER

## 2022-04-14 PROCEDURE — 99214 OFFICE O/P EST MOD 30 MIN: CPT | Performed by: NURSE PRACTITIONER

## 2022-04-14 PROCEDURE — 87389 HIV-1 AG W/HIV-1&-2 AB AG IA: CPT | Performed by: NURSE PRACTITIONER

## 2022-04-14 PROCEDURE — 86695 HERPES SIMPLEX TYPE 1 TEST: CPT | Performed by: NURSE PRACTITIONER

## 2022-04-14 NOTE — PATIENT INSTRUCTIONS
It was nice seeing you today.    Please let me know if you have any questions regarding today's visit!    Take care,    NUNU Thompson DNP  Family Medicine

## 2022-04-14 NOTE — PROGRESS NOTES
"Assessment & Plan     Routine screening for STI (sexually transmitted infection)  Routine screening done.  Follow-up as needed  - HIV Antigen Antibody Combo  - Herpes Simplex Virus 1 and 2 IgG  - Chlamydia trachomatis/Neisseria gonorrhoeae by PCR; Future  - Chlamydia trachomatis/Neisseria gonorrhoeae by PCR  - NEISSERIA GONORRHOEA PCR  - CHLAMYDIA TRACHOMATIS PCR    Crohn's disease of both small and large intestine without complication (H)  Stable and managed by PCP. Takes medication as directed.        Return if symptoms worsen or fail to improve.    Raya Thompson NP  [facility] JOSE Davila is a 27 year old who presents for the following health issues:    HPI     STI:  -Would like screening for STI  -Denies any exposure    Crohn's:  -Stable and controlled.  Managed by PCP.  Takes medication daily  -No complications or symptoms    Review of Systems   Constitutional, HEENT, cardiovascular, pulmonary, gi and gu systems are negative, except as otherwise noted.      Objective    /77 (BP Location: Right arm, Patient Position: Sitting, Cuff Size: Adult Regular)   Pulse 60   Temp 97.1  F (36.2  C) (Temporal)   Ht 1.702 m (5' 7\")   Wt 65 kg (143 lb 4.8 oz)   SpO2 99%   BMI 22.44 kg/m    Body mass index is 22.44 kg/m .     Physical Exam   GENERAL: healthy, alert and no distress  RESP: lungs clear to auscultation - no rales, rhonchi or wheezes  CV: regular rate and rhythm, normal S1 S2, no S3 or S4, no murmur, click or rub, no peripheral edema and peripheral pulses strong  PSYCH: mentation appears normal, affect normal/bright        "

## 2022-04-15 LAB
HIV 1+2 AB+HIV1 P24 AG SERPL QL IA: NONREACTIVE
HSV1 IGG SERPL QL IA: 51.7 INDEX
HSV1 IGG SERPL QL IA: ABNORMAL
HSV2 IGG SERPL QL IA: 0.28 INDEX
HSV2 IGG SERPL QL IA: ABNORMAL

## 2022-04-16 LAB
C TRACH DNA SPEC QL PROBE+SIG AMP: POSITIVE
N GONORRHOEA DNA SPEC QL NAA+PROBE: NEGATIVE

## 2022-04-18 DIAGNOSIS — A74.9 CHLAMYDIA: Primary | ICD-10-CM

## 2022-04-18 RX ORDER — DOXYCYCLINE 100 MG/1
100 CAPSULE ORAL 2 TIMES DAILY
Qty: 14 CAPSULE | Refills: 0 | Status: SHIPPED | OUTPATIENT
Start: 2022-04-18 | End: 2023-07-27

## 2022-05-08 ENCOUNTER — HEALTH MAINTENANCE LETTER (OUTPATIENT)
Age: 27
End: 2022-05-08

## 2022-08-31 ENCOUNTER — APPOINTMENT (OUTPATIENT)
Dept: GENERAL RADIOLOGY | Facility: CLINIC | Age: 27
End: 2022-08-31
Attending: EMERGENCY MEDICINE
Payer: COMMERCIAL

## 2022-08-31 ENCOUNTER — HOSPITAL ENCOUNTER (EMERGENCY)
Facility: CLINIC | Age: 27
Discharge: HOME | End: 2022-08-31
Attending: EMERGENCY MEDICINE | Admitting: EMERGENCY MEDICINE
Payer: COMMERCIAL

## 2022-08-31 ENCOUNTER — HOSPITAL ENCOUNTER (EMERGENCY)
Facility: CLINIC | Age: 27
Discharge: HOME | End: 2022-08-31
Attending: PHYSICIAN ASSISTANT | Admitting: PHYSICIAN ASSISTANT
Payer: COMMERCIAL

## 2022-08-31 VITALS
OXYGEN SATURATION: 99 % | SYSTOLIC BLOOD PRESSURE: 123 MMHG | HEART RATE: 62 BPM | DIASTOLIC BLOOD PRESSURE: 65 MMHG | RESPIRATION RATE: 18 BRPM | TEMPERATURE: 97.3 F

## 2022-08-31 VITALS
DIASTOLIC BLOOD PRESSURE: 83 MMHG | SYSTOLIC BLOOD PRESSURE: 124 MMHG | TEMPERATURE: 97.7 F | RESPIRATION RATE: 18 BRPM | BODY MASS INDEX: 22.08 KG/M2 | WEIGHT: 141 LBS | HEART RATE: 64 BPM | OXYGEN SATURATION: 97 %

## 2022-08-31 DIAGNOSIS — S63.642A RUPTURE OF ULNAR COLLATERAL LIGAMENT OF THUMB, LEFT, INITIAL ENCOUNTER: ICD-10-CM

## 2022-08-31 DIAGNOSIS — S90.02XA CONTUSION OF LEFT ANKLE, INITIAL ENCOUNTER: ICD-10-CM

## 2022-08-31 DIAGNOSIS — S63.642A RUPTURE OF ULNAR COLLATERAL LIGAMENT OF LEFT THUMB, INITIAL ENCOUNTER: ICD-10-CM

## 2022-08-31 PROCEDURE — 99284 EMERGENCY DEPT VISIT MOD MDM: CPT

## 2022-08-31 PROCEDURE — 73130 X-RAY EXAM OF HAND: CPT | Mod: LT

## 2022-08-31 PROCEDURE — 29125 APPL SHORT ARM SPLINT STATIC: CPT | Mod: FA

## 2022-08-31 PROCEDURE — 73630 X-RAY EXAM OF FOOT: CPT | Mod: LT

## 2022-08-31 PROCEDURE — 73610 X-RAY EXAM OF ANKLE: CPT | Mod: LT

## 2022-08-31 ASSESSMENT — ENCOUNTER SYMPTOMS
WEAKNESS: 0
ARTHRALGIAS: 1
NUMBNESS: 0
NUMBNESS: 0
COLOR CHANGE: 0

## 2022-08-31 ASSESSMENT — ACTIVITIES OF DAILY LIVING (ADL)
ADLS_ACUITY_SCORE: 37
ADLS_ACUITY_SCORE: 37

## 2022-08-31 NOTE — ED PROVIDER NOTES
History   Chief Complaint:  Ankle Pain       HPI   Giovanni Carranza is a 27 year old male who presents with left ankle and foot pain after a door was slammed on it last night. He denies numbness and head injury. He also denies alcohol use, recreation drug use, and past ankle injury. He has not taken any pain medications. Patient is noted to have left thumb tenderness.  He does not recall the mechanism of injury.    Review of Systems   Musculoskeletal: Positive for arthralgias.   Neurological: Negative for numbness.   All other systems reviewed and are negative.    Allergies:  Percocet     Medications:  The patient is not currently taking any prescribed medications.     Past Medical History:     Psoas abscess, right (H)  Crohn's disease of small and large intestines (H)     Past Surgical History:    Ileocecal resection     Family History:    Mother: asthma, diabetes, hypertension    Social History:  The patient presents to the ED alone  PCP: [facility]    Physical Exam     Patient Vitals for the past 24 hrs:   BP Temp Pulse Resp SpO2   08/31/22 1222 123/65 97.3  F (36.3  C) 62 18 99 %       Physical Exam  Constitutional:       Appearance: Normal appearance.   HENT:      Head: Atraumatic.      Right Ear: External ear normal.      Left Ear: External ear normal.      Nose: Nose normal.   Pulmonary:      Effort: Pulmonary effort is normal.   Musculoskeletal:      Cervical back: Neck supple.      Comments: Contusion of the lateral left ankle.  Contusion of the medial right foot just distal to the ankle.  There is laxity of the left thumb ulnar collateral ligament.  Mild tenderness of the first metacarpal phalangeal joint of the left hand.  Distal perfusion normal.   Neurological:      Mental Status: He is alert.           Emergency Department Course   Imaging:  XR Ankle Left 3 Views   Final Result   IMPRESSION: No fracture. The ankle mortise is intact. Mild soft tissue   swelling along the  lateral aspect of the ankle.      IRVIN MULLINS MD            SYSTEM ID:  [id number]      XR Hand Left 3 Views   Final Result   IMPRESSION: No fracture or dislocation. No degenerative changes.      IRVIN MULLINS MD            SYSTEM ID:  [id number]      XR Foot Left 3 Views   Final Result   IMPRESSION: No fracture or dislocation. No degenerative changes.      IRVIN MULLINS MD            SYSTEM ID:  [id number]        Report per radiology     Emergency Department Course:  Reviewed:  I reviewed nursing notes, vitals, past medical history and Care Everywhere    Assessments:  1250 I obtained history and examined the patient as noted above.   1411 I rechecked the patient and explained findings.     Disposition:  The patient was discharged to home.     Impression & Plan   Medical Decision Making:  This patient is a 27-year-old man who presents with an injury to the left ankle and foot.  X-ray does not demonstrate a fracture.  He also complains of pain over the first metacarpal phalangeal joint where he has tenderness and laxity of the ulnar collateral ligament of the left hand.  X-ray here does not demonstrate a fracture as well but I am suspicious for ligament rupture.  He was placed in a thumb spica splint and remains distal neurovascular intact.  He is being referred to the hand trauma follow-up clinic.    Diagnosis:    ICD-10-CM    1. Rupture of ulnar collateral ligament of left thumb, initial encounter  S63.642A    2. Contusion of left ankle, initial encounter  S90.02XA        Scribe Disclosure:  I, Eduardo Aguilar, am serving as a scribe at 12:24 PM on 8/31/2022 to document services personally performed by Casey Melchor, based on my observations and the provider's statements to me.         Casey Melchor MD  08/31/22 7353

## 2022-08-31 NOTE — ED TRIAGE NOTES
Pt presents to ED with left ankle pain. States it got slammed in a door and a door frame yesterday.   Has not taken anything for the pain.

## 2022-08-31 NOTE — DISCHARGE INSTRUCTIONS
Return to the ER for worsening pain, numbness or discoloration of the fingers, or for any new concerns.    Please wear your splint until you follow-up in the orthopedic clinic.  Do not get the splint wet as this can cause it to become deformed.

## 2022-09-01 NOTE — ED PROVIDER NOTES
History     Chief Complaint:  Cast Repair       HPI   Giovanni Carranza is a 27 year old male who presents because he was seen earlier today for left ankle and foot pain and left thumb tenderness.  He was diagnosed with  A ruptured ulnar collateral ligament of his left thumb and contusion of his left ankle.  He had a thumb spica splint placed on his left thumb and then he went home and started using his hand and the splint loosened and started coming apart.  He presents here for us to retighten and fix the splint.  Otherwise he is not having any new numbness or changes in his pain from earlier today.      ROS:  Review of Systems   Musculoskeletal:         thumb pain+   Skin: Negative for color change.   Neurological: Negative for weakness and numbness.   All other systems reviewed and are negative.    Allergies:  Percocet [Oxycodone-Acetaminophen]     Medications:    doxycycline hyclate (VIBRAMYCIN) 100 MG capsule  PENTASA 500 MG CR capsule        Past Medical History:    Past Medical History:   Diagnosis Date     Crohn's disease (H)      Crohn's disease (H)      Patient Active Problem List   Diagnosis     Psoas abscess, right (H)     Crohn's disease of small and large intestines (H)        Past Surgical History:    Past Surgical History:   Procedure Laterality Date     COLONOSCOPY       COMBINED CYSTOSCOPY, INSERT CATHETER URETER Bilateral 3/23/2015    Procedure: COMBINED CYSTOSCOPY, INSERT CATHETER URETER;  Surgeon: Viridiana Castro MD;  Location:  OR     LAPAROSCOPIC RESECTION ILEOCECAL N/A 3/23/2015    Procedure: LAPAROSCOPIC RESECTION ILEOCECAL;  Surgeon: Marya Leon MD;  Location:  OR     LAPAROTOMY EXPLORATORY N/A 3/25/2015    Procedure: LAPAROTOMY EXPLORATORY;  Surgeon: Marya Leon MD;  Location:  OR     OTHER SURGICAL HISTORY  03/23/2015    LAPAROSCOPIC RESECTION ILEOCECAL     OTHER SURGICAL HISTORY  03/23/2015    COMBINED CYSTOSCOPY, INSERT CATHETER URETER     SMALL  INTESTINE SURGERY          Family History:    family history includes No Known Problems in his mother; Unknown/Adopted in his father.    Social History:   reports that he has never smoked. He has never used smokeless tobacco. He reports that he does not drink alcohol and does not use drugs.  PCP: [facility]     Physical Exam     Patient Vitals for the past 24 hrs:   BP Temp Temp src Pulse Resp SpO2 Weight   08/31/22 1959 124/83 97.7  F (36.5  C) Temporal 64 18 97 % 64 kg (141 lb)        Physical Exam  General:Vitals signs reviewed  Psych: Normal Affect  Eyes: Non icteric , non inject  Psych: Normal Affect  Lungs: Non labored breathing  Skin: No pallor.  Warm and well-perfused.  Neuro: Normal mentation, Sensation intact distal to injury. Normal strength of distal left upper extremity.  Vascular: Cap refill < 2 sec distal to injury,   Musculoskeletal:  Left arm wrist and hand in a thumb spica splint.  Thumb spica splint is mildly loosened with some of the cotton padding falling out.      Emergency Department Course       Procedures     Splint Placement     Procedure: Splint Placement     Indication: Left thumb collateral ligament injury    Consent: Verbal     Location: Left L first (thumb) finger    Preparation: Wounds were cleansed and dressed with a non-adherent bandage     Procedure detail:   Splint was applied by Myself  Splint type: Thumb spica   Splint materilal: Fiberglass  After placement I checked and adjusted the fit as needed to ensure proper positioning/fit     Sensation and circulation are intact after splint placement     Patient Status: The patient tolerated the procedure well: Yes. There were no complications.      Emergency Department Course:         Reviewed:  I reviewed nursing notes, vitals and past medical history    Assessments/Consults:          Interventions:  Medications - No data to display     Disposition:  The patient was discharged to home.     Impression &  Plan    CMS Diagnoses: None    Medical Decision Making:    This is a 27-year-old male who presents to the emergency department concern for loosening of his thumb spica splint that was placed for his left thumb injury.  Patient was evaluated and was neurovascularly intact.  Splint was prefixed and padding was replaced with new Ace wrap bandage.  Reports if it was felt right and was not too tight or too loose.  He will follow-up as instructed prior to [facility] with hand specialty.  He should return back to the emergency department if he notes any increasing pain numbness pallor or worsening condition of his left upper extremity.  I have a strong advised the patient not to use his left upper extremity for any lifting or grabbing to avoid injuring his thumb third further and potential injury in the splint.    My impression of today's diagnosis is:     ICD-10-CM    1. Rupture of ulnar collateral ligament of thumb, left, initial encounter  S63.642A            Discharge Medications:  New Prescriptions    No medications on file        8/31/2022   Brayan Fernandez, Brayan Manzano PA-C  08/31/22 2049

## 2022-09-21 ENCOUNTER — HOSPITAL ENCOUNTER (OUTPATIENT)
Facility: CLINIC | Age: 27
End: 2022-09-21
Payer: COMMERCIAL

## 2022-09-22 ENCOUNTER — TELEPHONE (OUTPATIENT)
Dept: MEDSURG UNIT | Facility: CLINIC | Age: 27
End: 2022-09-22

## 2023-04-23 ENCOUNTER — HEALTH MAINTENANCE LETTER (OUTPATIENT)
Age: 28
End: 2023-04-23

## 2023-05-08 ENCOUNTER — ANCILLARY ORDERS (OUTPATIENT)
Dept: MRI IMAGING | Facility: CLINIC | Age: 28
End: 2023-05-08

## 2023-05-08 DIAGNOSIS — K50.00 CROHN'S DISEASE OF SMALL INTESTINE WITHOUT COMPLICATION (H): ICD-10-CM

## 2023-05-22 ENCOUNTER — HOSPITAL ENCOUNTER (OUTPATIENT)
Dept: MRI IMAGING | Facility: CLINIC | Age: 28
Discharge: HOME | End: 2023-05-22
Attending: INTERNAL MEDICINE
Payer: COMMERCIAL

## 2023-05-22 ENCOUNTER — HOSPITAL ENCOUNTER (OUTPATIENT)
Facility: CLINIC | Age: 28
Discharge: HOME | End: 2023-05-22
Admitting: RADIOLOGY
Payer: COMMERCIAL

## 2023-05-22 VITALS — DIASTOLIC BLOOD PRESSURE: 61 MMHG | SYSTOLIC BLOOD PRESSURE: 113 MMHG

## 2023-05-22 DIAGNOSIS — K50.00 CROHN'S DISEASE OF SMALL INTESTINE WITHOUT COMPLICATION (H): ICD-10-CM

## 2023-05-22 PROCEDURE — 250N000011 HC RX IP 250 OP 636: Performed by: INTERNAL MEDICINE

## 2023-05-22 PROCEDURE — 255N000002 HC RX 255 OP 636: Performed by: INTERNAL MEDICINE

## 2023-05-22 PROCEDURE — 72197 MRI PELVIS W/O & W/DYE: CPT

## 2023-05-22 PROCEDURE — A9585 GADOBUTROL INJECTION: HCPCS | Performed by: INTERNAL MEDICINE

## 2023-05-22 PROCEDURE — 999N000154 HC STATISTIC RADIOLOGY XRAY, US, CT, MAR, NM

## 2023-05-22 PROCEDURE — 96374 THER/PROPH/DIAG INJ IV PUSH: CPT

## 2023-05-22 RX ORDER — GADOBUTROL 604.72 MG/ML
7 INJECTION INTRAVENOUS ONCE
Status: COMPLETED | OUTPATIENT
Start: 2023-05-22 | End: 2023-05-22

## 2023-05-22 RX ADMIN — GLUCAGON 0.5 MG: 1 INJECTION, POWDER, LYOPHILIZED, FOR SOLUTION INTRAMUSCULAR; INTRAVENOUS at 12:38

## 2023-05-22 RX ADMIN — GADOBUTROL 7 ML: 604.72 INJECTION INTRAVENOUS at 11:39

## 2023-05-22 RX ADMIN — GLUCAGON 0.5 MG: 1 INJECTION, POWDER, LYOPHILIZED, FOR SOLUTION INTRAMUSCULAR; INTRAVENOUS at 12:22

## 2023-05-22 ASSESSMENT — ACTIVITIES OF DAILY LIVING (ADL)
ADLS_ACUITY_SCORE: 37
ADLS_ACUITY_SCORE: 37

## 2023-05-22 NOTE — PROGRESS NOTES
1229 MRI enterography. /69 pre glucagon. Pt received 0.5 mg IV slow push, post /67. Pt states no nausea post medication.  1243 /62, second dose of glucagon given. /64. Pt had no nausea or other symptoms, post /72.  1250 /61 No nausea  1254 MRI complete, no nausea /58.

## 2023-06-02 ENCOUNTER — HEALTH MAINTENANCE LETTER (OUTPATIENT)
Age: 28
End: 2023-06-02

## 2023-07-27 ENCOUNTER — OFFICE VISIT (OUTPATIENT)
Dept: FAMILY MEDICINE | Facility: CLINIC | Age: 28
End: 2023-07-27
Payer: COMMERCIAL

## 2023-07-27 VITALS
RESPIRATION RATE: 18 BRPM | TEMPERATURE: 97.7 F | BODY MASS INDEX: 22.03 KG/M2 | DIASTOLIC BLOOD PRESSURE: 68 MMHG | HEIGHT: 67 IN | HEART RATE: 62 BPM | SYSTOLIC BLOOD PRESSURE: 113 MMHG | OXYGEN SATURATION: 100 % | WEIGHT: 140.4 LBS

## 2023-07-27 DIAGNOSIS — R31.29 MICROSCOPIC HEMATURIA: Primary | ICD-10-CM

## 2023-07-27 DIAGNOSIS — Z11.3 SCREEN FOR STD (SEXUALLY TRANSMITTED DISEASE): Primary | ICD-10-CM

## 2023-07-27 DIAGNOSIS — R35.0 URINARY FREQUENCY: ICD-10-CM

## 2023-07-27 LAB
ALBUMIN UR-MCNC: ABNORMAL MG/DL
APPEARANCE UR: CLEAR
BACTERIA #/AREA URNS HPF: ABNORMAL /HPF
BILIRUB UR QL STRIP: NEGATIVE
COLOR UR AUTO: YELLOW
GLUCOSE UR STRIP-MCNC: NEGATIVE MG/DL
HBA1C MFR BLD: 5.5 % (ref 0–5.6)
HGB UR QL STRIP: ABNORMAL
KETONES UR STRIP-MCNC: NEGATIVE MG/DL
LEUKOCYTE ESTERASE UR QL STRIP: NEGATIVE
NITRATE UR QL: NEGATIVE
PH UR STRIP: 7 [PH] (ref 5–7)
RBC #/AREA URNS AUTO: ABNORMAL /HPF
SP GR UR STRIP: 1.01 (ref 1–1.03)
SQUAMOUS #/AREA URNS AUTO: ABNORMAL /LPF
UROBILINOGEN UR STRIP-ACNC: 0.2 E.U./DL
WBC #/AREA URNS AUTO: ABNORMAL /HPF

## 2023-07-27 PROCEDURE — 83036 HEMOGLOBIN GLYCOSYLATED A1C: CPT | Performed by: PHYSICIAN ASSISTANT

## 2023-07-27 PROCEDURE — 81001 URINALYSIS AUTO W/SCOPE: CPT | Performed by: PHYSICIAN ASSISTANT

## 2023-07-27 PROCEDURE — 87389 HIV-1 AG W/HIV-1&-2 AB AG IA: CPT | Performed by: PHYSICIAN ASSISTANT

## 2023-07-27 PROCEDURE — 87491 CHLMYD TRACH DNA AMP PROBE: CPT | Performed by: PHYSICIAN ASSISTANT

## 2023-07-27 PROCEDURE — 36415 COLL VENOUS BLD VENIPUNCTURE: CPT | Performed by: PHYSICIAN ASSISTANT

## 2023-07-27 PROCEDURE — 86780 TREPONEMA PALLIDUM: CPT | Performed by: PHYSICIAN ASSISTANT

## 2023-07-27 PROCEDURE — 86696 HERPES SIMPLEX TYPE 2 TEST: CPT | Performed by: PHYSICIAN ASSISTANT

## 2023-07-27 PROCEDURE — 99213 OFFICE O/P EST LOW 20 MIN: CPT | Performed by: PHYSICIAN ASSISTANT

## 2023-07-27 PROCEDURE — 87591 N.GONORRHOEAE DNA AMP PROB: CPT | Performed by: PHYSICIAN ASSISTANT

## 2023-07-27 PROCEDURE — 86803 HEPATITIS C AB TEST: CPT | Performed by: PHYSICIAN ASSISTANT

## 2023-07-27 PROCEDURE — 86695 HERPES SIMPLEX TYPE 1 TEST: CPT | Performed by: PHYSICIAN ASSISTANT

## 2023-07-27 ASSESSMENT — PAIN SCALES - GENERAL: PAINLEVEL: NO PAIN (0)

## 2023-07-27 NOTE — RESULT ENCOUNTER NOTE
Rosendo Davila,    Here are your urine results which show some blood in the urine.  There aren't signs of infection.  I'd like to recheck your urine in about one month to ensure the blood resolves.  Please make an appointment for this in one month (just a lab visit) by calling the clinic at [phone].    Your screening test for diabetes was negative.     Please let us know if you have any questions or concerns.    Regards,  Alaina Barraza PA-C

## 2023-07-27 NOTE — PROGRESS NOTES
"Assessment and Plan:     (Z11.3) Screen for STD (sexually transmitted disease)  (primary encounter diagnosis)  Comment: new partner last week, no dysuria, has had some frequency, no sores, no known exposures   Plan: CHLAMYDIA TRACHOMATIS PCR, NEISSERIA GONORRHOEA        PCR, HIV Antigen Antibody Combo, Hepatitis C         Screen Reflex to HCV RNA Quant and Genotype,         Treponema Abs w Reflex to RPR and Titer, Herpes        Simplex Virus 1 and 2 IgG  Discussed retesting in about 3 months if concerned     (R35.0) Urinary frequency  Comment: no dysuria, no back pain, no polydipsia   Plan: UA with Microscopic reflex to Culture - lab         collect, Hemoglobin A1c, UA Microscopic with         Reflex to Culture      KATELYNN Yousif-    Subjective   Giovanni is a 28 year old, presenting for the following health issues:  LAB REQUEST (STI)      HPI     Giovanni would like to be tested for STDs  He had a new partner last week and since then has noticed that he has been urinating more frequently  He denies dysuria, urgency, fever/chills, urethral discharge, penile sores/lesions    Review of Systems   See above      Objective      /68 (BP Location: Left arm, Patient Position: Sitting, Cuff Size: Adult Regular)   Pulse 62   Temp 97.7  F (36.5  C) (Oral)   Resp 18   Ht 1.702 m (5' 7\")   Wt 63.7 kg (140 lb 6.4 oz)   SpO2 100%   BMI 21.99 kg/m        Physical Exam     GENERAL: healthy, alert and no distress  RESP: lungs clear to auscultation - no rales, no rhonchi, no wheezes  CV: regular rates and rhythm, normal S1 S2, no S3 or S4 and no murmur, no click or rub                   "

## 2023-07-28 ENCOUNTER — TELEPHONE (OUTPATIENT)
Dept: FAMILY MEDICINE | Facility: CLINIC | Age: 28
End: 2023-07-28
Payer: COMMERCIAL

## 2023-07-28 LAB — HIV 1+2 AB+HIV1 P24 AG SERPL QL IA: NONREACTIVE

## 2023-07-28 NOTE — RESULT ENCOUNTER NOTE
Team - please CALL patient with results.  His HIV test was negative.  Please have him schedule a repeat lab test for urine check in about one month to ensure blood in urine has resolved.  Thanks.

## 2023-07-28 NOTE — TELEPHONE ENCOUNTER
----- Message from Alaina Barraza PA-C sent at 7/28/2023  4:10 PM CDT -----  Team - please CALL patient with results.  His HIV test was negative.  Please have him schedule a repeat lab test for urine check in about one month to ensure blood in urine has resolved.  Thanks.

## 2023-07-28 NOTE — TELEPHONE ENCOUNTER
Writer called and spoke with patient and reviewed result note from Alaina PAGE. Patient expressed verbal understanding and is agreeable. Writer scheduled patient for lab appointment in one month per instructions from provider:    9/1/2023 3:00 PM CS LAB [facility]      No further questions or concerns at this time.    Signing encounter.    Jasmyn Kidd RN  [facility]

## 2023-07-29 LAB
C TRACH DNA SPEC QL NAA+PROBE: NEGATIVE
HCV AB SERPL QL IA: NONREACTIVE
HSV1 IGG SERPL QL IA: 54.8 INDEX
HSV1 IGG SERPL QL IA: ABNORMAL
HSV2 IGG SERPL QL IA: 0.14 INDEX
HSV2 IGG SERPL QL IA: ABNORMAL
N GONORRHOEA DNA SPEC QL NAA+PROBE: NEGATIVE
T PALLIDUM AB SER QL: NONREACTIVE

## 2023-08-01 NOTE — RESULT ENCOUNTER NOTE
Rosendo Davila,    Here are the rest of your recent results:  Chlamydia and gonorrhea tests were negative  Hepatitis C test was negative  Syphilis test was negative   Test for herpes simplex virus 1 was positive (this is the virus that typically causes cold sores)  Test for herpes simplex virus 2 was negative (this is the virus that typically causes genital herpes)      Please let us know if you have any questions or concerns.    Regards,  Alaina Barraza PA-C

## 2024-01-27 ENCOUNTER — APPOINTMENT (OUTPATIENT)
Dept: CT IMAGING | Facility: CLINIC | Age: 29
End: 2024-01-27
Attending: EMERGENCY MEDICINE
Payer: COMMERCIAL

## 2024-01-27 ENCOUNTER — HOSPITAL ENCOUNTER (INPATIENT)
Facility: CLINIC | Age: 29
LOS: 4 days | Discharge: HOME | End: 2024-01-31
Attending: EMERGENCY MEDICINE | Admitting: INTERNAL MEDICINE
Payer: COMMERCIAL

## 2024-01-27 DIAGNOSIS — K50.818 CROHN'S DISEASE OF BOTH SMALL AND LARGE INTESTINE WITH OTHER COMPLICATION (H): ICD-10-CM

## 2024-01-27 DIAGNOSIS — K50.919 CROHN'S DISEASE WITH COMPLICATION, UNSPECIFIED GASTROINTESTINAL TRACT LOCATION (H): ICD-10-CM

## 2024-01-27 DIAGNOSIS — R10.31 RIGHT LOWER QUADRANT ABDOMINAL PAIN: Primary | ICD-10-CM

## 2024-01-27 DIAGNOSIS — K50.80 CROHN'S DISEASE OF BOTH SMALL AND LARGE INTESTINE WITHOUT COMPLICATION (H): ICD-10-CM

## 2024-01-27 LAB
ALBUMIN SERPL BCG-MCNC: 4.2 G/DL (ref 3.5–5.2)
ALP SERPL-CCNC: 57 U/L (ref 40–150)
ALT SERPL W P-5'-P-CCNC: 19 U/L (ref 0–70)
ANION GAP SERPL CALCULATED.3IONS-SCNC: 10 MMOL/L (ref 7–15)
AST SERPL W P-5'-P-CCNC: 24 U/L (ref 0–45)
BASOPHILS # BLD AUTO: 0 10E3/UL (ref 0–0.2)
BASOPHILS NFR BLD AUTO: 0 %
BILIRUB SERPL-MCNC: 0.3 MG/DL
BUN SERPL-MCNC: 13.2 MG/DL (ref 6–20)
CALCIUM SERPL-MCNC: 9.1 MG/DL (ref 8.6–10)
CHLORIDE SERPL-SCNC: 101 MMOL/L (ref 98–107)
CREAT SERPL-MCNC: 1.2 MG/DL (ref 0.67–1.17)
DEPRECATED HCO3 PLAS-SCNC: 26 MMOL/L (ref 22–29)
EGFRCR SERPLBLD CKD-EPI 2021: 85 ML/MIN/1.73M2
EOSINOPHIL # BLD AUTO: 0 10E3/UL (ref 0–0.7)
EOSINOPHIL NFR BLD AUTO: 0 %
ERYTHROCYTE [DISTWIDTH] IN BLOOD BY AUTOMATED COUNT: 16.5 % (ref 10–15)
GLUCOSE SERPL-MCNC: 88 MG/DL (ref 70–99)
HCT VFR BLD AUTO: 39.5 % (ref 40–53)
HGB BLD-MCNC: 12.2 G/DL (ref 13.3–17.7)
IMM GRANULOCYTES # BLD: 0 10E3/UL
IMM GRANULOCYTES NFR BLD: 0 %
LIPASE SERPL-CCNC: 31 U/L (ref 13–60)
LYMPHOCYTES # BLD AUTO: 1.4 10E3/UL (ref 0.8–5.3)
LYMPHOCYTES NFR BLD AUTO: 14 %
MCH RBC QN AUTO: 25 PG (ref 26.5–33)
MCHC RBC AUTO-ENTMCNC: 30.9 G/DL (ref 31.5–36.5)
MCV RBC AUTO: 81 FL (ref 78–100)
MONOCYTES # BLD AUTO: 0.5 10E3/UL (ref 0–1.3)
MONOCYTES NFR BLD AUTO: 5 %
NEUTROPHILS # BLD AUTO: 7.8 10E3/UL (ref 1.6–8.3)
NEUTROPHILS NFR BLD AUTO: 81 %
NRBC # BLD AUTO: 0 10E3/UL
NRBC BLD AUTO-RTO: 0 /100
PLATELET # BLD AUTO: 279 10E3/UL (ref 150–450)
POTASSIUM SERPL-SCNC: 4 MMOL/L (ref 3.4–5.3)
PROT SERPL-MCNC: 7.9 G/DL (ref 6.4–8.3)
RBC # BLD AUTO: 4.88 10E6/UL (ref 4.4–5.9)
SODIUM SERPL-SCNC: 137 MMOL/L (ref 135–145)
WBC # BLD AUTO: 9.7 10E3/UL (ref 4–11)

## 2024-01-27 PROCEDURE — 85025 COMPLETE CBC W/AUTO DIFF WBC: CPT | Performed by: EMERGENCY MEDICINE

## 2024-01-27 PROCEDURE — 80053 COMPREHEN METABOLIC PANEL: CPT | Performed by: EMERGENCY MEDICINE

## 2024-01-27 PROCEDURE — 250N000009 HC RX 250: Performed by: EMERGENCY MEDICINE

## 2024-01-27 PROCEDURE — 36415 COLL VENOUS BLD VENIPUNCTURE: CPT | Performed by: EMERGENCY MEDICINE

## 2024-01-27 PROCEDURE — 86140 C-REACTIVE PROTEIN: CPT | Performed by: INTERNAL MEDICINE

## 2024-01-27 PROCEDURE — 120N000001 HC R&B MED SURG/OB

## 2024-01-27 PROCEDURE — 99285 EMERGENCY DEPT VISIT HI MDM: CPT | Mod: 25

## 2024-01-27 PROCEDURE — 83690 ASSAY OF LIPASE: CPT | Performed by: EMERGENCY MEDICINE

## 2024-01-27 PROCEDURE — 74177 CT ABD & PELVIS W/CONTRAST: CPT

## 2024-01-27 PROCEDURE — 99222 1ST HOSP IP/OBS MODERATE 55: CPT | Mod: AI | Performed by: INTERNAL MEDICINE

## 2024-01-27 PROCEDURE — 87040 BLOOD CULTURE FOR BACTERIA: CPT | Performed by: EMERGENCY MEDICINE

## 2024-01-27 PROCEDURE — 258N000003 HC RX IP 258 OP 636: Performed by: EMERGENCY MEDICINE

## 2024-01-27 PROCEDURE — 250N000011 HC RX IP 250 OP 636: Performed by: EMERGENCY MEDICINE

## 2024-01-27 RX ORDER — PIPERACILLIN SODIUM, TAZOBACTAM SODIUM 4; .5 G/20ML; G/20ML
4.5 INJECTION, POWDER, LYOPHILIZED, FOR SOLUTION INTRAVENOUS ONCE
Status: COMPLETED | OUTPATIENT
Start: 2024-01-27 | End: 2024-01-27

## 2024-01-27 RX ORDER — IOPAMIDOL 755 MG/ML
69 INJECTION, SOLUTION INTRAVASCULAR ONCE
Status: COMPLETED | OUTPATIENT
Start: 2024-01-27 | End: 2024-01-27

## 2024-01-27 RX ADMIN — SODIUM CHLORIDE 60 ML: 9 INJECTION, SOLUTION INTRAVENOUS at 21:24

## 2024-01-27 RX ADMIN — IOPAMIDOL 69 ML: 755 INJECTION, SOLUTION INTRAVENOUS at 21:23

## 2024-01-27 RX ADMIN — PIPERACILLIN AND TAZOBACTAM 4.5 G: 4; .5 INJECTION, POWDER, FOR SOLUTION INTRAVENOUS at 22:24

## 2024-01-27 RX ADMIN — SODIUM CHLORIDE, POTASSIUM CHLORIDE, SODIUM LACTATE AND CALCIUM CHLORIDE 1000 ML: 600; 310; 30; 20 INJECTION, SOLUTION INTRAVENOUS at 22:06

## 2024-01-27 ASSESSMENT — ENCOUNTER SYMPTOMS
FEVER: 0
BLOOD IN STOOL: 0
NECK PAIN: 0
VOMITING: 0
RHINORRHEA: 0
CHILLS: 0
SHORTNESS OF BREATH: 0
ABDOMINAL PAIN: 1
SORE THROAT: 0
NAUSEA: 0
DIARRHEA: 0
DYSURIA: 0
WEAKNESS: 0
HEADACHES: 0
COUGH: 0

## 2024-01-27 ASSESSMENT — ACTIVITIES OF DAILY LIVING (ADL)
ADLS_ACUITY_SCORE: 37
ADLS_ACUITY_SCORE: 37

## 2024-01-27 NOTE — LETTER
[facility]  [address] [address]  Phone: [phone]  Fax: [phone]    January 31, 2024        Giovanni ALAS Dillon  [address] [address]          To whom it may concern:    RE: Giovanni Carranza    Giovanni was hospitalized 1/27/24 - 1/31/24 and missed work during these dates.     He may return to work on or after 2/1/24 without restrictions    Your understanding is appreciated.          Sincerely,    DANIKA UNGER M.D.

## 2024-01-28 LAB
ANION GAP SERPL CALCULATED.3IONS-SCNC: 8 MMOL/L (ref 7–15)
BUN SERPL-MCNC: 11.9 MG/DL (ref 6–20)
CALCIUM SERPL-MCNC: 8.6 MG/DL (ref 8.6–10)
CHLORIDE SERPL-SCNC: 102 MMOL/L (ref 98–107)
CREAT SERPL-MCNC: 1.19 MG/DL (ref 0.67–1.17)
CRP SERPL-MCNC: 17.21 MG/L
DEPRECATED HCO3 PLAS-SCNC: 26 MMOL/L (ref 22–29)
EGFRCR SERPLBLD CKD-EPI 2021: 86 ML/MIN/1.73M2
ERYTHROCYTE [DISTWIDTH] IN BLOOD BY AUTOMATED COUNT: 16.4 % (ref 10–15)
GLUCOSE SERPL-MCNC: 98 MG/DL (ref 70–99)
HCT VFR BLD AUTO: 39.5 % (ref 40–53)
HGB BLD-MCNC: 11.9 G/DL (ref 13.3–17.7)
MCH RBC QN AUTO: 24.5 PG (ref 26.5–33)
MCHC RBC AUTO-ENTMCNC: 30.1 G/DL (ref 31.5–36.5)
MCV RBC AUTO: 81 FL (ref 78–100)
PLATELET # BLD AUTO: 211 10E3/UL (ref 150–450)
POTASSIUM SERPL-SCNC: 3.8 MMOL/L (ref 3.4–5.3)
RBC # BLD AUTO: 4.85 10E6/UL (ref 4.4–5.9)
SODIUM SERPL-SCNC: 136 MMOL/L (ref 135–145)
WBC # BLD AUTO: 8.2 10E3/UL (ref 4–11)

## 2024-01-28 PROCEDURE — 36415 COLL VENOUS BLD VENIPUNCTURE: CPT | Performed by: INTERNAL MEDICINE

## 2024-01-28 PROCEDURE — 99232 SBSQ HOSP IP/OBS MODERATE 35: CPT | Performed by: INTERNAL MEDICINE

## 2024-01-28 PROCEDURE — 250N000013 HC RX MED GY IP 250 OP 250 PS 637: Performed by: INTERNAL MEDICINE

## 2024-01-28 PROCEDURE — 120N000001 HC R&B MED SURG/OB

## 2024-01-28 PROCEDURE — 85027 COMPLETE CBC AUTOMATED: CPT | Performed by: INTERNAL MEDICINE

## 2024-01-28 PROCEDURE — 250N000011 HC RX IP 250 OP 636: Performed by: INTERNAL MEDICINE

## 2024-01-28 PROCEDURE — 82374 ASSAY BLOOD CARBON DIOXIDE: CPT | Performed by: INTERNAL MEDICINE

## 2024-01-28 PROCEDURE — 258N000003 HC RX IP 258 OP 636: Performed by: INTERNAL MEDICINE

## 2024-01-28 RX ORDER — ACETAMINOPHEN 650 MG/1
650 SUPPOSITORY RECTAL EVERY 4 HOURS PRN
Status: DISCONTINUED | OUTPATIENT
Start: 2024-01-28 | End: 2024-01-31 | Stop reason: HOSPADM

## 2024-01-28 RX ORDER — NALOXONE HYDROCHLORIDE 0.4 MG/ML
0.4 INJECTION, SOLUTION INTRAMUSCULAR; INTRAVENOUS; SUBCUTANEOUS
Status: DISCONTINUED | OUTPATIENT
Start: 2024-01-28 | End: 2024-01-31 | Stop reason: HOSPADM

## 2024-01-28 RX ORDER — HYDROMORPHONE HCL IN WATER/PF 6 MG/30 ML
0.2 PATIENT CONTROLLED ANALGESIA SYRINGE INTRAVENOUS
Status: DISCONTINUED | OUTPATIENT
Start: 2024-01-28 | End: 2024-01-31 | Stop reason: HOSPADM

## 2024-01-28 RX ORDER — ENOXAPARIN SODIUM 100 MG/ML
40 INJECTION SUBCUTANEOUS EVERY 24 HOURS
Status: DISCONTINUED | OUTPATIENT
Start: 2024-01-28 | End: 2024-01-31 | Stop reason: HOSPADM

## 2024-01-28 RX ORDER — PIPERACILLIN SODIUM, TAZOBACTAM SODIUM 3; .375 G/15ML; G/15ML
3.38 INJECTION, POWDER, LYOPHILIZED, FOR SOLUTION INTRAVENOUS EVERY 6 HOURS
Status: DISCONTINUED | OUTPATIENT
Start: 2024-01-28 | End: 2024-01-31

## 2024-01-28 RX ORDER — SODIUM CHLORIDE, SODIUM LACTATE, POTASSIUM CHLORIDE, CALCIUM CHLORIDE 600; 310; 30; 20 MG/100ML; MG/100ML; MG/100ML; MG/100ML
INJECTION, SOLUTION INTRAVENOUS CONTINUOUS
Status: DISCONTINUED | OUTPATIENT
Start: 2024-01-28 | End: 2024-01-29

## 2024-01-28 RX ORDER — ACETAMINOPHEN 325 MG/1
650 TABLET ORAL EVERY 4 HOURS PRN
Status: DISCONTINUED | OUTPATIENT
Start: 2024-01-28 | End: 2024-01-31 | Stop reason: HOSPADM

## 2024-01-28 RX ORDER — HYDROMORPHONE HYDROCHLORIDE 2 MG/1
2 TABLET ORAL
Status: DISCONTINUED | OUTPATIENT
Start: 2024-01-28 | End: 2024-01-31 | Stop reason: HOSPADM

## 2024-01-28 RX ORDER — HYDROMORPHONE HCL IN WATER/PF 6 MG/30 ML
0.4 PATIENT CONTROLLED ANALGESIA SYRINGE INTRAVENOUS
Status: DISCONTINUED | OUTPATIENT
Start: 2024-01-28 | End: 2024-01-31 | Stop reason: HOSPADM

## 2024-01-28 RX ORDER — LIDOCAINE 40 MG/G
CREAM TOPICAL
Status: DISCONTINUED | OUTPATIENT
Start: 2024-01-28 | End: 2024-01-31 | Stop reason: HOSPADM

## 2024-01-28 RX ORDER — PROCHLORPERAZINE MALEATE 10 MG
10 TABLET ORAL EVERY 6 HOURS PRN
Status: DISCONTINUED | OUTPATIENT
Start: 2024-01-28 | End: 2024-01-31 | Stop reason: HOSPADM

## 2024-01-28 RX ORDER — ONDANSETRON 4 MG/1
4 TABLET, ORALLY DISINTEGRATING ORAL EVERY 6 HOURS PRN
Status: DISCONTINUED | OUTPATIENT
Start: 2024-01-28 | End: 2024-01-31 | Stop reason: HOSPADM

## 2024-01-28 RX ORDER — HYDRALAZINE HYDROCHLORIDE 20 MG/ML
10 INJECTION INTRAMUSCULAR; INTRAVENOUS EVERY 4 HOURS PRN
Status: DISCONTINUED | OUTPATIENT
Start: 2024-01-28 | End: 2024-01-31 | Stop reason: HOSPADM

## 2024-01-28 RX ORDER — OXYCODONE HYDROCHLORIDE 5 MG/1
5 TABLET ORAL EVERY 4 HOURS PRN
Status: DISCONTINUED | OUTPATIENT
Start: 2024-01-28 | End: 2024-01-28

## 2024-01-28 RX ORDER — NALOXONE HYDROCHLORIDE 0.4 MG/ML
0.2 INJECTION, SOLUTION INTRAMUSCULAR; INTRAVENOUS; SUBCUTANEOUS
Status: DISCONTINUED | OUTPATIENT
Start: 2024-01-28 | End: 2024-01-31 | Stop reason: HOSPADM

## 2024-01-28 RX ORDER — HYDRALAZINE HYDROCHLORIDE 10 MG/1
10 TABLET, FILM COATED ORAL EVERY 4 HOURS PRN
Status: DISCONTINUED | OUTPATIENT
Start: 2024-01-28 | End: 2024-01-31 | Stop reason: HOSPADM

## 2024-01-28 RX ORDER — PROCHLORPERAZINE 25 MG
25 SUPPOSITORY, RECTAL RECTAL EVERY 12 HOURS PRN
Status: DISCONTINUED | OUTPATIENT
Start: 2024-01-28 | End: 2024-01-31 | Stop reason: HOSPADM

## 2024-01-28 RX ORDER — ONDANSETRON 2 MG/ML
4 INJECTION INTRAMUSCULAR; INTRAVENOUS EVERY 6 HOURS PRN
Status: DISCONTINUED | OUTPATIENT
Start: 2024-01-28 | End: 2024-01-31 | Stop reason: HOSPADM

## 2024-01-28 RX ADMIN — ENOXAPARIN SODIUM 40 MG: 40 INJECTION SUBCUTANEOUS at 08:21

## 2024-01-28 RX ADMIN — SODIUM CHLORIDE, POTASSIUM CHLORIDE, SODIUM LACTATE AND CALCIUM CHLORIDE: 600; 310; 30; 20 INJECTION, SOLUTION INTRAVENOUS at 01:04

## 2024-01-28 RX ADMIN — PIPERACILLIN AND TAZOBACTAM 3.38 G: 3; .375 INJECTION, POWDER, FOR SOLUTION INTRAVENOUS at 10:42

## 2024-01-28 RX ADMIN — PIPERACILLIN AND TAZOBACTAM 3.38 G: 3; .375 INJECTION, POWDER, FOR SOLUTION INTRAVENOUS at 04:01

## 2024-01-28 RX ADMIN — HYDROMORPHONE HYDROCHLORIDE 0.2 MG: 0.2 INJECTION, SOLUTION INTRAMUSCULAR; INTRAVENOUS; SUBCUTANEOUS at 00:58

## 2024-01-28 RX ADMIN — PIPERACILLIN AND TAZOBACTAM 3.38 G: 3; .375 INJECTION, POWDER, FOR SOLUTION INTRAVENOUS at 16:15

## 2024-01-28 RX ADMIN — HYDROMORPHONE HYDROCHLORIDE 2 MG: 2 TABLET ORAL at 04:40

## 2024-01-28 RX ADMIN — ONDANSETRON 4 MG: 4 TABLET, ORALLY DISINTEGRATING ORAL at 01:01

## 2024-01-28 RX ADMIN — SODIUM CHLORIDE, POTASSIUM CHLORIDE, SODIUM LACTATE AND CALCIUM CHLORIDE: 600; 310; 30; 20 INJECTION, SOLUTION INTRAVENOUS at 11:14

## 2024-01-28 RX ADMIN — SODIUM CHLORIDE, POTASSIUM CHLORIDE, SODIUM LACTATE AND CALCIUM CHLORIDE: 600; 310; 30; 20 INJECTION, SOLUTION INTRAVENOUS at 22:25

## 2024-01-28 RX ADMIN — HYDROMORPHONE HYDROCHLORIDE 2 MG: 2 TABLET ORAL at 21:52

## 2024-01-28 RX ADMIN — PIPERACILLIN AND TAZOBACTAM 3.38 G: 3; .375 INJECTION, POWDER, FOR SOLUTION INTRAVENOUS at 21:52

## 2024-01-28 ASSESSMENT — ACTIVITIES OF DAILY LIVING (ADL)
ADLS_ACUITY_SCORE: 37

## 2024-01-28 NOTE — ED TRIAGE NOTES
Pt reports intermittent RLQ pain for past two weeks with nausea. Hx of crohns with abscesses near pelvis years ago. Denies dysuria.      Triage Assessment (Adult)       Row Name 01/27/24 1929          Respiratory WDL    Respiratory WDL WDL        Cardiac WDL    Cardiac WDL WDL        Cognitive/Neuro/Behavioral WDL    Cognitive/Neuro/Behavioral WDL WDL

## 2024-01-28 NOTE — H&P
[facility]    History and Physical - Hospitalist Service       Date of Admission:  1/27/2024    Assessment & Plan   Giovanni Carranza is a 28 year-old male with past medical history including Crohn's disease with ileocecal resection who presents with RLQ pain, nausea. Admitted on 1/27/2024.     Crohn's disease  S/p ileocecal resection 3/2015  *Presents with 2 week RLQ pain, intermittent nausea.   *CT abd/pelvis with indeterminate 1.6 cm soft tissue attenuation focus along the bowel anastomosis margin in RLQ.   *Discussed with general surgery in ED who recommended GI consult in addition to surgery consult  - [facility] and general surgery consulted   - Continue piperacillin-tazobactam IV   - Clear liquid diet as tolerated  - Anti-emetics PRN  - Hydromorphone IV/PO PRN   - Continue IVF       Diet: Combination Diet Clear Liquid   DVT Prophylaxis: Enoxaparin (Lovenox) SQ  Singer Catheter: Not present  Code Status: Full Code     Disposition Plan   Admit to inpatient. Anticipate greater than or equal to 2 midnights prior to discharge.      Entered: Tim Hwang MD 01/28/2024, 1:55 AM     The patient's care was discussed with the ED provider, patient and bedside RN       Clinically Significant Risk Factors Present on Admission                                     Tim Hwang MD  [facility]    ______________________________________________________________________    Chief Complaint   Abdominal pain, nausea    History of Present Illness   Giovanni Carranza is a 28 year old male who presents with the above chief complaint.    History is obtained from the patient, discussion with ED provider and review of medical record.  The patient reports for about the last 2 weeks he has had cramping right lower quadrant pain that is intermittently severe.  He has had a few episodes of poor appetite and nausea during this time.  He was able to eat a burrito on the day of presentation and  had a normal, formed, bowel movement on the day of presentation.  He has a history of Crohn's disease, reports this past year he was taken off of his mesalamine as he had a colonoscopy with no disease. He follows with Dr. Alarcon of [facility].  He denies any fevers or chills.     In the Emergency Department, the patient was seen by Dr. Albert, with whom I discussed the patient's presenting symptoms and emergency department course.  Initial vital signs were a temperature of 98.9F, HR 84, /72, RR 16, SpO2 98% on room air. Pertinent work-up as noted in A&P. The patient received piperacillin-tazobactam IV, 1L LR and Hospitalists were contacted for admission to the hospital.     Past Medical History    I have reviewed this patient's medical history and updated it with pertinent information if needed.   Past Medical History:   Diagnosis Date    Crohn's disease (H)        Past Surgical History   I have reviewed this patient's surgical history and updated it with pertinent information if needed.  Past Surgical History:   Procedure Laterality Date    COLONOSCOPY      COMBINED CYSTOSCOPY, INSERT CATHETER URETER Bilateral 3/23/2015    Procedure: COMBINED CYSTOSCOPY, INSERT CATHETER URETER;  Surgeon: Viridiana Castro MD;  Location:  OR    LAPAROSCOPIC RESECTION ILEOCECAL N/A 3/23/2015    Procedure: LAPAROSCOPIC RESECTION ILEOCECAL;  Surgeon: Marya Leon MD;  Location:  OR    LAPAROTOMY EXPLORATORY N/A 3/25/2015    Procedure: LAPAROTOMY EXPLORATORY;  Surgeon: Marya Leon MD;  Location:  OR    OTHER SURGICAL HISTORY  03/23/2015    LAPAROSCOPIC RESECTION ILEOCECAL    OTHER SURGICAL HISTORY  03/23/2015    COMBINED CYSTOSCOPY, INSERT CATHETER URETER    SMALL INTESTINE SURGERY           Social History   I have reviewed this patient's social history and updated it with pertinent information if needed.  Social History     Tobacco Use    Smoking status: Never    Smokeless tobacco: Never   Vaping Use    Vaping  "Use: Never used   Substance Use Topics    Alcohol use: No    Drug use: No        Family History   I have reviewed this patient's family history and updated it with pertinent information if needed.   Family History   Problem Relation Age of Onset    No Known Problems Mother     Unknown/Adopted Father         Prior to Admission Medications     None     Allergies   Allergies   Allergen Reactions    Percocet [Oxycodone-Acetaminophen] Other (See Comments)     \"feels funny\"       Physical Exam   Vital Signs: Temp: 99.5  F (37.5  C) Temp src: Oral BP: 131/70 Pulse: 89   Resp: 18 SpO2: 92 % O2 Device: None (Room air)    Weight: 136 lbs 0 oz    Constitutional: Well-appearing, NAD  Respiratory: Clear to auscultation bilaterally, good air movement, normal effort   Cardiovascular: RRR, no m/r/g. No peripheral edema.   GI: Soft, tender to palpation in RLQ, no rebound tenderness or guarding.    Skin: Warm, dry   Neurologic: Alert. Responding to questions appropriately. Following commands.    Psychiatric: Normal affect, appropriate      Data   Data reviewed today: I reviewed all medications, new labs and imaging results over the last 24 hours. I personally reviewed no images or EKG's today.    Recent Labs   Lab 01/27/24  1935   WBC 9.7   HGB 12.2*   MCV 81         POTASSIUM 4.0   CHLORIDE 101   CO2 26   BUN 13.2   CR 1.20*   ANIONGAP 10   JOSE MARIA 9.1   GLC 88   ALBUMIN 4.2   PROTTOTAL 7.9   BILITOTAL 0.3   ALKPHOS 57   ALT 19   AST 24   LIPASE 31       Recent Results (from the past 24 hour(s))   CT Abdomen Pelvis w Contrast    Narrative    EXAM: CT ABDOMEN PELVIS W CONTRAST  LOCATION: [facility]  DATE: 1/27/2024    INDICATION: RLQ abdominal pain  COMPARISON: 12/20/2021, 10/02/2020  TECHNIQUE: CT scan of the abdomen and pelvis was performed following injection of IV contrast. Multiplanar reformats were obtained. Dose reduction techniques were used.  CONTRAST: 69  ml Isovue 370    FINDINGS: "     LOWER CHEST: Unremarkable.     HEPATOBILIARY: Normal liver parenchyma. No biliary dilation. Normal gallbladder.     PANCREAS: Normal.     SPLEEN: Normal.     ADRENAL GLANDS: Normal.     KIDNEYS/BLADDER: Normal kidneys. No hydronephrosis. Normal urinary bladder.      BOWEL: Prior bowel resection the right lower quadrant. There is a rounded area of soft tissue attenuation in the right lower quadrant near the bowel anastomosis measuring 1.6 x 1.6 cm. This finding was not definitely present on prior CTs from 2020 and   2021. No bowel obstruction. Appendix is surgically absent. No pneumatosis or pneumoperitoneum. No abdominal free fluid.     LYMPH NODES: No pathologically enlarged lymph nodes.    VASCULATURE: Nonaneurysmal aorta.     PELVIC ORGANS: No pelvic masses.     MUSCULOSKELETAL: No acute osseous abnormalities.       Impression    IMPRESSION:  1.  Indeterminate 1.6 cm soft tissue attenuation focus along the bowel anastomosis margin in the right lower quadrant is new from prior, possibly focal wall inflammation, scar tissue, or other etiology. Recommend correlation with clinical history and   short interval follow-up CT in 3 months.  2.  Otherwise, no acute abnormalities in the abdomen or pelvis.

## 2024-01-28 NOTE — UTILIZATION REVIEW
Admission Status; Secondary Review Determination       Under the authority of the Utilization Management Committee, the utilization review process indicated a secondary review on the above patient. The review outcome is based on review of the medical records, discussions with staff, and applying clinical experience noted on the date of the review.     (x) Inpatient Status Appropriate - This patient's medical care is consistent with medical management for inpatient care and reasonable inpatient medical practice.     RATIONALE FOR DETERMINATION  --concurrent stay review     Patient requires inpatient admission versus short stay observation or outpatient treatment for the following reasons:       The Patient is 29 y/o  man with PMHx significant for   Crohn's disease  (Hx of methotrexate and Remicade Tx, but  His course has been complicated by an abscess and required ileal hemicolectomy )presents with  2 weeks RLQ pain, nausea. Abd CT shows new  Indeterminate 1.6 cm soft tissue attenuation focus along the bowel anastomosis margin in the right lower quadrant,  possibly focal wall inflammation, scar tissue, or other etiology.   The patient still has abdominal tenderness, still has nausea requiring Zofran  GI consult  recommended IV antibiotics, clear to full liquids and prep for colonoscopy if he tolerated to define further treatment.    The expected length of stay at the time of admission was more than 2 nights because of the severity of illness, intensity of service provided, and risk for adverse outcome. Inpatient admission is appropriate.       This document was produced using voice recognition software       The information on this document is developed by the utilization review team in order for the business office to ensure compliance. This only denotes the appropriateness of proper admission status and does not reflect the quality of care rendered.   The definitions of Inpatient Status and Observation Status  used in making the determination above are those provided in the CMS Coverage Manual, Chapter 1 and Chapter 6, section 70.4.   Sincerely,   XIOMY GARCIA MD   Utilization Review  Physician Advisor  [facility]

## 2024-01-28 NOTE — CONSULTS
GI  This patient is a 28-year-old man with history of Crohn's disease s/p resection.  He is admitted with abdominal pain.  By history Crohn's was diagnosed many years ago.  His course has been complicated by an abscess.  He had been treated with methotrexate and Remicade and did wind up with a ileal hemicolectomy.  Most recently he has not been on any medication.  He relates the last medication he was placed on his Pentasa but has not been taking this.    Would like you to 3 weeks he is felt poorly.  He has developed discomfort in the right lower quadrant he has had nausea with minimal vomiting.  He is not aware of fever.  He has not had defined weight loss.  He has not had a fever.  He is not aware of weight loss.  No associated skin lesions or lesions, rash and no exposure.  His symptoms were ongoing.  Presented to the ER and was admitted.    The patient has no allergies he is intolerant to Percocet.  Takes no medications as an outpatient.  Other than the resection he has not had any surgeries.  Other than Crohn's he has been in good health.  He is not a smoker or drinker.  Lives with a roommate.  His family history is noncontributory.    On exam he appears in no acute distress with stable vital signs.  His temperature admission was 99.5.  He does not have jaundice or pallor.  No adenopathy in the neck.  Lungs are clear.  S1-S2 appears regular without a murmur.  His abdomen is flat soft there is mild tenderness in the lower abdomen not localized.  He has no peripheral edema is alert and oriented.    Laboratory data from admission showed normal electrolytes.  His white count is 9 7 hemoglobin 12.2.  LFTs are all normal lipase was normal as well    His CAT scan is reviewed.  Does show what may be an inflammatory process developing in the right lower quadrant.  Definitive laboratory bowel disease cannot be assessed.    At this stage it is unclear whether this represents recurrent Crohn's or an inflammatory process  related to this.  Agree with antibiotics for the next day or so.  We can reassess.  He will ultimately need a colonoscopy to define if he has recurrence or not.  If so treatment with an biologic agent would be in order.    In the meantime the patient can remain on clear to full liquids.  If he is feeling better in the next 24 hours, he can be prepped for colonoscopy with further suggestions to follow.  We appreciate assisting in this patient care

## 2024-01-28 NOTE — PROGRESS NOTES
RECEIVING UNIT ED HANDOFF REVIEW    ED Nurse Handoff Report was reviewed by: Jonathon Loya RN on January 28, 2024 at 12:00 AM

## 2024-01-28 NOTE — PHARMACY-ADMISSION MEDICATION HISTORY
Pharmacist Admission Medication History    Admission medication history is complete. The information provided in this note is only as accurate as the sources available at the time of the update.    Information Source(s): Patient, Hospital records, and CareEverywhere/SureScripts via in-person    Pertinent Information: Patient reports not taking any medications, vitamins, or use of eye drops and inhalers.     Changes made to PTA medication list:  Added: None  Deleted: None  Changed: None        Allergies reviewed with patient and updates made in EHR: no    Medication History Completed By: Lety Park RPH 1/27/2024 10:25 PM    No outpatient medications have been marked as taking for the 1/27/24 encounter (Hospital Encounter).

## 2024-01-28 NOTE — PROGRESS NOTES
[facility]    Hospitalist Progress Note    Interval History   - Continues to have crampy RLQ pain but slightly improved today, no other complaints, tolerating liquid diet  - Per GI note, continue IV abx, possible colonoscopy prep starting tomorrow    Assessment & Plan   Summary: Giovanni Carranza is a 28 year old male with PMH Crohn's disease with ileocecal resection who was admitted on 1/27/2024 with progressive RLQ cramping and pain.    Crohn's disease  History of ileocecal resection 3/2015, not currently on any treatment. Reports appendix removed with prior surgery. Presents with 2-3 weeks intermittent RLQ pain, intermittent nausea. CT abd/pelvis with indeterminate 1.6 cm soft tissue attenuation focus along the bowel anastomosis margin in RLQ. Having normal stools.   - Appreciate [facility] consult   - Continue Zosyn   - Possible [place] prep tomorrow  - Colorectal surgery consult  - Clear/fulls until tomorrow  - Anti-emetics PRN  - Hydromorphone IV/PO PRN   - Continue IVF    Clinically Significant Risk Factors Present on Admission                                   PT/OT: not needed  Diet: Combination Diet Clear Liquid    DVT Prophylaxis: Low Risk/Ambulatory with no VTE prophylaxis indicated  Singer Catheter: Not present  Lines: None     Cardiac Monitoring: None  Code Status: Full Code    Disposition: Anticipated discharge ~2 days    Saravanan Freedman MD  Hospitalist Service  [facility]  Securely message with GotGame (more info)  Text page via PushSpring Paging/Directory     - Total time spent on encounter is 35 minutes, which includes counseling, coordination of care, time spent discussing with family, and/or time spent discussing with consultants.    Data reviewed today: I reviewed all new labs and imaging results over the last 24 hours.    Physical Exam   Temp: 98.1  F (36.7  C) Temp src: Oral BP: 113/63 Pulse: 85   Resp: 16 SpO2: 97 % O2 Device: None (Room air)     Vitals:    01/27/24 1928 01/28/24 0600   Weight: 61.7 kg (136 lb) 65.3 kg (143 lb 15.4 oz)     Vital Signs with Ranges  Temp:  [98.1  F (36.7  C)-99.5  F (37.5  C)] 98.1  F (36.7  C)  Pulse:  [69-89] 85  Resp:  [16-18] 16  BP: (113-135)/(63-88) 113/63  SpO2:  [92 %-100 %] 97 %  I/O last 3 completed shifts:  In: 2045 [P.O.:450; I.V.:495; IV Piggyback:1100]  Out: 750 [Urine:750]  O2 requirements: no    Constitutional: Young male in NAD  HEENT: Eyes nonicteric, oral mucosa moist  Cardiovascular: RRR, normal S1/2, no m/r/g  Respiratory: CTAB, no wheezing or crackles  Vascular: No LE pitting edema, noted distal pedal pulses  GI: Normoactive bowel sounds, mild RLQ > RUQ tenderness  Skin/Integumen: No rashes  Neuro/Psych: Appropriate affect and mood. A&Ox3, moves all extremities    Medications    lactated ringers 100 mL/hr at 01/28/24 1114      enoxaparin ANTICOAGULANT  40 mg Subcutaneous Q24H    piperacillin-tazobactam  3.375 g Intravenous Q6H    sodium chloride (PF)  3 mL Intracatheter Q8H       Data   Recent Labs   Lab 01/28/24  0626 01/27/24  1935   WBC 8.2 9.7   HGB 11.9* 12.2*   MCV 81 81    279    137   POTASSIUM 3.8 4.0   CHLORIDE 102 101   CO2 26 26   BUN 11.9 13.2   CR 1.19* 1.20*   ANIONGAP 8 10   JOSE MARIA 8.6 9.1   GLC 98 88   ALBUMIN  --  4.2   PROTTOTAL  --  7.9   BILITOTAL  --  0.3   ALKPHOS  --  57   ALT  --  19   AST  --  24   LIPASE  --  31       Imaging:   Recent Results (from the past 24 hour(s))   CT Abdomen Pelvis w Contrast    Narrative    EXAM: CT ABDOMEN PELVIS W CONTRAST  LOCATION: [facility]  DATE: 1/27/2024    INDICATION: RLQ abdominal pain  COMPARISON: 12/20/2021, 10/02/2020  TECHNIQUE: CT scan of the abdomen and pelvis was performed following injection of IV contrast. Multiplanar reformats were obtained. Dose reduction techniques were used.  CONTRAST: 69  ml Isovue 370    FINDINGS:     LOWER CHEST: Unremarkable.     HEPATOBILIARY: Normal liver parenchyma. No  biliary dilation. Normal gallbladder.     PANCREAS: Normal.     SPLEEN: Normal.     ADRENAL GLANDS: Normal.     KIDNEYS/BLADDER: Normal kidneys. No hydronephrosis. Normal urinary bladder.      BOWEL: Prior bowel resection the right lower quadrant. There is a rounded area of soft tissue attenuation in the right lower quadrant near the bowel anastomosis measuring 1.6 x 1.6 cm. This finding was not definitely present on prior CTs from 2020 and   2021. No bowel obstruction. Appendix is surgically absent. No pneumatosis or pneumoperitoneum. No abdominal free fluid.     LYMPH NODES: No pathologically enlarged lymph nodes.    VASCULATURE: Nonaneurysmal aorta.     PELVIC ORGANS: No pelvic masses.     MUSCULOSKELETAL: No acute osseous abnormalities.       Impression    IMPRESSION:  1.  Indeterminate 1.6 cm soft tissue attenuation focus along the bowel anastomosis margin in the right lower quadrant is new from prior, possibly focal wall inflammation, scar tissue, or other etiology. Recommend correlation with clinical history and   short interval follow-up CT in 3 months.  2.  Otherwise, no acute abnormalities in the abdomen or pelvis.

## 2024-01-28 NOTE — ED NOTES
"[facility]  ED Nurse Handoff Report    ED Chief complaint: Abdominal Pain      ED Diagnosis:   Final diagnoses:   Right lower quadrant abdominal pain   Crohn's disease with complication, unspecified gastrointestinal tract location (H)       Code Status: deferred to admitting povider    Allergies:   Allergies   Allergen Reactions    Percocet [Oxycodone-Acetaminophen] Other (See Comments)     \"feels funny\"       Patient Story:   Pt reports intermittent RLQ pain for past two weeks with nausea. Hx of crohns with abscesses near pelvis years ago. Denies dysuria.     Focused Assessment:    Neuro: Alert, oriented x 4  Respiratory:Clear lung sounds, on room air   Cardiology:  nsr   Gastrointestinal: soft, tender rlq, non distended   Genitourinary/Renal:  wnl  Musculoskeletal: moves all extremities   Skin: Intact skin   Lines: 18glac    Labs Ordered and Resulted from Time of ED Arrival to Time of ED Departure   COMPREHENSIVE METABOLIC PANEL - Abnormal       Result Value    Sodium 137      Potassium 4.0      Carbon Dioxide (CO2) 26      Anion Gap 10      Urea Nitrogen 13.2      Creatinine 1.20 (*)     GFR Estimate 85      Calcium 9.1      Chloride 101      Glucose 88      Alkaline Phosphatase 57      AST 24      ALT 19      Protein Total 7.9      Albumin 4.2      Bilirubin Total 0.3     CBC WITH PLATELETS AND DIFFERENTIAL - Abnormal    WBC Count 9.7      RBC Count 4.88      Hemoglobin 12.2 (*)     Hematocrit 39.5 (*)     MCV 81      MCH 25.0 (*)     MCHC 30.9 (*)     RDW 16.5 (*)     Platelet Count 279      % Neutrophils 81      % Lymphocytes 14      % Monocytes 5      % Eosinophils 0      % Basophils 0      % Immature Granulocytes 0      NRBCs per 100 WBC 0      Absolute Neutrophils 7.8      Absolute Lymphocytes 1.4      Absolute Monocytes 0.5      Absolute Eosinophils 0.0      Absolute Basophils 0.0      Absolute Immature Granulocytes 0.0      Absolute NRBCs 0.0     LIPASE - Normal    Lipase 31     BLOOD " CULTURE   BLOOD CULTURE        CT Abdomen Pelvis w Contrast   Final Result   IMPRESSION:   1.  Indeterminate 1.6 cm soft tissue attenuation focus along the bowel anastomosis margin in the right lower quadrant is new from prior, possibly focal wall inflammation, scar tissue, or other etiology. Recommend correlation with clinical history and    short interval follow-up CT in 3 months.   2.  Otherwise, no acute abnormalities in the abdomen or pelvis.            Treatments and/or interventions provided:      Medications   piperacillin-tazobactam (ZOSYN) 4.5 g vial to attach to  mL bag (has no administration in time range)   iopamidol (ISOVUE-370) solution 69 mL (69 mLs Intravenous $Given 1/27/24 2123)   Saline Flush (60 mLs Intravenous $Given 1/27/24 2124)   lactated ringers BOLUS 1,000 mL (1,000 mLs Intravenous $New Bag 1/27/24 2206)        Patient's response to treatments and/or interventions:   Resting comfortably    To be done/followed up on inpatient unit:    See any in-patient orders    Does this patient have any cognitive concerns?:  na    Activity level - Baseline/Home:    Independent    Activity Level - Current:     Independent    Patient's Preferred language: English     Needed?: No    Isolation: None  Infection: Not Applicable  Patient tested for COVID 19 prior to admission: NO    Bariatric?: No    Vital Signs:   Vitals:    01/27/24 2100 01/27/24 2145 01/27/24 2200 01/27/24 2215   BP: 135/88      Pulse: 69      Resp:       Temp:       TempSrc:       SpO2: 99% 99% 97% 98%   Weight:       Height:           Cardiac Rhythm:     Was the PSS-3 completed:   Yes  What interventions are required if any?               Family Comments: na  OBS brochure/video discussed/provided to patient/family: No              Name of person given brochure if not patient:              Relationship to patient:    For the majority of the shift this patient's behavior was Green.   Behavioral interventions performed were      ED NURSE PHONE NUMBER: [phone]

## 2024-01-28 NOTE — PROGRESS NOTES
Surgery Note    Chart reviewed. Pt with known history of crohn's disease and has had prior right hemicolectomy with Dr. Leon with colorectal for fistulizing disease. He would be better served to follow with colorectal services. I will place a consult and cancel the general surgery consult.     Jil Cook MD  [facility]  [phone]

## 2024-01-28 NOTE — CONSULTS
[facility]    Colon and Rectal Surgery Consultation    Date of Admission:  1/27/2024    Assessment & Plan   Giovanni is a very pleasant 28-year-old male with history of Crohn's presenting with 2-week history of abdominal pain, nausea, headache.  He has a significant history of exploratory laparotomy with ileocecectomy in 2015.  Based on the CT scan and blood work the abdominal pain is likely to be a Crohn's flare.  He has no signs of abscess. His abdominal exam is consistent with mild discomfort in RLQ. He is able to eat and have bowel function as per usual.     Plan    No surgical intervention indicated at this time.   Agree with GI for colonoscopy.   Patient may need to resume Crohn's medications based on his scope  Will continue to follow along    Patient discussed with Dr Burgess Rufina Curry MD  Fellow, Colon and Rectal Surgery  [facility]  Pager: [phone]      Code Status    Full Code    Discussed with the fellow. Labs and vitals reviewed. Imagining evaluated.  Agree with assessment and plan. No evidence of acute obstruction. Does have significant stool burden. Agree with GI consultation for endoscopy and eval for flare.  Debbie Hill MD  Colon And Rectal Surgery   Pager: [phone]      1/29/2024  9:35 AM    Debbie Hill MD    [facility]  [address], [address]  [place] [address]  T: [phone]  F: [phone]  Pager: [phone]      For questions/paging, please contact the CRS office at [phone].       1/29/2024  9:35 AM        Reason for Consult   Reason for consult: abdominal pain    Primary Care Physician   [facility]    Chief Complaint   Abdominal pain     History is obtained from the patient    History of Present Illness   Giovanni Carranza is a 28 year old male who presents with 2-week history of nausea and abdominal pain especially in his right lower quadrant.  Patient has a  "significant history of Crohn's disease and a significant surgical history of a ileocecectomy in 2015.  He has had no other surgeries besides this.  Per the patient he has received various Crohn's medications in his past and his last 1 was Pentasa which he stopped earlier in 2023.  He states that he has a GI doctor with [facility] but has not been seen recently.  He states that his current abdominal pain started about 2 weeks ago and was exacerbated with nausea.  No fevers or chills.  No enteritis or viral illness illness.  He has had no sick contacts around him.  He denies any weight loss recently.  No changes to his bowel habits.  He is able to eat solid food and have regular bowel movements as per usual.  No bleeding.  He does not exactly remember when his last colonoscopy was but he is due for it.    Past Medical History   Crohn's, depression, psoas abscess with ESBL    Past Surgical History   Ileocecectomy in 2015    Prior to Admission Medications   None     Allergies   Allergies   Allergen Reactions    Percocet [Oxycodone-Acetaminophen] Other (See Comments)     \"feels funny\"       Social History   I have personally reviewed the social history with the patient showing non-smoker nondrinker.    Family History   Family history reviewed with patient and is noncontributory.    Review of Systems   The 10 point Review of Systems is negative other than noted in the HPI.    Physical Exam   Temp: 98.1  F (36.7  C) Temp src: Oral BP: 113/63 Pulse: 85   Resp: 16 SpO2: 97 % O2 Device: None (Room air)    Vital Signs with Ranges  Temp:  [98.1  F (36.7  C)-99.5  F (37.5  C)] 98.1  F (36.7  C)  Pulse:  [69-89] 85  Resp:  [16-18] 16  BP: (113-135)/(63-88) 113/63  SpO2:  [92 %-100 %] 97 %  143 lbs 15.37 oz    Constitutional: awake, alert, cooperative, no apparent distress, and appears stated age  Respiratory: No increased work of breathing, good air exchange, clear to auscultation bilaterally, no crackles or wheezing  Cardiovascular: " normal apical pulses   GI: Abdomen soft, nondistended, discomfort in the right lower quadrant on palpation, not peritonitic, scar seen from his surgery  Skin: no bruising or bleeding    Data   CT scan and labs reviewed    Results for orders placed or performed during the hospital encounter of 01/27/24 (from the past 24 hour(s))   CBC with platelets + differential    Narrative    The following orders were created for panel order CBC with platelets + differential.  Procedure                               Abnormality         Status                     ---------                               -----------         ------                     CBC with platelets and d...[[id number]]  Abnormal            Final result                 Please view results for these tests on the individual orders.   Comprehensive metabolic panel   Result Value Ref Range    Sodium 137 135 - 145 mmol/L    Potassium 4.0 3.4 - 5.3 mmol/L    Carbon Dioxide (CO2) 26 22 - 29 mmol/L    Anion Gap 10 7 - 15 mmol/L    Urea Nitrogen 13.2 6.0 - 20.0 mg/dL    Creatinine 1.20 (H) 0.67 - 1.17 mg/dL    GFR Estimate 85 >60 mL/min/1.73m2    Calcium 9.1 8.6 - 10.0 mg/dL    Chloride 101 98 - 107 mmol/L    Glucose 88 70 - 99 mg/dL    Alkaline Phosphatase 57 40 - 150 U/L    AST 24 0 - 45 U/L    ALT 19 0 - 70 U/L    Protein Total 7.9 6.4 - 8.3 g/dL    Albumin 4.2 3.5 - 5.2 g/dL    Bilirubin Total 0.3 <=1.2 mg/dL   Lipase   Result Value Ref Range    Lipase 31 13 - 60 U/L   CBC with platelets and differential   Result Value Ref Range    WBC Count 9.7 4.0 - 11.0 10e3/uL    RBC Count 4.88 4.40 - 5.90 10e6/uL    Hemoglobin 12.2 (L) 13.3 - 17.7 g/dL    Hematocrit 39.5 (L) 40.0 - 53.0 %    MCV 81 78 - 100 fL    MCH 25.0 (L) 26.5 - 33.0 pg    MCHC 30.9 (L) 31.5 - 36.5 g/dL    RDW 16.5 (H) 10.0 - 15.0 %    Platelet Count 279 150 - 450 10e3/uL    % Neutrophils 81 %    % Lymphocytes 14 %    % Monocytes 5 %    % Eosinophils 0 %    % Basophils 0 %    % Immature Granulocytes 0 %     NRBCs per 100 WBC 0 <1 /100    Absolute Neutrophils 7.8 1.6 - 8.3 10e3/uL    Absolute Lymphocytes 1.4 0.8 - 5.3 10e3/uL    Absolute Monocytes 0.5 0.0 - 1.3 10e3/uL    Absolute Eosinophils 0.0 0.0 - 0.7 10e3/uL    Absolute Basophils 0.0 0.0 - 0.2 10e3/uL    Absolute Immature Granulocytes 0.0 <=0.4 10e3/uL    Absolute NRBCs 0.0 10e3/uL   CRP inflammation   Result Value Ref Range    CRP Inflammation 17.21 (H) <5.00 mg/L   CT Abdomen Pelvis w Contrast    Narrative    EXAM: CT ABDOMEN PELVIS W CONTRAST  LOCATION: [facility]  DATE: 1/27/2024    INDICATION: RLQ abdominal pain  COMPARISON: 12/20/2021, 10/02/2020  TECHNIQUE: CT scan of the abdomen and pelvis was performed following injection of IV contrast. Multiplanar reformats were obtained. Dose reduction techniques were used.  CONTRAST: 69  ml Isovue 370    FINDINGS:     LOWER CHEST: Unremarkable.     HEPATOBILIARY: Normal liver parenchyma. No biliary dilation. Normal gallbladder.     PANCREAS: Normal.     SPLEEN: Normal.     ADRENAL GLANDS: Normal.     KIDNEYS/BLADDER: Normal kidneys. No hydronephrosis. Normal urinary bladder.      BOWEL: Prior bowel resection the right lower quadrant. There is a rounded area of soft tissue attenuation in the right lower quadrant near the bowel anastomosis measuring 1.6 x 1.6 cm. This finding was not definitely present on prior CTs from 2020 and   2021. No bowel obstruction. Appendix is surgically absent. No pneumatosis or pneumoperitoneum. No abdominal free fluid.     LYMPH NODES: No pathologically enlarged lymph nodes.    VASCULATURE: Nonaneurysmal aorta.     PELVIC ORGANS: No pelvic masses.     MUSCULOSKELETAL: No acute osseous abnormalities.       Impression    IMPRESSION:  1.  Indeterminate 1.6 cm soft tissue attenuation focus along the bowel anastomosis margin in the right lower quadrant is new from prior, possibly focal wall inflammation, scar tissue, or other etiology. Recommend correlation with  clinical history and   short interval follow-up CT in 3 months.  2.  Otherwise, no acute abnormalities in the abdomen or pelvis.   CBC with platelets   Result Value Ref Range    WBC Count 8.2 4.0 - 11.0 10e3/uL    RBC Count 4.85 4.40 - 5.90 10e6/uL    Hemoglobin 11.9 (L) 13.3 - 17.7 g/dL    Hematocrit 39.5 (L) 40.0 - 53.0 %    MCV 81 78 - 100 fL    MCH 24.5 (L) 26.5 - 33.0 pg    MCHC 30.1 (L) 31.5 - 36.5 g/dL    RDW 16.4 (H) 10.0 - 15.0 %    Platelet Count 211 150 - 450 10e3/uL   Basic metabolic panel   Result Value Ref Range    Sodium 136 135 - 145 mmol/L    Potassium 3.8 3.4 - 5.3 mmol/L    Chloride 102 98 - 107 mmol/L    Carbon Dioxide (CO2) 26 22 - 29 mmol/L    Anion Gap 8 7 - 15 mmol/L    Urea Nitrogen 11.9 6.0 - 20.0 mg/dL    Creatinine 1.19 (H) 0.67 - 1.17 mg/dL    GFR Estimate 86 >60 mL/min/1.73m2    Calcium 8.6 8.6 - 10.0 mg/dL    Glucose 98 70 - 99 mg/dL

## 2024-01-28 NOTE — PLAN OF CARE
Goal Outcome Evaluation:      Plan of Care Reviewed With: patient    Overall Patient Progress: no changeOverall Patient Progress: no change     Date & Time: 1/28/24 3273-2075  Surgery/POD#: N/A.  ED admit here with Crohn's disease complication  Behavior & Aggression: green  Fall Risk: No  Orientation:AOx4  ABNL VS/O2:VSS on RA  ABNL Labs: Awaiting AM labs  Pain Management:Pain managed with IV dilaudid x1 and PO dilaudid x1 and heat  Bowel/Bladder: No BM this shift, BS normoactive. Voiding in urinal.   IV/Drains: L PIV infusing LR at 100mL/hr with int abx  Diet: Clears  Activity Level: Up ind  Tests/Procedures: Awaiting Surg consult  Anticipated  DC Date: Pending  Significant Information: Nausea improved with zofran x1.

## 2024-01-28 NOTE — ED PROVIDER NOTES
"  History     Chief Complaint:  Abdominal Pain       HPI   Giovanni Carranza is a 28 year old male with a history of Crohn's Disease and H. Pylori who presents with intermittent RLQ abdominal pain since 2 weeks ago. He describes the pain as a \"bulging pressure\", and notes that it does not radiate anywhere. The pain worsens whenever he coughs, walks, or laughs. Another symptom mentioned in the Triage note was some nausea, but he denies this at bedside. His only prior abdominal surgery was a ileocecal resection, but he reports he may still have his appendix. He is not currently taking any medications for his Crohn's disease as he has not had any flare ups. He denies dysuria, testicular pain, fever, chills, rhinorrhea, congestion, cough, sore throat, chest pain, shortness of breath, vomiting, melena, hematochezia, diarrhea, neck pain, headaches, or numbness/weakness. He follows with Dr. Disla at [facility].     Review of Systems   Constitutional:  Negative for chills and fever.   HENT:  Negative for congestion, rhinorrhea and sore throat.    Respiratory:  Negative for cough and shortness of breath.    Cardiovascular:  Negative for chest pain.   Gastrointestinal:  Positive for abdominal pain. Negative for blood in stool, diarrhea, nausea and vomiting.   Genitourinary:  Negative for dysuria and testicular pain.   Musculoskeletal:  Negative for neck pain.   Neurological:  Negative for weakness and headaches.     Independent Historian:   None - Patient Only    Review of External Notes:   None     Medications:    The patient denies current use of medications.     Past Medical History:    Crohn's disease   H. Pylori   Mild TBI   Depression     Past Surgical History:    B/L cystoscopy w/ ureter catheter insertion   Ileocecal resection   Exploratory laparotomy   GI colon w/ bx     Physical Exam   Patient Vitals for the past 24 hrs:   BP Temp Temp src Pulse Resp SpO2 Height Weight   01/28/24 0020 131/70 99.5  F (37.5  C) Oral 89 18 " "92 % -- --   01/28/24 0000 133/83 -- -- 86 -- -- -- --   01/27/24 2330 134/88 -- -- 87 -- -- -- --   01/27/24 2300 132/88 -- -- 80 -- -- -- --   01/27/24 2251 -- -- -- -- -- 99 % -- --   01/27/24 2245 -- -- -- -- -- 98 % -- --   01/27/24 2230 126/82 -- -- 84 -- 100 % -- --   01/27/24 2215 -- -- -- -- -- 98 % -- --   01/27/24 2200 -- -- -- -- -- 97 % -- --   01/27/24 2145 -- -- -- -- -- 99 % -- --   01/27/24 2100 135/88 -- -- 69 -- 99 % -- --   01/27/24 1930 123/72 -- -- 84 16 98 % -- --   01/27/24 1928 -- 98.9  F (37.2  C) Temporal -- -- -- 1.727 m (5' 8\") 61.7 kg (136 lb)        Constitutional:       General: Not in acute distress.        Appearance: Normal appearance.   HENT:      Head: Normocephalic and atraumatic.   Eyes:      Extraocular Movements: Extraocular movements intact.      Conjunctiva/sclera: Conjunctivae normal.   Cardiovascular:      Rate and Rhythm: Normal rate and regular rhythm.   Pulmonary:      Effort: Pulmonary effort is normal. No respiratory distress.      Breath sounds: Normal breath sounds.   Abdominal:      General: Abdomen is flat. There is no distension.      Palpations: Abdomen is soft.      Tenderness: There is right lower quadrant/midline lower abdominal tenderness to palpation.   Musculoskeletal:      Cervical back: Normal range of motion. No rigidity.      Right lower leg: No edema.      Left lower leg: No edema.   Skin:     General: Skin is warm and dry.   Neurological:      General: No focal deficit present.      Mental Status: Alert and oriented to person, place, and time.   Psychiatric:         Mood and Affect: Mood normal.         Behavior: Behavior normal.    Emergency Department Course   Imaging:  CT Abdomen Pelvis w Contrast  Final Result  IMPRESSION:  1.  Indeterminate 1.6 cm soft tissue attenuation focus along the bowel anastomosis margin in the right lower quadrant is new from prior, possibly focal wall inflammation, scar tissue, or other etiology. Recommend correlation " with clinical history and   short interval follow-up CT in 3 months.  2.  Otherwise, no acute abnormalities in the abdomen or pelvis.     Report per radiology    Laboratory:  Labs Ordered and Resulted from Time of ED Arrival to Time of ED Departure   COMPREHENSIVE METABOLIC PANEL - Abnormal       Result Value    Sodium 137      Potassium 4.0      Carbon Dioxide (CO2) 26      Anion Gap 10      Urea Nitrogen 13.2      Creatinine 1.20 (*)     GFR Estimate 85      Calcium 9.1      Chloride 101      Glucose 88      Alkaline Phosphatase 57      AST 24      ALT 19      Protein Total 7.9      Albumin 4.2      Bilirubin Total 0.3     CBC WITH PLATELETS AND DIFFERENTIAL - Abnormal    WBC Count 9.7      RBC Count 4.88      Hemoglobin 12.2 (*)     Hematocrit 39.5 (*)     MCV 81      MCH 25.0 (*)     MCHC 30.9 (*)     RDW 16.5 (*)     Platelet Count 279      % Neutrophils 81      % Lymphocytes 14      % Monocytes 5      % Eosinophils 0      % Basophils 0      % Immature Granulocytes 0      NRBCs per 100 WBC 0      Absolute Neutrophils 7.8      Absolute Lymphocytes 1.4      Absolute Monocytes 0.5      Absolute Eosinophils 0.0      Absolute Basophils 0.0      Absolute Immature Granulocytes 0.0      Absolute NRBCs 0.0     LIPASE - Normal    Lipase 31     BLOOD CULTURE   BLOOD CULTURE      Emergency Department Course & Assessments:    Interventions:  Medications   iopamidol (ISOVUE-370) solution 69 mL (69 mLs Intravenous $Given 1/27/24 2123)   Saline Flush (60 mLs Intravenous $Given 1/27/24 2124)   lactated ringers BOLUS 1,000 mL (0 mLs Intravenous Stopped 1/28/24 0002)   piperacillin-tazobactam (ZOSYN) 4.5 g vial to attach to  mL bag (0 g Intravenous Stopped 1/27/24 2252)        Independent Interpretation (X-rays, CTs, rhythm strip):  None    Assessments/Consultations/Discussion of Management or Tests:  ED Course as of 01/28/24 0032   Sat Jan 27, 2024 2104 I obtained history and examined the patient as noted above.   2201 I  spoke with Dr. Carter with General Surgery. He recommends hospital admission, initiation of IV antibiotics, and GI assessment in the morning. He also requests placement of a general surgery consult order and his team will evaluate the patient in the AM as well. Patient may need to be restarted on immunomodulating therapy for Crohn's.    Patient updated on plan for admission and voiced understanding and agreement. Answered all questions.    [facility] for [facility].       Social Determinants of Health affecting care:   None    Disposition:  The patient was admitted to the hospital under the care of Dr. Hwang.     Impression & Plan    Medical Decision Makin-year-old male as described above presents to the emergency department with right lower quadrant abdominal pain for the past few weeks.  Patient hemodynamically stable at time of evaluation.  Afebrile.  History of Crohn's disease and abscess.  History of bowel resection secondary to Crohn's and abscess.  Broad differential diagnosis considered includes, but not limited to, acute appendicitis, infectious colitis, Crohn's flare, bowel obstruction, bowel perforation, diverticulitis, nephrolithiasis/ureteral colic, and hernia.  Will obtain CT and pelvis with contrast for evaluation of above discussed differentials.  Patient declined pain medication at this time.  Will reevaluate.  Discussed care plan with patient who voiced understanding and agreement with plan.  Answered all questions.  Additional workup and orders as listed in chart.     Please refer to ED course above as part of continuation of MDM for details on the patient's treatment course and any changes or updates in care plan beyond my initial evaluation and MDM creation.      Diagnosis:    ICD-10-CM    1. Right lower quadrant abdominal pain  R10.31       2. Crohn's disease with complication, unspecified gastrointestinal tract  location (H)  K50.919              Scribe Disclosure:  I, Geovannyeliecer Osborn, am serving as a scribe at 9:04 PM on 1/27/2024 to document services personally performed by Francisco Albert DO based on my observations and the provider's statements to me.   1/27/2024   Francisco Albert DO Yeh, Ferris, DO  01/28/24 0032

## 2024-01-29 ENCOUNTER — ANESTHESIA EVENT (OUTPATIENT)
Dept: GASTROENTEROLOGY | Facility: CLINIC | Age: 29
End: 2024-01-29
Payer: COMMERCIAL

## 2024-01-29 PROCEDURE — 120N000001 HC R&B MED SURG/OB

## 2024-01-29 PROCEDURE — 99232 SBSQ HOSP IP/OBS MODERATE 35: CPT | Performed by: INTERNAL MEDICINE

## 2024-01-29 PROCEDURE — 258N000003 HC RX IP 258 OP 636: Performed by: INTERNAL MEDICINE

## 2024-01-29 PROCEDURE — 250N000013 HC RX MED GY IP 250 OP 250 PS 637: Performed by: PHYSICIAN ASSISTANT

## 2024-01-29 PROCEDURE — 250N000011 HC RX IP 250 OP 636: Performed by: INTERNAL MEDICINE

## 2024-01-29 RX ORDER — MAGNESIUM CARB/ALUMINUM HYDROX 105-160MG
296 TABLET,CHEWABLE ORAL ONCE
Status: COMPLETED | OUTPATIENT
Start: 2024-01-30 | End: 2024-01-30

## 2024-01-29 RX ORDER — BISACODYL 5 MG
10 TABLET, DELAYED RELEASE (ENTERIC COATED) ORAL ONCE
Status: COMPLETED | OUTPATIENT
Start: 2024-01-29 | End: 2024-01-29

## 2024-01-29 RX ORDER — POLYETHYLENE GLYCOL 3350 17 G/17G
238 POWDER, FOR SOLUTION ORAL ONCE
Status: COMPLETED | OUTPATIENT
Start: 2024-01-29 | End: 2024-01-29

## 2024-01-29 RX ADMIN — PIPERACILLIN AND TAZOBACTAM 3.38 G: 3; .375 INJECTION, POWDER, FOR SOLUTION INTRAVENOUS at 21:47

## 2024-01-29 RX ADMIN — PIPERACILLIN AND TAZOBACTAM 3.38 G: 3; .375 INJECTION, POWDER, FOR SOLUTION INTRAVENOUS at 03:53

## 2024-01-29 RX ADMIN — ENOXAPARIN SODIUM 40 MG: 40 INJECTION SUBCUTANEOUS at 09:29

## 2024-01-29 RX ADMIN — BISACODYL 10 MG: 5 TABLET, COATED ORAL at 10:15

## 2024-01-29 RX ADMIN — PIPERACILLIN AND TAZOBACTAM 3.38 G: 3; .375 INJECTION, POWDER, FOR SOLUTION INTRAVENOUS at 16:13

## 2024-01-29 RX ADMIN — SODIUM CHLORIDE, POTASSIUM CHLORIDE, SODIUM LACTATE AND CALCIUM CHLORIDE: 600; 310; 30; 20 INJECTION, SOLUTION INTRAVENOUS at 09:24

## 2024-01-29 RX ADMIN — PIPERACILLIN AND TAZOBACTAM 3.38 G: 3; .375 INJECTION, POWDER, FOR SOLUTION INTRAVENOUS at 10:15

## 2024-01-29 RX ADMIN — POLYETHYLENE GLYCOL 3350 238 G: 17 POWDER, FOR SOLUTION ORAL at 16:13

## 2024-01-29 ASSESSMENT — ACTIVITIES OF DAILY LIVING (ADL)
ADLS_ACUITY_SCORE: 37

## 2024-01-29 NOTE — PROGRESS NOTES
[facility]    Hospitalist Progress Note    Interval History   - Feels improve today, pain improved, prepping this afternoon    Assessment & Plan   Summary: Giovanni Carranza is a 28 year old male with PMH Crohn's disease with ileocecal resection who was admitted on 1/27/2024 with progressive RLQ cramping and pain.    Crohn's disease  History of ileocecal resection 3/2015, not currently on any treatment. Reports appendix removed with prior surgery. Presents with 2-3 weeks intermittent RLQ pain, intermittent nausea. CT abd/pelvis with indeterminate 1.6 cm soft tissue attenuation focus along the bowel anastomosis margin in RLQ. Having normal stools.   - Appreciate [facility] consult   - Continue Zosyn   - [place] prep  - Colorectal surgery consult  - Clear/fulls until tomorrow  - Anti-emetics PRN  - Hydromorphone IV/PO PRN   - Stop IVF    Clinically Significant Risk Factors                                     PT/OT: not needed  Diet: Combination Diet Clear Liquid    DVT Prophylaxis: Low Risk/Ambulatory with no VTE prophylaxis indicated  Singer Catheter: Not present  Lines: None     Cardiac Monitoring: None  Code Status: Full Code    Disposition: Anticipated discharge ~2 days    Saravanan Freedman MD  Hospitalist Service  [facility]  Securely message with [employer] (more info)  Text page via [employer] Paging/Directory     - Total time spent on encounter is 35 minutes, which includes counseling, coordination of care, time spent discussing with family, and/or time spent discussing with consultants.    Data reviewed today: I reviewed all new labs and imaging results over the last 24 hours.    Physical Exam   Temp: 97.5  F (36.4  C) Temp src: Oral BP: 106/54 Pulse: 57   Resp: 16 SpO2: 97 % O2 Device: None (Room air)    Vitals:    01/27/24 1928 01/28/24 0600 01/29/24 0442   Weight: 61.7 kg (136 lb) 65.3 kg (143 lb 15.4 oz) 65 kg (143 lb 4.8 oz)     Vital Signs with Ranges  Temp:  [97.5  F  (36.4  C)-98.3  F (36.8  C)] 97.5  F (36.4  C)  Pulse:  [57-75] 57  Resp:  [16] 16  BP: (105-114)/(54-68) 106/54  SpO2:  [97 %-98 %] 97 %  I/O last 3 completed shifts:  In: 3856 [P.O.:1360; I.V.:2496]  Out: 1325 [Urine:1325]  O2 requirements: no    Constitutional: Young male in NAD  HEENT: Eyes nonicteric, oral mucosa moist  Cardiovascular: RRR, normal S1/2, no m/r/g  Respiratory: CTAB, no wheezing or crackles  Vascular: No LE pitting edema, noted distal pedal pulses  GI: Normoactive bowel sounds, mild RLQ > RUQ tenderness  Skin/Integumen: No rashes  Neuro/Psych: Appropriate affect and mood. A&Ox3, moves all extremities    Medications    lactated ringers 100 mL/hr at 01/29/24 0924      enoxaparin ANTICOAGULANT  40 mg Subcutaneous Q24H    polyethylene glycol  238 g Oral Once    Followed by    [START ON 1/30/2024] magnesium citrate  296 mL Oral Once    piperacillin-tazobactam  3.375 g Intravenous Q6H    sodium chloride (PF)  3 mL Intracatheter Q8H       Data   Recent Labs   Lab 01/28/24  0626 01/27/24  1935   WBC 8.2 9.7   HGB 11.9* 12.2*   MCV 81 81    279    137   POTASSIUM 3.8 4.0   CHLORIDE 102 101   CO2 26 26   BUN 11.9 13.2   CR 1.19* 1.20*   ANIONGAP 8 10   JOSE MARIA 8.6 9.1   GLC 98 88   ALBUMIN  --  4.2   PROTTOTAL  --  7.9   BILITOTAL  --  0.3   ALKPHOS  --  57   ALT  --  19   AST  --  24   LIPASE  --  31       Imaging:   No results found for this or any previous visit (from the past 24 hour(s)).

## 2024-01-29 NOTE — PLAN OF CARE
Oriented x4.  VSS.  IV fluids with intermittent zosyn antibiotics.  Ambulating independently within room.  Voiding via urinal.  No BM today but passing gas.  Abdominal cramping and discomfort controlled with heat packs and oxycodone x1.  Tolerating clear liquids.  Possible colonoscopy prep starting tomorrow.  Nursing will continue to monitor.

## 2024-01-29 NOTE — PROGRESS NOTES
Oriented x4.  VSS.  IV fluids with intermittent antibiotics.  Ambulating independently within room.  Voiding via urinal.   Abdominal cramping and discomfort controlled with heat packs.  Tolerating clear liquids, endorses feeling hungry.  Possible colonoscopy prep starting today.

## 2024-01-29 NOTE — PROGRESS NOTES
"[facility]  [facility]  Gastroenterology Progress note    Interval History:      Patient reports feeling improved.  Still with pain.  Tolerating clear liquids.      Vital Signs:      /54 (BP Location: Right arm)   Pulse 57   Temp 97.5  F (36.4  C) (Oral)   Resp 16   Ht 1.727 m (5' 8\")   Wt 65 kg (143 lb 4.8 oz)   SpO2 97%   BMI 21.79 kg/m    Temp (24hrs), Av.8  F (36.6  C), Min:97.5  F (36.4  C), Max:98.3  F (36.8  C)    Patient Vitals for the past 72 hrs:   Weight   24 0442 65 kg (143 lb 4.8 oz)   24 0600 65.3 kg (143 lb 15.4 oz)   24 1928 61.7 kg (136 lb)       Intake/Output Summary (Last 24 hours) at 2024 0903  Last data filed at 2024 0659  Gross per 24 hour   Intake 3856 ml   Output 1325 ml   Net 2531 ml         Constitutional: NAD, comfortable  Cardiovascular: RRR, normal S1, S2   Respiratory: CTAB  Abdomen: soft, + RLQ tenderness, nondistended    Additional Comments:  ROS, FH, SH: See initial GI consult for details.    Laboratory Data:  Recent Labs   Lab Test 24   WBC 8.2 9.7 5.2   HGB 11.9* 12.2* 9.6*   MCV 81 81 72*    279 264     Recent Labs   Lab Test 24  213    137 139   POTASSIUM 3.8 4.0 3.4   CHLORIDE 102 101 105   CO2 26 26 30   BUN 11.9 13.2 8   CR 1.19* 1.20* 1.03   ANIONGAP 8 10 4   JOSE MARIA 8.6 9.1 8.4*     Recent Labs   Lab Test 24  1609 215 10/02/20  0226 20  0340 18  1637 18   ALBUMIN 4.2  --  3.9 3.8 3.9   < >  --    BILITOTAL 0.3  --  0.4 0.2 0.2   < >  --    ALT 19  --  28 16 21   < >  --    AST 24  --  22 23 21   < >  --    ALKPHOS 57  --  63 67 69   < >  --    PROTEIN  --  Trace*  --   --   --   --  Negative   LIPASE 31  --  119  --  145   < >  --     < > = values in this interval not displayed.         Assessment:  29 yo male with history of Crohn's disease s/p resection " admitted with abdominal pain.  Crohn's diagnosed many years ago.  Course complicated by abscess.  Previously treated with methotrexate and Remicade and ended up with ileal hemicolectomy.  More recently not on any medication and lost to follow up.  Patient feeling unwell x 3 weeks.  Right lower quadrant discomfort with nausea and some vomiting.    CT showed inflammatory process developing in the right lower quadrant.  Cr 1.19.  CRP 17.21.  Hemoglobin 11.9.  Unclear if recurrent Crohn's vs inflammatory process related to this.  Antibiotics started.  Plan:  -  Clear liquid diet.  NPO after midnight.  -  Colonoscopy prep this afternoon.  -  Colonoscopy tomorrow.    Total Time Spent: 20 minutes    SHANA Vallecillo  [facility]  Cell:  [phone], not available after 11:30AM at this number  Dr. Redmond is covering the afternoon.  Please call her with questions after 11:30AM.

## 2024-01-29 NOTE — PROGRESS NOTES
COLON & RECTAL SURGERY  PROGRESS NOTE    January 29, 2024    SUBJECTIVE:  Says pain is improving. Passing gas, no BMs. Tolerating CLD denies n/v. AVSS.     OBJECTIVE:  Temp:  [97.5  F (36.4  C)-98.3  F (36.8  C)] 97.5  F (36.4  C)  Pulse:  [57-75] 57  Resp:  [16] 16  BP: (105-114)/(54-68) 106/54  SpO2:  [97 %-98 %] 97 %    Intake/Output Summary (Last 24 hours) at 1/29/2024 1003  Last data filed at 1/29/2024 0659  Gross per 24 hour   Intake 3856 ml   Output 1325 ml   Net 2531 ml       GENERAL:  Awake, alert, no acute distress  HEAD: Normocephalic atraumatic  SCLERA: Anicteric  EXTREMITIES: Warm and well perfused  ABDOMEN:  Soft, non-distended, minimally tender in RLQ. No guarding, rigidity, or peritoneal signs.     LABS:  Lab Results   Component Value Date    WBC 8.2 01/28/2024    WBC 10.6 01/04/2020     Lab Results   Component Value Date    HGB 11.9 01/28/2024    HGB 10.5 01/04/2020     Lab Results   Component Value Date    HCT 39.5 01/28/2024    HCT 37.1 01/04/2020     Lab Results   Component Value Date     01/28/2024     01/04/2020     Last Basic Metabolic Panel:  Lab Results   Component Value Date     01/28/2024     01/04/2020      Lab Results   Component Value Date    POTASSIUM 3.8 01/28/2024    POTASSIUM 3.4 12/01/2021    POTASSIUM 3.6 01/04/2020     Lab Results   Component Value Date    CHLORIDE 102 01/28/2024    CHLORIDE 105 12/01/2021    CHLORIDE 107 01/04/2020     Lab Results   Component Value Date    JOSE MARIA 8.6 01/28/2024    JOSE MARIA 8.6 01/04/2020     Lab Results   Component Value Date    CO2 26 01/28/2024    CO2 30 12/01/2021    CO2 24 01/04/2020     Lab Results   Component Value Date    BUN 11.9 01/28/2024    BUN 8 12/01/2021    BUN 16 01/04/2020     Lab Results   Component Value Date    CR 1.19 01/28/2024    CR 0.95 01/04/2020     Lab Results   Component Value Date    GLC 98 01/28/2024    GLC 96 12/01/2021     01/04/2020       ASSESSMENT/PLAN: 29 yo M with h/o Crohn's s/p  ileocecectomy in 2015 who presented with 2 weeks of lower abdominal pain, possibly due to Crohn's flare.     - Colonoscopy prep today and colonoscopy tomorrow per GI  - Clear liquid diet, NPO after midnight  - Zosyn  - PRN pain meds  - OOB, ambulate  - No indication for acute surgical intervention. Will await findings of colonoscopy tomorrow.  - DVT ppx    Discussed with Dr. Boyd.    For questions/paging, please contact the CRS office at [phone].    Ortiz Carreon PA-C  Colorectal Physician Assistant    [facility]  [address]  [place] [address]  T: [phone]  F: [phone]

## 2024-01-30 ENCOUNTER — ANESTHESIA (OUTPATIENT)
Dept: GASTROENTEROLOGY | Facility: CLINIC | Age: 29
End: 2024-01-30
Payer: COMMERCIAL

## 2024-01-30 LAB — COLONOSCOPY: NORMAL

## 2024-01-30 PROCEDURE — 258N000003 HC RX IP 258 OP 636: Performed by: ANESTHESIOLOGY

## 2024-01-30 PROCEDURE — 0DJD8ZZ INSPECTION OF LOWER INTESTINAL TRACT, VIA NATURAL OR ARTIFICIAL OPENING ENDOSCOPIC: ICD-10-PCS | Performed by: INTERNAL MEDICINE

## 2024-01-30 PROCEDURE — 999N000010 HC STATISTIC ANES STAT CODE-CRNA PER MINUTE: Performed by: INTERNAL MEDICINE

## 2024-01-30 PROCEDURE — 370N000017 HC ANESTHESIA TECHNICAL FEE, PER MIN: Performed by: INTERNAL MEDICINE

## 2024-01-30 PROCEDURE — 45378 DIAGNOSTIC COLONOSCOPY: CPT | Performed by: INTERNAL MEDICINE

## 2024-01-30 PROCEDURE — 120N000001 HC R&B MED SURG/OB

## 2024-01-30 PROCEDURE — 250N000011 HC RX IP 250 OP 636: Performed by: INTERNAL MEDICINE

## 2024-01-30 PROCEDURE — 250N000013 HC RX MED GY IP 250 OP 250 PS 637: Performed by: PHYSICIAN ASSISTANT

## 2024-01-30 PROCEDURE — 250N000011 HC RX IP 250 OP 636: Performed by: ANESTHESIOLOGY

## 2024-01-30 PROCEDURE — 250N000009 HC RX 250: Performed by: ANESTHESIOLOGY

## 2024-01-30 RX ORDER — PROPOFOL 10 MG/ML
INJECTION, EMULSION INTRAVENOUS CONTINUOUS PRN
Status: DISCONTINUED | OUTPATIENT
Start: 2024-01-30 | End: 2024-01-30

## 2024-01-30 RX ORDER — LIDOCAINE HYDROCHLORIDE 20 MG/ML
INJECTION, SOLUTION INFILTRATION; PERINEURAL PRN
Status: DISCONTINUED | OUTPATIENT
Start: 2024-01-30 | End: 2024-01-30

## 2024-01-30 RX ORDER — PROPOFOL 10 MG/ML
INJECTION, EMULSION INTRAVENOUS PRN
Status: DISCONTINUED | OUTPATIENT
Start: 2024-01-30 | End: 2024-01-30

## 2024-01-30 RX ORDER — LIDOCAINE 40 MG/G
CREAM TOPICAL
Status: DISCONTINUED | OUTPATIENT
Start: 2024-01-30 | End: 2024-01-30 | Stop reason: HOSPADM

## 2024-01-30 RX ORDER — ONDANSETRON 2 MG/ML
INJECTION INTRAMUSCULAR; INTRAVENOUS PRN
Status: DISCONTINUED | OUTPATIENT
Start: 2024-01-30 | End: 2024-01-30

## 2024-01-30 RX ORDER — ONDANSETRON 2 MG/ML
4 INJECTION INTRAMUSCULAR; INTRAVENOUS
Status: DISCONTINUED | OUTPATIENT
Start: 2024-01-30 | End: 2024-01-30

## 2024-01-30 RX ORDER — DEXMEDETOMIDINE HYDROCHLORIDE 4 UG/ML
INJECTION, SOLUTION INTRAVENOUS PRN
Status: DISCONTINUED | OUTPATIENT
Start: 2024-01-30 | End: 2024-01-30

## 2024-01-30 RX ORDER — SODIUM CHLORIDE, SODIUM LACTATE, POTASSIUM CHLORIDE, CALCIUM CHLORIDE 600; 310; 30; 20 MG/100ML; MG/100ML; MG/100ML; MG/100ML
INJECTION, SOLUTION INTRAVENOUS CONTINUOUS PRN
Status: DISCONTINUED | OUTPATIENT
Start: 2024-01-30 | End: 2024-01-30

## 2024-01-30 RX ADMIN — ENOXAPARIN SODIUM 40 MG: 40 INJECTION SUBCUTANEOUS at 08:21

## 2024-01-30 RX ADMIN — PROPOFOL 200 MCG/KG/MIN: 10 INJECTION, EMULSION INTRAVENOUS at 11:22

## 2024-01-30 RX ADMIN — PIPERACILLIN AND TAZOBACTAM 3.38 G: 3; .375 INJECTION, POWDER, FOR SOLUTION INTRAVENOUS at 13:42

## 2024-01-30 RX ADMIN — Medication 12 MCG: at 11:25

## 2024-01-30 RX ADMIN — LIDOCAINE HYDROCHLORIDE 50 MG: 20 INJECTION, SOLUTION INFILTRATION; PERINEURAL at 11:22

## 2024-01-30 RX ADMIN — ONDANSETRON 4 MG: 2 INJECTION INTRAMUSCULAR; INTRAVENOUS at 11:25

## 2024-01-30 RX ADMIN — SODIUM CHLORIDE, POTASSIUM CHLORIDE, SODIUM LACTATE AND CALCIUM CHLORIDE: 600; 310; 30; 20 INJECTION, SOLUTION INTRAVENOUS at 11:21

## 2024-01-30 RX ADMIN — MAGNESIUM CITRATE 296 ML: 1.75 LIQUID ORAL at 03:55

## 2024-01-30 RX ADMIN — PIPERACILLIN AND TAZOBACTAM 3.38 G: 3; .375 INJECTION, POWDER, FOR SOLUTION INTRAVENOUS at 20:18

## 2024-01-30 RX ADMIN — PIPERACILLIN AND TAZOBACTAM 3.38 G: 3; .375 INJECTION, POWDER, FOR SOLUTION INTRAVENOUS at 03:55

## 2024-01-30 RX ADMIN — PROPOFOL 30 MG: 10 INJECTION, EMULSION INTRAVENOUS at 11:25

## 2024-01-30 ASSESSMENT — ACTIVITIES OF DAILY LIVING (ADL)
ADLS_ACUITY_SCORE: 20
ADLS_ACUITY_SCORE: 37
ADLS_ACUITY_SCORE: 20
ADLS_ACUITY_SCORE: 37
ADLS_ACUITY_SCORE: 37
ADLS_ACUITY_SCORE: 20
ADLS_ACUITY_SCORE: 37
ADLS_ACUITY_SCORE: 37
ADLS_ACUITY_SCORE: 20
ADLS_ACUITY_SCORE: 20

## 2024-01-30 ASSESSMENT — ENCOUNTER SYMPTOMS: SEIZURES: 0

## 2024-01-30 NOTE — PROGRESS NOTES
Date & Time: 0700-1930  Surgery/POD#: Chron's flare   Behavior & Aggression: Green  Fall Risk: No  Orientation:x4  ABNL VS/O2:Room air  ABNL Labs: See chart  Pain Management:Denies  Bowel/Bladder: Continent   Drains: PIV  Diet:Clear liquid   Activity Level: Ind  Tests/Procedures: Colonoscopy tmrw  Anticipated  DC Date: Pending   Significant Information: Started colon prep this pm for a planned colonoscopy. To be NPO at midnight

## 2024-01-30 NOTE — PROGRESS NOTES
Date & Time: 1900-2330  Surgery/POD#: Chron's flare   Behavior & Aggression: Green  Fall Risk: No  Orientation:x4  ABNL VS/O2:Room air  ABNL Labs: See chart  Pain Management: reports pain low. Declines intervention, heat packs at bedside  Bowel/Bladder: Continent, on bowel prep, multiple BM  Drains: PIV  Diet:Clear liquid, NPO at midnight   Activity Level: Ind  Tests/Procedures: Colonoscopy tmrw  Anticipated  DC Date: Pending   Significant Information: Started colon prep this pm for a planned colonoscopy continues to work on finishing prep.

## 2024-01-30 NOTE — PROGRESS NOTES
COLON & RECTAL SURGERY  PROGRESS NOTE    January 30 , 2024    SUBJECTIVE: Fast asleep.Tolerated clears and a bowel prep.    OBJECTIVE:  Temp:  [97.5  F (36.4  C)-98.1  F (36.7  C)] 98  F (36.7  C)  Pulse:  [55-64] 55  Resp:  [8-16] 8  BP: (108-129)/(68-85) 129/85  SpO2:  [98 %-100 %] 98 %    Intake/Output Summary (Last 24 hours) at 1/29/2024 1003  Last data filed at 1/29/2024 0659  Gross per 24 hour   Intake 3856 ml   Output 1325 ml   Net 2531 ml       GENERAL: Asleep no acute distress  HEAD: Normocephalic atraumatic  SCLERA: Anicteric  EXTREMITIES: Warm and well perfused  ABDOMEN: Not examined today     LABS:  Lab Results   Component Value Date    WBC 8.2 01/28/2024    WBC 10.6 01/04/2020     Lab Results   Component Value Date    HGB 11.9 01/28/2024    HGB 10.5 01/04/2020     Lab Results   Component Value Date    HCT 39.5 01/28/2024    HCT 37.1 01/04/2020     Lab Results   Component Value Date     01/28/2024     01/04/2020     Last Basic Metabolic Panel:  Lab Results   Component Value Date     01/28/2024     01/04/2020      Lab Results   Component Value Date    POTASSIUM 3.8 01/28/2024    POTASSIUM 3.4 12/01/2021    POTASSIUM 3.6 01/04/2020     Lab Results   Component Value Date    CHLORIDE 102 01/28/2024    CHLORIDE 105 12/01/2021    CHLORIDE 107 01/04/2020     Lab Results   Component Value Date    JOSE MARIA 8.6 01/28/2024    JOSE MARIA 8.6 01/04/2020     Lab Results   Component Value Date    CO2 26 01/28/2024    CO2 30 12/01/2021    CO2 24 01/04/2020     Lab Results   Component Value Date    BUN 11.9 01/28/2024    BUN 8 12/01/2021    BUN 16 01/04/2020     Lab Results   Component Value Date    CR 1.19 01/28/2024    CR 0.95 01/04/2020     Lab Results   Component Value Date    GLC 98 01/28/2024    GLC 96 12/01/2021     01/04/2020       ASSESSMENT/PLAN: 27 yo M with h/o Crohn's s/p ileocecectomy in 2015 who presented with 2 weeks of lower abdominal pain, possibly due to Crohn's flare.      Colonoscopy today  Await colonoscopy results  On Zosyn  On DVT prophylaxis with Lovenox  Will continue to follow    Discussed with Dr. Deb Curry MD  Fellow, Colon and Rectal Surgery  [facility]  Pager: [phone]    CRS Staff    Discussed with Dr. Curry.  Agree with above.  Garry Boyd MD FACS FASCRS  Colorectal Surgeon       [facility]  [address], [address]  [place] [address]  T: [phone]  F: [phone]  Pager: [phone]  [email or web address]

## 2024-01-30 NOTE — PROGRESS NOTES
PRE-PROCEDURE NOTE    CHIEF COMPLAINT / REASON FOR PROCEDURE:  RLQ pain, history of Crohn's    History and Physical Reviewed:  yes    PRE-SEDATION ASSESSMENT:    Lung Exam:  normal  Heart Exam:  normal  Mallampati Airway Classification:  3  Previous reaction to anesthesia/sedation:   no  Sedation plan based on assessment:  MAC  Comment(s):  risks explained  ASA Classification:  3    IMPRESSION:  rule out active crohn's    PLAN:  colonoscopy       Elena Redmond MD, MD

## 2024-01-30 NOTE — PROGRESS NOTES
[facility] Brief Note    Colonoscopy completed.    Findings:  Normal appearing colon  Normal appearing anastomosis  Tight angle to get scope into ileum, but ileal mucosa appeared normal for at least 12cm    A/P:  No evidence of active Crohn's disease.  - continue antibiotics to complete course (? 7 days)  - clear liquid diet, can advance as tolerated    Elena Redmond MD  [facility]  [phone]  Cell [phone]

## 2024-01-30 NOTE — ANESTHESIA POSTPROCEDURE EVALUATION
Patient: Giovanni Carranza    Procedure: Procedure(s):  Colonoscopy       Anesthesia Type:  MAC    Note:     Postop Pain Control:    PONV: No   Neuro/Psych: Uneventful            Sign Out: Acceptable/Baseline neuro status   Airway/Respiratory: Uneventful            Sign Out: Acceptable/Baseline resp. status   CV/Hemodynamics: Uneventful            Sign Out: Acceptable CV status; No obvious hypovolemia; No obvious fluid overload   Other NRE: NONE   DID A NON-ROUTINE EVENT OCCUR? No           Last vitals:  Vitals Value Taken Time   /90 01/30/24 1343   Temp     Pulse 61 01/30/24 1300   Resp 11 01/30/24 1206   SpO2 99 % 01/30/24 1343   Vitals shown include unfiled device data.    Electronically Signed By: Jonathon Sparrow MD  January 30, 2024  2:47 PM

## 2024-01-30 NOTE — ANESTHESIA CARE TRANSFER NOTE
Patient: Giovanni Carranza    Procedure: Procedure(s):  Colonoscopy       Diagnosis: Abnormal CT scan [R93.89]  Abdominal pain [R10.9]  Diagnosis Additional Information: No value filed.    Anesthesia Type:   MAC     Note:    Oropharynx: oropharynx clear of all foreign objects and spontaneously breathing  Level of Consciousness: drowsy  Oxygen Supplementation: room air    Independent Airway: airway patency satisfactory and stable  Dentition: dentition unchanged  Vital Signs Stable: post-procedure vital signs reviewed and stable  Report to RN Given: handoff report given  Destination: endo recovery.          Vitals:  Vitals Value Taken Time   BP     Temp     Pulse 62 01/30/24 1140   Resp 6 01/30/24 1140   SpO2 98 % 01/30/24 1140       Electronically Signed By: AGNES Becker CRNA  January 30, 2024  11:41 AM

## 2024-01-30 NOTE — ANESTHESIA PREPROCEDURE EVALUATION
"Anesthesia Pre-Procedure Evaluation    Patient: Giovanni Carranza   MRN: [id number] : 1995        Procedure : Procedure(s):  Colonoscopy          Past Medical History:   Diagnosis Date    Crohn's disease (H)       Past Surgical History:   Procedure Laterality Date    COLONOSCOPY      COMBINED CYSTOSCOPY, INSERT CATHETER URETER Bilateral 3/23/2015    Procedure: COMBINED CYSTOSCOPY, INSERT CATHETER URETER;  Surgeon: Viridiana Castro MD;  Location: [facility] OR    LAPAROSCOPIC RESECTION ILEOCECAL N/A 3/23/2015    Procedure: LAPAROSCOPIC RESECTION ILEOCECAL;  Surgeon: Marya Leon MD;  Location: [facility] OR    LAPAROTOMY EXPLORATORY N/A 3/25/2015    Procedure: LAPAROTOMY EXPLORATORY;  Surgeon: Marya Leon MD;  Location: [facility] OR    OTHER SURGICAL HISTORY  2015    LAPAROSCOPIC RESECTION ILEOCECAL    OTHER SURGICAL HISTORY  2015    COMBINED CYSTOSCOPY, INSERT CATHETER URETER    SMALL INTESTINE SURGERY        Allergies   Allergen Reactions    Percocet [Oxycodone-Acetaminophen] Other (See Comments)     \"feels funny\"      Social History     Tobacco Use    Smoking status: Never    Smokeless tobacco: Never   Substance Use Topics    Alcohol use: No      Wt Readings from Last 1 Encounters:   24 65 kg (143 lb 4.8 oz)        CT abdomen  EXAM: CT ABDOMEN PELVIS W CONTRAST  LOCATION: [facility]  DATE: 2024     INDICATION: RLQ abdominal pain  COMPARISON: 2021, 10/02/2020  TECHNIQUE: CT scan of the abdomen and pelvis was performed following injection of IV contrast. Multiplanar reformats were obtained. Dose reduction techniques were used.  CONTRAST: 69  ml Isovue 370     FINDINGS:      LOWER CHEST: Unremarkable.     HEPATOBILIARY: Normal liver parenchyma. No biliary dilation. Normal gallbladder.     PANCREAS: Normal.     SPLEEN: Normal.     ADRENAL GLANDS: Normal.     KIDNEYS/BLADDER: Normal kidneys. No hydronephrosis. Normal urinary bladder.      BOWEL: Prior bowel " resection the right lower quadrant. There is a rounded area of soft tissue attenuation in the right lower quadrant near the bowel anastomosis measuring 1.6 x 1.6 cm. This finding was not definitely present on prior CTs from 2020 and   2021. No bowel obstruction. Appendix is surgically absent. No pneumatosis or pneumoperitoneum. No abdominal free fluid.      LYMPH NODES: No pathologically enlarged lymph nodes.     VASCULATURE: Nonaneurysmal aorta.      PELVIC ORGANS: No pelvic masses.     MUSCULOSKELETAL: No acute osseous abnormalities.                                                                      IMPRESSION:  1.  Indeterminate 1.6 cm soft tissue attenuation focus along the bowel anastomosis margin in the right lower quadrant is new from prior, possibly focal wall inflammation, scar tissue, or other etiology. Recommend correlation with clinical history and   short interval follow-up CT in 3 months.  2.  Otherwise, no acute abnormalities in the abdomen or pelvis.  Anesthesia Evaluation   Pt has had prior anesthetic.     No history of anesthetic complications       ROS/MED HX  ENT/Pulmonary:  - neg pulmonary ROS  (-) sleep apnea and recent URI   Neurologic:    (-) no seizures, no CVA and migraines   Cardiovascular:  - neg cardiovascular ROS     METS/Exercise Tolerance:     Hematologic:  - neg hematologic  ROS     Musculoskeletal:  - neg musculoskeletal ROS     GI/Hepatic: Comment: Abdominal pain/ cramping     (+)       Inflammatory bowel disease (Crohn's disease s/p ileocecal resection),             Renal/Genitourinary:  - neg Renal ROS     Endo:  - neg endo ROS  (-) Type II DM and thyroid disease   Psychiatric/Substance Use:  - neg psychiatric ROS     Infectious Disease: Comment: ESBL - neg infectious disease ROS     Malignancy:       Other:            Physical Exam    Airway  airway exam normal      Mallampati: I   TM distance: > 3 FB   Neck ROM: full   Mouth opening: > 3 cm    Respiratory Devices and  "Support         Dental       (+) Minor Abnormalities - some fillings, tiny chips      Cardiovascular   cardiovascular exam normal       Rhythm and rate: regular and normal     Pulmonary   pulmonary exam normal        breath sounds clear to auscultation           OUTSIDE LABS:  CBC:   Lab Results   Component Value Date    WBC 8.2 01/28/2024    WBC 9.7 01/27/2024    HGB 11.9 (L) 01/28/2024    HGB 12.2 (L) 01/27/2024    HCT 39.5 (L) 01/28/2024    HCT 39.5 (L) 01/27/2024     01/28/2024     01/27/2024     BMP:   Lab Results   Component Value Date     01/28/2024     01/27/2024    POTASSIUM 3.8 01/28/2024    POTASSIUM 4.0 01/27/2024    CHLORIDE 102 01/28/2024    CHLORIDE 101 01/27/2024    CO2 26 01/28/2024    CO2 26 01/27/2024    BUN 11.9 01/28/2024    BUN 13.2 01/27/2024    CR 1.19 (H) 01/28/2024    CR 1.20 (H) 01/27/2024    GLC 98 01/28/2024    GLC 88 01/27/2024     COAGS:   Lab Results   Component Value Date    INR 1.12 03/25/2015     POC: No results found for: \"BGM\", \"HCG\", \"HCGS\"  HEPATIC:   Lab Results   Component Value Date    ALBUMIN 4.2 01/27/2024    PROTTOTAL 7.9 01/27/2024    ALT 19 01/27/2024    AST 24 01/27/2024    ALKPHOS 57 01/27/2024    BILITOTAL 0.3 01/27/2024     OTHER:   Lab Results   Component Value Date    LACT 0.7 10/02/2020    A1C 5.5 07/27/2023    JOSE MARIA 8.6 01/28/2024    PHOS 3.2 03/24/2015    MAG 2.0 03/29/2015    LIPASE 31 01/27/2024    CRP <0.1 10/02/2020    SED 6 08/27/2020       Anesthesia Plan    ASA Status:  2    NPO Status:  NPO Appropriate    Anesthesia Type: MAC.     - Reason for MAC: straight local not clinically adequate              Consents    Anesthesia Plan(s) and associated risks, benefits, and realistic alternatives discussed. Questions answered and patient/representative(s) expressed understanding.     - Discussed:     - Discussed with:  Patient            Postoperative Care    Pain management: IV analgesics.        Comments:               Jonathon COATS" MD Kyara    I have reviewed the pertinent notes and labs in the chart from the past 30 days and (re)examined the patient.  Any updates or changes from those notes are reflected in this note.

## 2024-01-30 NOTE — PLAN OF CARE
Date & Time: 2300-0730.  here with Crohn's disease complication.  Behavior & Aggression: Green - no concerns.  Fall Risk: No.  Orientation:A&Ox4.  ABNL VS/O2:VSS on RA.  ABNL Labs: see chart.  Pain Management: Denies pain this shift.  Bowel/Bladder: Continent of B/B, active bowel sounds, completed bowel prep around 1am - multiple loose BMs overnight.  IV/Drains/Skin: PIV SL. Skin WDL.  Diet: NPO since midnight.  Activity Level: IND.  Tests/Procedures: Plan for Colonoscopy sometime today.  Anticipated  DC Date: TBD.

## 2024-01-31 VITALS
HEIGHT: 68 IN | RESPIRATION RATE: 16 BRPM | SYSTOLIC BLOOD PRESSURE: 120 MMHG | HEART RATE: 56 BPM | DIASTOLIC BLOOD PRESSURE: 76 MMHG | WEIGHT: 143.3 LBS | BODY MASS INDEX: 21.72 KG/M2 | TEMPERATURE: 97.8 F | OXYGEN SATURATION: 97 %

## 2024-01-31 LAB
CREAT SERPL-MCNC: 1.31 MG/DL (ref 0.67–1.17)
EGFRCR SERPLBLD CKD-EPI 2021: 77 ML/MIN/1.73M2
PLATELET # BLD AUTO: 250 10E3/UL (ref 150–450)

## 2024-01-31 PROCEDURE — 82565 ASSAY OF CREATININE: CPT | Performed by: INTERNAL MEDICINE

## 2024-01-31 PROCEDURE — 99239 HOSP IP/OBS DSCHRG MGMT >30: CPT | Performed by: INTERNAL MEDICINE

## 2024-01-31 PROCEDURE — 85049 AUTOMATED PLATELET COUNT: CPT | Performed by: INTERNAL MEDICINE

## 2024-01-31 PROCEDURE — 250N000011 HC RX IP 250 OP 636: Performed by: INTERNAL MEDICINE

## 2024-01-31 PROCEDURE — 36415 COLL VENOUS BLD VENIPUNCTURE: CPT | Performed by: INTERNAL MEDICINE

## 2024-01-31 RX ORDER — CIPROFLOXACIN 500 MG/1
500 TABLET, FILM COATED ORAL EVERY 12 HOURS
Qty: 6 TABLET | Refills: 0 | Status: SHIPPED | OUTPATIENT
Start: 2024-01-31 | End: 2024-02-03

## 2024-01-31 RX ORDER — METRONIDAZOLE 500 MG/1
500 TABLET ORAL 3 TIMES DAILY
Qty: 9 TABLET | Refills: 0 | Status: SHIPPED | OUTPATIENT
Start: 2024-01-31 | End: 2024-02-03

## 2024-01-31 RX ORDER — CIPROFLOXACIN 500 MG/1
500 TABLET, FILM COATED ORAL EVERY 12 HOURS SCHEDULED
Status: DISCONTINUED | OUTPATIENT
Start: 2024-01-31 | End: 2024-01-31 | Stop reason: HOSPADM

## 2024-01-31 RX ORDER — METRONIDAZOLE 500 MG/1
500 TABLET ORAL 3 TIMES DAILY
Status: DISCONTINUED | OUTPATIENT
Start: 2024-01-31 | End: 2024-01-31 | Stop reason: HOSPADM

## 2024-01-31 RX ADMIN — PIPERACILLIN AND TAZOBACTAM 3.38 G: 3; .375 INJECTION, POWDER, FOR SOLUTION INTRAVENOUS at 02:12

## 2024-01-31 RX ADMIN — PIPERACILLIN AND TAZOBACTAM 3.38 G: 3; .375 INJECTION, POWDER, FOR SOLUTION INTRAVENOUS at 08:27

## 2024-01-31 RX ADMIN — ENOXAPARIN SODIUM 40 MG: 40 INJECTION SUBCUTANEOUS at 08:26

## 2024-01-31 ASSESSMENT — ACTIVITIES OF DAILY LIVING (ADL)
ADLS_ACUITY_SCORE: 20

## 2024-01-31 NOTE — PROGRESS NOTES
VSS. A/O. Ind. Denies pain. Tolerating diet. Colonoscopy done today. Pt states he lost his apple Airpods on 28th day he was transfer from ED.

## 2024-01-31 NOTE — DISCHARGE SUMMARY
[facility]  Hospitalist Discharge Summary      Date of Admission:  1/27/2024  Date of Discharge:  1/31/2024  Discharging Provider: Lorrie Foreman MD  Discharge Service: Hospitalist Service    Discharge Diagnoses    Principal Problem:    Crohn's disease with complication, unspecified gastrointestinal tract location (H)  Active Problems:    Right lower quadrant abdominal pain        Clinically Significant Risk Factors          Follow-ups Needed After Discharge   Follow-up Appointments     Follow-up and recommended labs and tests       Follow up with primary care provider, [facility], within 7 days for hospital follow- up.  No follow up labs or test   are needed.             Unresulted Labs Ordered in the Past 30 Days of this Admission       Date and Time Order Name Status Description    1/27/2024 10:04 PM Blood Culture Peripheral Blood Preliminary     1/27/2024 10:04 PM Blood Culture Peripheral Blood Preliminary         These results will be followed up by      Discharge Disposition   Discharged to home  Condition at discharge: Stable    Hospital Course    Summary: Giovanni Carranza is a 28 year old male with PMH Crohn's disease with ileocecal resection who was admitted on 1/27/2024 with progressive RLQ cramping and pain.     Crohn's disease  History of ileocecal resection 3/2015, not currently on any treatment  Reports appendix removed with prior surgery.   Presents with 2-3 weeks intermittent RLQ pain, intermittent nausea.   CT abd/pelvis with indeterminate 1.6 cm soft tissue attenuation focus along the bowel anastomosis margin in RLQ. Having normal stools. -   - Colonoscopy 1/30/24 normal (no active Crohns).  Tight angle to get scope into ileum, but ileal mucosa appeared normal for at least 12cm     - Appreciate [facility] consult                -   Zosyn and IVFF (  1/28/24 - 1/31/24)      -- taking orals by 1/20/24                -- change to oral Abx today  (Cipro/flagyl) starting 1/31/24  (last day abx could be about 2/3/24)  - Colorectal surgery consult appreciated.   - tolerating low fiber diet with normal BMs on day of discharge.       Right Lower Abdominal pain due to Infectious Enteritis or due to tight ileocecal anastomosis.   Findings as above       Consultations This Hospital Stay   SURGERY GENERAL IP CONSULT  GASTROENTEROLOGY IP CONSULT  COLORECTAL SURGERY IP CONSULT    Code Status   Full Code    Time Spent on this Encounter   I, Lorrie Foreman MD, personally saw the patient today and spent greater than 30 minutes discharging this patient.       Lorrie Foreman MD  [facility] SURGERY  [address] [facility] [address]  Phone: [phone]  Fax: [phone]  ______________________________________________________________________    Physical Exam   Vital Signs: Temp: 97.8  F (36.6  C) Temp src: Oral BP: 120/76 Pulse: 56   Resp: 16 SpO2: 97 % O2 Device: None (Room air)    Weight: 143 lbs 4.78 oz  Constitutional: awake, alert, cooperative, no apparent distress, and appears stated age  Respiratory: No increased work of breathing, good air exchange, clear to auscultation bilaterally, no crackles or wheezing  Cardiovascular: Normal apical impulse, regular rate and rhythm, normal S1 and S2, no S3 or S4, and no murmur noted  GI:  normal bowel sounds, soft, non-distended, non-tender, no masses palpated, no hepatosplenomegally  Musculoskeletal: normal muscle tone and bulk  no lower extremity pitting edema present  Neuropsychiatric: General: normal, calm, and normal eye contact  Affect: normal and pleasant  Memory and insight: normal, memory for past and recent events intact, and thought process normal       Primary Care Physician   [facility]    Discharge Orders      Reason for your hospital stay    Intraabdominal infection and/or blockage.     Follow-up and recommended labs and tests     Follow  up with primary care provider[facility], within 7 days for hospital follow- up.  No follow up labs or test are needed.     Activity    Your activity upon discharge: activity as tolerated     Diet    Follow this diet upon discharge: Orders Placed This Encounter      Low Fiber Diet       Significant Results and Procedures   Most Recent 3 CBC's:  Recent Labs   Lab Test 01/31/24  0701 01/28/24 0626 01/27/24 1935 12/01/21 2135   WBC  --  8.2 9.7 5.2   HGB  --  11.9* 12.2* 9.6*   MCV  --  81 81 72*    211 279 264     Most Recent 3 BMP's:  Recent Labs   Lab Test 01/31/24  0701 01/28/24 0626 01/27/24 1935 12/01/21 2135   NA  --  136 137 139   POTASSIUM  --  3.8 4.0 3.4   CHLORIDE  --  102 101 105   CO2  --  26 26 30   BUN  --  11.9 13.2 8   CR 1.31* 1.19* 1.20* 1.03   ANIONGAP  --  8 10 4   JOSE MARIA  --  8.6 9.1 8.4*   GLC  --  98 88 96     Most Recent 2 LFT's:  Recent Labs   Lab Test 01/27/24 1935 12/01/21 2135   AST 24 22   ALT 19 28   ALKPHOS 57 63   BILITOTAL 0.3 0.4     Most Recent TSH and T4:No lab results found.,   Results for orders placed or performed during the hospital encounter of 01/27/24   CT Abdomen Pelvis w Contrast    Narrative    EXAM: CT ABDOMEN PELVIS W CONTRAST  LOCATION: [facility]  DATE: 1/27/2024    INDICATION: RLQ abdominal pain  COMPARISON: 12/20/2021, 10/02/2020  TECHNIQUE: CT scan of the abdomen and pelvis was performed following injection of IV contrast. Multiplanar reformats were obtained. Dose reduction techniques were used.  CONTRAST: 69  ml Isovue 370    FINDINGS:     LOWER CHEST: Unremarkable.     HEPATOBILIARY: Normal liver parenchyma. No biliary dilation. Normal gallbladder.     PANCREAS: Normal.     SPLEEN: Normal.     ADRENAL GLANDS: Normal.     KIDNEYS/BLADDER: Normal kidneys. No hydronephrosis. Normal urinary bladder.      BOWEL: Prior bowel resection the right lower quadrant. There is a rounded area of soft tissue  "attenuation in the right lower quadrant near the bowel anastomosis measuring 1.6 x 1.6 cm. This finding was not definitely present on prior CTs from 2020 and   2021. No bowel obstruction. Appendix is surgically absent. No pneumatosis or pneumoperitoneum. No abdominal free fluid.     LYMPH NODES: No pathologically enlarged lymph nodes.    VASCULATURE: Nonaneurysmal aorta.     PELVIC ORGANS: No pelvic masses.     MUSCULOSKELETAL: No acute osseous abnormalities.       Impression    IMPRESSION:  1.  Indeterminate 1.6 cm soft tissue attenuation focus along the bowel anastomosis margin in the right lower quadrant is new from prior, possibly focal wall inflammation, scar tissue, or other etiology. Recommend correlation with clinical history and   short interval follow-up CT in 3 months.  2.  Otherwise, no acute abnormalities in the abdomen or pelvis.       Discharge Medications   Current Discharge Medication List        START taking these medications    Details   ciprofloxacin (CIPRO) 500 MG tablet Take 1 tablet (500 mg) by mouth every 12 hours for 3 days  Qty: 6 tablet, Refills: 0    Associated Diagnoses: Crohn's disease of both small and large intestine with other complication (H)      metroNIDAZOLE (FLAGYL) 500 MG tablet Take 1 tablet (500 mg) by mouth 3 times daily for 3 days  Qty: 9 tablet, Refills: 0    Associated Diagnoses: Crohn's disease of both small and large intestine with other complication (H)           Allergies   Allergies   Allergen Reactions    Percocet [Oxycodone-Acetaminophen] Other (See Comments)     \"feels funny\"     "

## 2024-01-31 NOTE — PLAN OF CARE
Date & Time: 1/31/24, 2161-0786  Summary: Here with Crohn's flare, Colonoscopy on 1/30   Behavior & Aggression: Green  Fall Risk: No  Orientation: A&Ox4   ABNL VS/O2:VSS on RA   Pain Management: Denies   Bowel/Bladder: Continent. Voiding adequately, passing gasDrains: No PIV access  Diet:Tolerating low fiber   Activity Level: IND   Significant Information: Plan to discharge home. AVS reviewed. Education  provided, understanding verbalized. Discharged now with abx.

## 2024-01-31 NOTE — PROGRESS NOTES
Colorectal Surgery Progress Note      Interval History:  No acute events overnight.  Tolerating clears wanting more solid food this morning.  Has been feeling well.  No abdominal pain or nausea reported.    Physical Exam:   Temp:  [97.6  F (36.4  C)-98.1  F (36.7  C)] 97.8  F (36.6  C)  Pulse:  [51-65] 56  Resp:  [6-29] 16  BP: (108-134)/(66-90) 120/76  SpO2:  [96 %-99 %] 97 %  General: Alert, oriented, appears comfortable, NAD.  Respiratory: breathing non labored  Abdomen: Abdomen is soft, non-tender, non-distended.    Data:   All laboratory and imaging data in the past 24 hours reviewed  I/O last 3 completed shifts:  In: 900 [P.O.:700; I.V.:200]  Out: -   Recent Labs   Lab Test 01/31/24  0701 01/28/24  0626 01/27/24 1935 12/01/21  2135   WBC  --  8.2 9.7 5.2   HGB  --  11.9* 12.2* 9.6*    211 279 264      Recent Labs   Lab Test 01/31/24  0701 01/28/24  0626 01/27/24 1935 12/01/21  2135   NA  --  136 137 139   POTASSIUM  --  3.8 4.0 3.4   CHLORIDE  --  102 101 105   CO2  --  26 26 30   BUN  --  11.9 13.2 8   CR 1.31* 1.19* 1.20* 1.03   ANIONGAP  --  8 10 4   JOSE MARIA  --  8.6 9.1 8.4*   GLC  --  98 88 96        Recent Labs   Lab Test 01/27/24 1935   PROTTOTAL 7.9   ALBUMIN 4.2   BILITOTAL 0.3   ALKPHOS 57   AST 24   ALT 19       Assessment and Plan:     28-year-old with history of Crohn's status post ileocecectomy 2015 presented with 2 weeks of lower abdominal pain.  He was started on antibiotics.  He underwent colonoscopy yesterday that was negative for Crohn's flare.  His pain has since subsided and his symptoms have resolved.  Plan    Okay to advance diet as tolerated   no colorectal follow-up required   patient will follow-up with GI For his Crohn's management moving forward   Abx per primary team     discussed with Dr. Deb Curry MD  Fellow, Colon and Rectal Surgery  [facility]  Pager: [phone]

## 2024-01-31 NOTE — PROGRESS NOTES
"[facility]  [facility]  Gastroenterology Progress note    Interval History:      Tolerating diet, feeling well.      Vital Signs:      /76 (BP Location: Left arm)   Pulse 56   Temp 97.8  F (36.6  C) (Oral)   Resp 16   Ht 1.727 m (5' 8\")   Wt 65 kg (143 lb 4.8 oz)   SpO2 97%   BMI 21.79 kg/m    Temp (24hrs), Av.8  F (36.6  C), Min:97.5  F (36.4  C), Max:98.3  F (36.8  C)    Patient Vitals for the past 72 hrs:   Weight   24 0442 65 kg (143 lb 4.8 oz)       Intake/Output Summary (Last 24 hours) at 2024 0903  Last data filed at 2024 0659  Gross per 24 hour   Intake 3856 ml   Output 1325 ml   Net 2531 ml         Constitutional: NAD, comfortable  Abdomen:  nondistended    Additional Comments:  ROS, FH, SH: See initial GI consult for details.    Laboratory Data:  Recent Labs   Lab Test 24  0724  0624   WBC  --  8.2 9.7 5.2   HGB  --  11.9* 12.2* 9.6*   MCV  --  81 81 72*    211 279 264     Recent Labs   Lab Test 24  0724  0624   NA  --  136 137 139   POTASSIUM  --  3.8 4.0 3.4   CHLORIDE  --  102 101 105   CO2  --  26 26 30   BUN  --  11.9 13.2 8   CR 1.31* 1.19* 1.20* 1.03   ANIONGAP  --  8 10 4   JOSE MARIA  --  8.6 9.1 8.4*     Recent Labs   Lab Test 24  1609 21  2135 10/02/20  0226 20  0340 18  1637 18   ALBUMIN 4.2  --  3.9 3.8 3.9   < >  --    BILITOTAL 0.3  --  0.4 0.2 0.2   < >  --    ALT 19  --  28 16 21   < >  --    AST 24  --  22 23 21   < >  --    ALKPHOS 57  --  63 67 69   < >  --    PROTEIN  --  Trace*  --   --   --   --  Negative   LIPASE 31  --  119  --  145   < >  --     < > = values in this interval not displayed.         Assessment:  29 yo male with history of Crohn's disease s/p resection admitted with abdominal pain.  Crohn's diagnosed many years ago.  Previously treated with methotrexate and " Remicade and ended up with ileal hemicolectomy several years ago.  More recently not on any medication.  Last colonoscopy and MRE unremarkable spring 2023.  CT with question of inflammatory process in RLQ. Colonoscopy 1/30/24 unremarkable with no evidence of active Crohn's.  Plan:  -  continue diet  - recommend transition to oral antibiotics cipro/flagyl? To complete course 2/2/24  - outpatient follow up with GI IBD clinic    I am ok with discharge to home today.    Total Time Spent: 20 minutes    Elena Redmond MD  [facility]  [phone]  Cell [phone]

## 2024-01-31 NOTE — PLAN OF CARE
Date & Time: 2300-0700  Summary: Here with Crohn's flare, Colonoscopy on 1/30   Behavior & Aggression: Green  Fall Risk: No  Orientation: A&Ox4   ABNL VS/O2:VSS on RA   Pain Management: Denies   Bowel/Bladder: Continent. Voiding adequately, passing gas, no BM   Drains: PIV SL   Diet:Tolerating full liquid   Activity Level: IND   Anticipated  DC Date: Pending

## 2024-02-02 LAB
BACTERIA BLD CULT: NO GROWTH
BACTERIA BLD CULT: NO GROWTH

## 2024-06-13 ENCOUNTER — HOSPITAL ENCOUNTER (EMERGENCY)
Facility: CLINIC | Age: 29
Discharge: HOME | End: 2024-06-13
Attending: EMERGENCY MEDICINE | Admitting: EMERGENCY MEDICINE

## 2024-06-13 ENCOUNTER — APPOINTMENT (OUTPATIENT)
Dept: CT IMAGING | Facility: CLINIC | Age: 29
End: 2024-06-13
Attending: EMERGENCY MEDICINE

## 2024-06-13 VITALS
HEART RATE: 78 BPM | SYSTOLIC BLOOD PRESSURE: 119 MMHG | TEMPERATURE: 98 F | BODY MASS INDEX: 21.22 KG/M2 | HEIGHT: 68 IN | WEIGHT: 140 LBS | DIASTOLIC BLOOD PRESSURE: 72 MMHG | OXYGEN SATURATION: 98 % | RESPIRATION RATE: 20 BRPM

## 2024-06-13 DIAGNOSIS — R10.2 SUPRAPUBIC ABDOMINAL PAIN: ICD-10-CM

## 2024-06-13 DIAGNOSIS — R31.9 HEMATURIA, UNSPECIFIED TYPE: Primary | ICD-10-CM

## 2024-06-13 LAB
ALBUMIN SERPL BCG-MCNC: 3.9 G/DL (ref 3.5–5.2)
ALBUMIN UR-MCNC: 20 MG/DL
ALP SERPL-CCNC: 54 U/L (ref 40–150)
ALT SERPL W P-5'-P-CCNC: 19 U/L (ref 0–70)
ANION GAP SERPL CALCULATED.3IONS-SCNC: 10 MMOL/L (ref 7–15)
APPEARANCE UR: CLEAR
AST SERPL W P-5'-P-CCNC: 23 U/L (ref 0–45)
BASOPHILS # BLD AUTO: 0 10E3/UL (ref 0–0.2)
BASOPHILS NFR BLD AUTO: 1 %
BILIRUB SERPL-MCNC: 0.2 MG/DL
BILIRUB UR QL STRIP: NEGATIVE
BUN SERPL-MCNC: 11.5 MG/DL (ref 6–20)
CALCIUM SERPL-MCNC: 8.5 MG/DL (ref 8.6–10)
CHLORIDE SERPL-SCNC: 104 MMOL/L (ref 98–107)
COLOR UR AUTO: ABNORMAL
CREAT SERPL-MCNC: 1.15 MG/DL (ref 0.67–1.17)
DEPRECATED HCO3 PLAS-SCNC: 25 MMOL/L (ref 22–29)
EGFRCR SERPLBLD CKD-EPI 2021: 89 ML/MIN/1.73M2
EOSINOPHIL # BLD AUTO: 0.3 10E3/UL (ref 0–0.7)
EOSINOPHIL NFR BLD AUTO: 5 %
ERYTHROCYTE [DISTWIDTH] IN BLOOD BY AUTOMATED COUNT: 14.4 % (ref 10–15)
GLUCOSE SERPL-MCNC: 93 MG/DL (ref 70–99)
GLUCOSE UR STRIP-MCNC: NEGATIVE MG/DL
HCT VFR BLD AUTO: 33.4 % (ref 40–53)
HGB BLD-MCNC: 10 G/DL (ref 13.3–17.7)
HGB UR QL STRIP: ABNORMAL
IMM GRANULOCYTES # BLD: 0 10E3/UL
IMM GRANULOCYTES NFR BLD: 0 %
KETONES UR STRIP-MCNC: NEGATIVE MG/DL
LEUKOCYTE ESTERASE UR QL STRIP: NEGATIVE
LIPASE SERPL-CCNC: 43 U/L (ref 13–60)
LYMPHOCYTES # BLD AUTO: 2.9 10E3/UL (ref 0.8–5.3)
LYMPHOCYTES NFR BLD AUTO: 48 %
MCH RBC QN AUTO: 24.5 PG (ref 26.5–33)
MCHC RBC AUTO-ENTMCNC: 29.9 G/DL (ref 31.5–36.5)
MCV RBC AUTO: 82 FL (ref 78–100)
MONOCYTES # BLD AUTO: 0.6 10E3/UL (ref 0–1.3)
MONOCYTES NFR BLD AUTO: 10 %
MUCOUS THREADS #/AREA URNS LPF: PRESENT /LPF
NEUTROPHILS # BLD AUTO: 2.2 10E3/UL (ref 1.6–8.3)
NEUTROPHILS NFR BLD AUTO: 36 %
NITRATE UR QL: NEGATIVE
NRBC # BLD AUTO: 0 10E3/UL
NRBC BLD AUTO-RTO: 0 /100
PH UR STRIP: 7.5 [PH] (ref 5–7)
PLATELET # BLD AUTO: 287 10E3/UL (ref 150–450)
POTASSIUM SERPL-SCNC: 3.8 MMOL/L (ref 3.4–5.3)
PROT SERPL-MCNC: 7 G/DL (ref 6.4–8.3)
RBC # BLD AUTO: 4.08 10E6/UL (ref 4.4–5.9)
RBC URINE: 26 /HPF
SODIUM SERPL-SCNC: 139 MMOL/L (ref 135–145)
SP GR UR STRIP: 1.02 (ref 1–1.03)
SQUAMOUS EPITHELIAL: <1 /HPF
UROBILINOGEN UR STRIP-MCNC: NORMAL MG/DL
WBC # BLD AUTO: 6 10E3/UL (ref 4–11)
WBC URINE: 1 /HPF

## 2024-06-13 PROCEDURE — 36415 COLL VENOUS BLD VENIPUNCTURE: CPT | Performed by: EMERGENCY MEDICINE

## 2024-06-13 PROCEDURE — 80053 COMPREHEN METABOLIC PANEL: CPT | Performed by: EMERGENCY MEDICINE

## 2024-06-13 PROCEDURE — 83690 ASSAY OF LIPASE: CPT | Performed by: EMERGENCY MEDICINE

## 2024-06-13 PROCEDURE — 99285 EMERGENCY DEPT VISIT HI MDM: CPT | Mod: 25

## 2024-06-13 PROCEDURE — 96361 HYDRATE IV INFUSION ADD-ON: CPT

## 2024-06-13 PROCEDURE — 250N000009 HC RX 250: Performed by: EMERGENCY MEDICINE

## 2024-06-13 PROCEDURE — 96374 THER/PROPH/DIAG INJ IV PUSH: CPT | Mod: 59

## 2024-06-13 PROCEDURE — 85025 COMPLETE CBC W/AUTO DIFF WBC: CPT | Performed by: EMERGENCY MEDICINE

## 2024-06-13 PROCEDURE — 74177 CT ABD & PELVIS W/CONTRAST: CPT

## 2024-06-13 PROCEDURE — 258N000003 HC RX IP 258 OP 636: Mod: JZ | Performed by: EMERGENCY MEDICINE

## 2024-06-13 PROCEDURE — 250N000011 HC RX IP 250 OP 636: Mod: JZ | Performed by: EMERGENCY MEDICINE

## 2024-06-13 PROCEDURE — 250N000011 HC RX IP 250 OP 636: Performed by: EMERGENCY MEDICINE

## 2024-06-13 PROCEDURE — 87086 URINE CULTURE/COLONY COUNT: CPT | Performed by: EMERGENCY MEDICINE

## 2024-06-13 PROCEDURE — 81015 MICROSCOPIC EXAM OF URINE: CPT | Performed by: EMERGENCY MEDICINE

## 2024-06-13 RX ORDER — IOPAMIDOL 755 MG/ML
71 INJECTION, SOLUTION INTRAVASCULAR ONCE
Status: COMPLETED | OUTPATIENT
Start: 2024-06-13 | End: 2024-06-13

## 2024-06-13 RX ORDER — KETOROLAC TROMETHAMINE 15 MG/ML
15 INJECTION, SOLUTION INTRAMUSCULAR; INTRAVENOUS ONCE
Status: COMPLETED | OUTPATIENT
Start: 2024-06-13 | End: 2024-06-13

## 2024-06-13 RX ADMIN — IOPAMIDOL 71 ML: 755 INJECTION, SOLUTION INTRAVENOUS at 01:43

## 2024-06-13 RX ADMIN — KETOROLAC TROMETHAMINE 15 MG: 15 INJECTION, SOLUTION INTRAMUSCULAR; INTRAVENOUS at 01:31

## 2024-06-13 RX ADMIN — SODIUM CHLORIDE 60 ML: 9 INJECTION, SOLUTION INTRAVENOUS at 01:46

## 2024-06-13 RX ADMIN — SODIUM CHLORIDE 1000 ML: 9 INJECTION, SOLUTION INTRAVENOUS at 01:30

## 2024-06-13 ASSESSMENT — ACTIVITIES OF DAILY LIVING (ADL)
ADLS_ACUITY_SCORE: 37
ADLS_ACUITY_SCORE: 37

## 2024-06-13 ASSESSMENT — COLUMBIA-SUICIDE SEVERITY RATING SCALE - C-SSRS
1. IN THE PAST MONTH, HAVE YOU WISHED YOU WERE DEAD OR WISHED YOU COULD GO TO SLEEP AND NOT WAKE UP?: NO
2. HAVE YOU ACTUALLY HAD ANY THOUGHTS OF KILLING YOURSELF IN THE PAST MONTH?: NO
6. HAVE YOU EVER DONE ANYTHING, STARTED TO DO ANYTHING, OR PREPARED TO DO ANYTHING TO END YOUR LIFE?: NO

## 2024-06-13 NOTE — ED TRIAGE NOTES
Pt comes in for intermittent abdominal pain since 2 weeks. Has noticed blood in his urine and has been peeing a lot. Hx chrohns. No fevers

## 2024-06-13 NOTE — ED PROVIDER NOTES
"  Emergency Department Note      History of Present Illness     Chief Complaint  Abdominal Pain    HPI  Giovanni Carranza is a 29 year old male who presents with abdominal pain. The patient reports that over the last 2-3 weeks he has had intermittent lower abdominal pain. He rates this pain a 7/10 in severity and notes that Tylenol has not been helping much. The patient states that his pain has worsened tonight which is his reason for presenting to the ED. He also notes some blood in his urine as well as urinary frequency. Giovanni denies fevers, penile pain, and back pain. The patient has history of Crohn's and extensive history of abdominal surgery, including resection, intestinal surgery, and appendectomy.    Independent Historian  None    Review of External Notes  None  Past Medical History   Medical History and Problem List  Crohn's disease  Psoas abscess, right    Medications  The patient is not currently taking any prescribed medications.    Surgical History   Colonoscopy x2  Cystoscopy, insert catheter ureter (B)  Laparoscopic resection ileocecal  Laparotomy exploratory  Appendectomy    Physical Exam   Patient Vitals for the past 24 hrs:   BP Temp Temp src Pulse Resp SpO2 Height Weight   06/13/24 0252 119/72 -- -- 78 20 98 % -- --   06/13/24 0022 115/76 98  F (36.7  C) Temporal 66 18 98 % 1.727 m (5' 8\") 63.5 kg (140 lb)     Physical Exam  General: Alert, appears well-developed and well-nourished. Cooperative.     In mild distress  HEENT:  Head:  Atraumatic  Ears:  External ears are normal  Mouth/Throat:  Oropharynx is without erythema or exudate and mucous membranes are moist.   Eyes:   Conjunctivae normal and EOM are normal. No scleral icterus.  CV:  Normal rate, regular rhythm, normal heart sounds and radial pulses are 2+ and symmetric.  No murmur.  Resp:  Breath sounds are clear bilaterally    Non-labored, no retractions or accessory muscle use  GI:  Abdomen is soft, no distension, mild RLQ tenderness. No " rebound or guarding.  No CVA tenderness bilaterally  MS:  Normal range of motion. No edema.    Normal strength in all 4 extremities.     Back atraumatic.    No midline cervical, thoracic, or lumbar tenderness  Skin:  Warm and dry.  No rash or lesions noted.  Neuro:   Alert. Normal strength.  GCS: 15  Psych: Normal mood and affect.    Diagnostics   Lab Results   Labs Ordered and Resulted from Time of ED Arrival to Time of ED Departure   COMPREHENSIVE METABOLIC PANEL - Abnormal       Result Value    Sodium 139      Potassium 3.8      Carbon Dioxide (CO2) 25      Anion Gap 10      Urea Nitrogen 11.5      Creatinine 1.15      GFR Estimate 89      Calcium 8.5 (*)     Chloride 104      Glucose 93      Alkaline Phosphatase 54      AST 23      ALT 19      Protein Total 7.0      Albumin 3.9      Bilirubin Total 0.2     ROUTINE UA WITH MICROSCOPIC REFLEX TO CULTURE - Abnormal    Color Urine Light Yellow      Appearance Urine Clear      Glucose Urine Negative      Bilirubin Urine Negative      Ketones Urine Negative      Specific Gravity Urine 1.024      Blood Urine Moderate (*)     pH Urine 7.5 (*)     Protein Albumin Urine 20 (*)     Urobilinogen Urine Normal      Nitrite Urine Negative      Leukocyte Esterase Urine Negative      Mucus Urine Present (*)     RBC Urine 26 (*)     WBC Urine 1      Squamous Epithelials Urine <1     CBC WITH PLATELETS AND DIFFERENTIAL - Abnormal    WBC Count 6.0      RBC Count 4.08 (*)     Hemoglobin 10.0 (*)     Hematocrit 33.4 (*)     MCV 82      MCH 24.5 (*)     MCHC 29.9 (*)     RDW 14.4      Platelet Count 287      % Neutrophils 36      % Lymphocytes 48      % Monocytes 10      % Eosinophils 5      % Basophils 1      % Immature Granulocytes 0      NRBCs per 100 WBC 0      Absolute Neutrophils 2.2      Absolute Lymphocytes 2.9      Absolute Monocytes 0.6      Absolute Eosinophils 0.3      Absolute Basophils 0.0      Absolute Immature Granulocytes 0.0      Absolute NRBCs 0.0     LIPASE -  Normal    Lipase 43     URINE CULTURE       Imaging  CT Abdomen Pelvis w Contrast   Final Result   IMPRESSION:    1.  Diffusely thickened urinary bladder suspicious for cystitis. Recommend correlation with urinalysis.   2.  Prior ileocecectomy. Stable 1.6 cm soft tissue focus adjacent to the suture line possibly representing a small area of scarring. Recommend attention on follow-up.   3.  No definite evidence of acute bowel inflammation. No bowel obstruction.          Independent Interpretation  None    ED Course    Medications Administered  Medications   ketorolac (TORADOL) injection 15 mg (15 mg Intravenous $Given 6/13/24 0131)   sodium chloride 0.9% BOLUS 1,000 mL (0 mLs Intravenous Stopped 6/13/24 0218)   Saline Flush - CT (60 mLs Intravenous $Given 6/13/24 0146)   iopamidol (ISOVUE-370) solution 71 mL (71 mLs Intravenous $Given 6/13/24 0143)         Discussion of Management  None    Social Determinants of Health adding to complexity of care  None    ED Course  ED Course as of 06/13/24 0545   Thu Jun 13, 2024   0113 I obtained history and examined the patient as noted above     Medical Decision Making / Diagnosis   CMS Diagnoses: None    MIPS  None    [facility]  Giovanni Carranza is a 29 year old male  with a history of Crohn's status post ileocecal resection and reanastomosis who presents with suprapubic abdominal discomfort and hematuria.  Urinalysis shows no suspicion for infection, although with no other clear etiology for hematuria we will plan to culture urine at this point.  CT imaging showed no sinister intra-abdominal processes although there was a diffusely thickened urinary bladder concerning for cystitis.  Patient did have a prior ileocecectomy and a stable 1.6 cm soft tissue focus adjacent to the suture line likely representing area of scarring.  No evidence of acute bowel inflammation or bowel obstruction.  Given no sinister intra-abdominal findings and otherwise unremarkable examination as well as  improvement after IV fluids and Toradol, safe for continued outpatient management.  Will defer back to primary care for close follow-up given hematuria and urine culture.  Return precautions for development of worsening abdominal pain, fever or vomiting.  After all questions answered and return precautions understood, discharged home.    Disposition  The patient was discharged.     ICD-10 Codes:    ICD-10-CM    1. Hematuria, unspecified type  R31.9       2. Suprapubic abdominal pain  R10.2           Scribe Disclosure:  Karl URRUTIA, am serving as a scribe at 1:54 AM on 6/13/2024 to document services personally performed by Andrea Osei MD based on my observations and the provider's statements to me.        Andrea Osei MD  06/13/24 2941

## 2024-06-14 LAB — BACTERIA UR CULT: NO GROWTH

## 2024-06-30 ENCOUNTER — HEALTH MAINTENANCE LETTER (OUTPATIENT)
Age: 29
End: 2024-06-30

## 2025-07-13 ENCOUNTER — HEALTH MAINTENANCE LETTER (OUTPATIENT)
Age: 30
End: 2025-07-13